# Patient Record
Sex: FEMALE | Race: WHITE | NOT HISPANIC OR LATINO | Employment: OTHER | URBAN - METROPOLITAN AREA
[De-identification: names, ages, dates, MRNs, and addresses within clinical notes are randomized per-mention and may not be internally consistent; named-entity substitution may affect disease eponyms.]

---

## 2017-02-18 ASSESSMENT — ENCOUNTER SYMPTOMS
COUGH: 1
MYALGIAS: 0
HEMATURIA: 0
SMELL DISTURBANCE: 1
SPUTUM PRODUCTION: 1
DYSPNEA ON EXERTION: 0
RESPIRATORY PAIN: 0
MUSCLE CRAMPS: 0
SORE THROAT: 1
FLANK PAIN: 0
BACK PAIN: 1
HEMOPTYSIS: 0
NECK PAIN: 0
WHEEZING: 0
SNORES LOUDLY: 0
TROUBLE SWALLOWING: 0
DYSURIA: 0
STIFFNESS: 1
TASTE DISTURBANCE: 1
NECK MASS: 0
HOARSE VOICE: 1
COUGH DISTURBING SLEEP: 1
DIFFICULTY URINATING: 1
ARTHRALGIAS: 0
JOINT SWELLING: 0
SINUS CONGESTION: 1
SINUS PAIN: 1
HOT FLASHES: 0
DECREASED LIBIDO: 1
MUSCLE WEAKNESS: 0
SHORTNESS OF BREATH: 0
POSTURAL DYSPNEA: 0

## 2017-02-21 ENCOUNTER — ONCOLOGY VISIT (OUTPATIENT)
Dept: ONCOLOGY | Facility: CLINIC | Age: 62
End: 2017-02-21
Attending: NURSE PRACTITIONER
Payer: COMMERCIAL

## 2017-02-21 VITALS
OXYGEN SATURATION: 99 % | HEIGHT: 66 IN | SYSTOLIC BLOOD PRESSURE: 128 MMHG | WEIGHT: 136 LBS | RESPIRATION RATE: 17 BRPM | BODY MASS INDEX: 21.86 KG/M2 | HEART RATE: 56 BPM | TEMPERATURE: 98.3 F | DIASTOLIC BLOOD PRESSURE: 80 MMHG

## 2017-02-21 DIAGNOSIS — C54.1 ENDOMETRIAL CANCER (H): Primary | ICD-10-CM

## 2017-02-21 PROCEDURE — 99213 OFFICE O/P EST LOW 20 MIN: CPT | Mod: ZP | Performed by: NURSE PRACTITIONER

## 2017-02-21 PROCEDURE — 99212 OFFICE O/P EST SF 10 MIN: CPT | Mod: ZF

## 2017-02-21 ASSESSMENT — PAIN SCALES - GENERAL: PAINLEVEL: NO PAIN (0)

## 2017-02-21 NOTE — PROGRESS NOTES
Follow Up Notes on Referred Patient    Date: 2017    RE: Leydi Zaragoza  : 1955  BARB: 2017    Leydi Zaragoza is a 61 year old woman with a diagnosis of stage 1A grade 1 endometrioid adenocarcinoma s/p TLH, BSO on 13. She is here today for a surveillance visit. Denies vulvar or perineal irritation. She has chronic difficulty sleeping, urinary inc, vaginal dryness, and generalized arthralgias and myalgias. Denies abd pain or constipation. She has been feeling great and plays tennis frequently. She is up to date on her colonoscopy. She needs to schedule her mammo.    Cancer History:    The patient presented for evaluation of an episode of postmenopausal bleeding 13. Also c/o RUQ pain. No cramping. Pelvic ultrasound done on 13 normal uterus and ovaries, endometrial cavity containing three masses measuring 2.1 x 2.1 x 1.2 cm, 1.2 x 1.3 x 0.8 cm, 0.8 x 1.0 x 0.7 cm.    13: Dilatation and curettage, operative hysteroscopy with MyoSure morcellation of endometrial polyps with Jaylene Jacobson MD    FINAL DIAGNOSIS:  Uterus, endometrium, polyp:  - Complex atypical hyperplasia.    Uterus, endometrium, polypectomy:  - Adenocarcinoma arising in complex atypical hyperplasia. Fragmented, but markedly atypical endometrium. There is marked cytologic atypia. In some areas, branching glandular forms are closely opposed. There is mild squamoid morular metaplasia. Some features are worrisome for vicente carcinoma; however, the biopsy is quite fragmented. Separately submitted fragments labeled morcellated polyp shows similar features with more confluent areas, thus meeting criteria for adenocarcinoma .   13: Exam under anesthesia, total laparoscopic hysterectomy, bilateral salpingo-oophorectomy, pelvic washings, cystoscopy    Pathologic Staging: pT1a pNX.  Primary Tumor (pT): pT1a [1A]: Tumor invades less than one-half of the myometrium.  Regional lymph nodes (pN): pNX: Cannot be  assessed  No nodes submitted or found  Distant Metastasis (pM): Not applicable.  7/17/13: Post op visit. Pathology reviewed in detail with the patient and copy provided. In light of the grade 1 and limited depth of invasion < 30%, no addition adjuvant therapy is recommended at this time. Potential risk of recurrence reviewed and sign for possible recurrence reviewed. Plan for scheduled tumor surveillance every 3 months for the first 2 years and them every 6 months for an addition 3 years. Patient is encouraged to continue follow up for routine cancer surveillance including breast and colon cancer.  10/24/13: Pap NIL.     10/30/14: Surveillance visit. Feeling well. She denies any vaginal bleeding, no new changes in her bowel (has seen her PCP for diarrhea and is being worked up) or bladder habits (has scant amount of urine loss in the morning), no nausea/emesis, no lower extremity edema, and no difficulties eating or sleeping. She denies any abdominal discomfort/bloating, no fevers or chills, and no chest pain or shortness of breath. She states her health screenings are current, she is sexually active and using a lubricant, and reports occassional dryness for which she uses coconut oil with good results.      02/11/15: Survelliance visit. She reports feeling well. Denies any vaginal bleeding, abd pain/discomfort, or abd bloating/cramping. No new changes in her bowel (has seen her PCP for diarrhea and is being worked up), she takes benafiber and has had regular bm. No change in bladder habits, no nausea/emesis, no lower extremity edema, and no difficulties eating or sleeping. No fevers or chills, and no chest pain or shortness of breath. She states her health screenings are current except she is due for mammogram, she is sexually active and using a lubricant, and reports occassional dryness for which she uses coconut oil with good results.      4/21/15: Patient called the clinic and spoke with clinic staff:  "Assessment: Pt of Dr. Khan with HX of grade 1 stage IA endometrioid adenocarcinoma s/p TLH, BSO on 7/1/13 . Is scheduled to have her 3 month check on 5/12. However would like a sooner appointment. Pt has discovered a lump between her vagina and rectum which is irritated and when touched has a pin prick sensation. Difficult for pt to assess. Two friends who were diagnosed with cancer at the same time she was have within the last month been diagnosed with metastatic cancer, \"they are goners\" Pt is very concerned. Plan: Suggested pt see her PCP now but does not want to do that. Sooner appointment given on 4/28 at 10:20 AM     4/27/15: Follow up of lump between her vagina and rectum which is irritated when touched that has a pin prick sensation. She notes RLQ discomfort stabbing sensation off and on. 3 month follow up.  7/28/15: 3 month follow up  02/9/16: 3 month follow up   8/9/16: 3 month follow up   2/21/17: 3 month follow up      Review of Systems:     Systemic no weight changes; no fever; no chills; occasional night sweats; no appetite changes no difficulty sleeping  Skin no rashes, or lesions. Right vulvar lesion followed by derm and benign    Eye no irritation; no changes in vision  Magali-Laryngeal no dysphagia; no hoarseness    Pulmonary no cough; no shortness of breath  Cardiovascular no chest pain; no palpitations  Gastrointestinal no diarrhea; no constipation; no abdominal pain; no changes in bowel habits; no blood in stool.  Genitourinary no urinary frequency; no urinary urgency; no dysuria; no pain; no abnormal vaginal discharge; no abnormal vaginal bleeding; + vaginal dryness + urinary inc wears pad + chronic inc that remains the same    Musculoskeletal + myalgias; + arthralgias; + right low back pain  Psychiatric no depressed mood; no anxiety    Hematologic no tender lymph nodes; no noticeable swellings or lumps    Endocrine + hot flashes controlled; no heat/cold intolerance    Neurological no tremor; " occasional numbness and tingling to ue and le; no headaches; no difficulty sleeping    Past Medical History:    Past Medical History   Diagnosis Date     Acute bronchospasm      one year but resolved - exercise induced.     Uterine polyp          Past Surgical History:    Past Surgical History   Procedure Laterality Date     Hc tooth extraction w/forcep       Colonoscopy  10/11/11     Dilation and curettage, operative hysteroscopy with morcellator, combined  6/20/2013     Procedure: COMBINED DILATION AND CURETTAGE, OPERATIVE HYSTEROSCOPY WITH MORCELLATOR;  Dilation And Curettage, Operative Hysteroscopy With Myosure, Morcellation of endometrial polyps.;  Surgeon: Jaylene Jacobson MD;  Location: [facility]     Laparoscopic hysterectomy total, bilateral salpingo-oophorectomy, node dissection, combined  7/1/2013     Procedure: COMBINED LAPAROSCOPIC HYSTERECTOMY TOTAL, SALPINGO-OOPHORECTOMY, NODE DISSECTION;  Total Laparoscopic Hysterectomy, Bilateral Salpingo Oophorectomy, Cystoscopy;  Surgeon: Jaelyn Khan MD;  Location: [facility] OR         Health Maintenance Due   Topic Date Due     HEPATITIS C SCREENING  07/01/1971     INFLUENZA VACCINE (SYSTEM ASSIGNED)  09/01/2016       Current Medications:     Current Outpatient Prescriptions   Medication Sig Dispense Refill     MAGNESIUM CARBONATE PO Take 125 mg by mouth daily        B Complex Vitamins (VITAMIN-B COMPLEX PO) Take by mouth daily        glutamine 500 MG TABS Take by mouth daily        Calcium Carbonate-Vitamin D (CALCIUM 500 + D PO) Take by mouth 2 times daily       Loperamide HCl (IMODIUM A-D PO) Take by mouth as needed Reported on 2/21/2017           Allergies:        Allergies   Allergen Reactions     Amoxicillin      rash        Social History:     Social History   Substance Use Topics     Smoking status: Never Smoker     Smokeless tobacco: Never Used     Alcohol use 0.0 oz/week     0 Standard drinks or equivalent per week      Comment: Rare, 1 per week  "        Family History:     The patient's family history is notable for     Family History   Problem Relation Age of Onset     C.A.D. Mother      stents at age 78, passed away 1/30/10     DIABETES Mother      Hypertension Mother      HEART DISEASE Mother      Uses heart medication for Tachycardia, has had stent placement 2 at once     Arthritis Mother      Hypertension Father      Neurologic Disorder Daughter      astrocytoma     Connective Tissue Disorder Daughter      jrsatish, in remission, melonoma     GASTROINTESTINAL DISEASE Mother      bowel obstruction - passed away age 86     CANCER Daughter      melanoma         Physical Exam:     /80 (BP Location: Right arm, Patient Position: Chair, Cuff Size: Adult Regular)  Pulse 56  Temp 98.3  F (36.8  C) (Oral)  Resp 17  Ht 1.676 m (5' 6\")  Wt 61.7 kg (136 lb)  LMP 09/15/2007  SpO2 99%  BMI 21.95 kg/m2  Body mass index is 21.95 kg/(m^2).    General Appearance: healthy and alert, no distress     HEENT:  no thyromegaly, no palpable nodules or masses        Cardiovascular: regular rate and rhythm, no gallops, rubs or murmurs     Respiratory: lungs clear, no rales, rhonchi or wheezes    Musculoskeletal: extremities non tender and without edema    Skin: no lesions or rashes     Neurological: normal gait, no gross defects     Psychiatric: appropriate mood and affect                               Hematological: normal cervical, supraclavicular and inguinal lymph nodes     Gastrointestinal:       abdomen soft, non-tender, non-distended, no organomegaly or masses    Genitourinary: External genitalia and urethral meatus appears normal. Patient has Right vulvar nevi which remains the same and she follows with dermatology. No abnormal lesions noted. Vagina is smooth without nodularity or masses. Cervix surgically absent. Bimanual exam reveal no masses, nodularity or fullness. Recto-vaginal exam confirms these findings.     Assessment:     Leydi Zaragoza is a 61 year old woman " with a diagnosis of stage IA grade 1 endometrioid adenocarcinoma s/p TLH, BSO on 7/1/13. She is here today for a surveillance visit.     Plan:      1.) Stage 1A Grade 1 Endometrioid Adenocarcinoma. Patient to RTC in 6 months for her next surveillance visit. Reviewed her remaining surveillance every 6 months for an additional 3 years. Reviewed signs and symptoms for when she should contact the clinic or seek additional care. Patient to contact the clinic with any questions or concerns in the interim.  2.) Vaginal Dryness. Reviewed use of lubricant for daily use as needed; adequate amount of lubricant for intercourse, as well as changing positions for comfort. Try OTC replense.  3.) Labs and/or tests ordered include: None today.  4.) Health maintenance issues addressed today include annual health maintenance and non-gyn issues with PCP.  Sera Tavera CNP  2/21/2017 10:35 AM    CC  Patient Care Team:  Mary Leonardo DO as PCP - General  Mary Rousseau PsyD as Psychologist (Psychology)  SELF, REFERRED

## 2017-02-21 NOTE — LETTER
2017       RE: Leydi Zaragoza  [address]  [place] [id number]     Dear Colleague,    Thank you for referring your patient, Leydi Zaragoza, to the [facility]. Please see a copy of my visit note below.                Follow Up Notes on Referred Patient    Date: 2017    RE: Leydi Zaragoza  : 1955  BARB: 2017    Leydi Zaragoza is a 61 year old woman with a diagnosis of stage 1A grade 1 endometrioid adenocarcinoma s/p TLH, BSO on 13. She is here today for a surveillance visit. Denies vulvar or perineal irritation. She has chronic difficulty sleeping, urinary inc, vaginal dryness, and generalized arthralgias and myalgias. Denies abd pain or constipation. She has been feeling great and plays tennis frequently. She is up to date on her colonoscopy. She needs to schedule her mammo.    Cancer History:    The patient presented for evaluation of an episode of postmenopausal bleeding 13. Also c/o RUQ pain. No cramping. Pelvic ultrasound done on 13 normal uterus and ovaries, endometrial cavity containing three masses measuring 2.1 x 2.1 x 1.2 cm, 1.2 x 1.3 x 0.8 cm, 0.8 x 1.0 x 0.7 cm.    13: Dilatation and curettage, operative hysteroscopy with MyoSure morcellation of endometrial polyps with Jaylene Jacobson MD    FINAL DIAGNOSIS:  Uterus, endometrium, polyp:  - Complex atypical hyperplasia.    Uterus, endometrium, polypectomy:  - Adenocarcinoma arising in complex atypical hyperplasia. Fragmented, but markedly atypical endometrium. There is marked cytologic atypia. In some areas, branching glandular forms are closely opposed. There is mild squamoid morular metaplasia. Some features are worrisome for vicente carcinoma; however, the biopsy is quite fragmented. Separately submitted fragments labeled morcellated polyp shows similar features with more confluent areas, thus meeting criteria for adenocarcinoma .   13: Exam under anesthesia, total  laparoscopic hysterectomy, bilateral salpingo-oophorectomy, pelvic washings, cystoscopy    Pathologic Staging: pT1a pNX.  Primary Tumor (pT): pT1a [1A]: Tumor invades less than one-half of the myometrium.  Regional lymph nodes (pN): pNX: Cannot be assessed  No nodes submitted or found  Distant Metastasis (pM): Not applicable.  7/17/13: Post op visit. Pathology reviewed in detail with the patient and copy provided. In light of the grade 1 and limited depth of invasion < 30%, no addition adjuvant therapy is recommended at this time. Potential risk of recurrence reviewed and sign for possible recurrence reviewed. Plan for scheduled tumor surveillance every 3 months for the first 2 years and them every 6 months for an addition 3 years. Patient is encouraged to continue follow up for routine cancer surveillance including breast and colon cancer.  10/24/13: Pap NIL.     10/30/14: Surveillance visit. Feeling well. She denies any vaginal bleeding, no new changes in her bowel (has seen her PCP for diarrhea and is being worked up) or bladder habits (has scant amount of urine loss in the morning), no nausea/emesis, no lower extremity edema, and no difficulties eating or sleeping. She denies any abdominal discomfort/bloating, no fevers or chills, and no chest pain or shortness of breath. She states her health screenings are current, she is sexually active and using a lubricant, and reports occassional dryness for which she uses coconut oil with good results.      02/11/15: Survelliance visit. She reports feeling well. Denies any vaginal bleeding, abd pain/discomfort, or abd bloating/cramping. No new changes in her bowel (has seen her PCP for diarrhea and is being worked up), she takes benafiber and has had regular bm. No change in bladder habits, no nausea/emesis, no lower extremity edema, and no difficulties eating or sleeping. No fevers or chills, and no chest pain or shortness of breath. She states her health screenings are  "current except she is due for mammogram, she is sexually active and using a lubricant, and reports occassional dryness for which she uses coconut oil with good results.      4/21/15: Patient called the clinic and spoke with clinic staff: Assessment: Pt of Dr. Khan with HX of grade 1 stage IA endometrioid adenocarcinoma s/p TLH, BSO on 7/1/13 . Is scheduled to have her 3 month check on 5/12. However would like a sooner appointment. Pt has discovered a lump between her vagina and rectum which is irritated and when touched has a pin prick sensation. Difficult for pt to assess. Two friends who were diagnosed with cancer at the same time she was have within the last month been diagnosed with metastatic cancer, \"they are goners\" Pt is very concerned. Plan: Suggested pt see her PCP now but does not want to do that. Sooner appointment given on 4/28 at 10:20 AM     4/27/15: Follow up of lump between her vagina and rectum which is irritated when touched that has a pin prick sensation. She notes RLQ discomfort stabbing sensation off and on. 3 month follow up.  7/28/15: 3 month follow up  02/9/16: 3 month follow up   8/9/16: 3 month follow up   2/21/17: 3 month follow up      Review of Systems:     Systemic no weight changes; no fever; no chills; occasional night sweats; no appetite changes no difficulty sleeping  Skin no rashes, or lesions. Right vulvar lesion followed by derm and benign    Eye no irritation; no changes in vision  Magali-Laryngeal no dysphagia; no hoarseness    Pulmonary no cough; no shortness of breath  Cardiovascular no chest pain; no palpitations  Gastrointestinal no diarrhea; no constipation; no abdominal pain; no changes in bowel habits; no blood in stool.  Genitourinary no urinary frequency; no urinary urgency; no dysuria; no pain; no abnormal vaginal discharge; no abnormal vaginal bleeding; + vaginal dryness + urinary inc wears pad + chronic inc that remains the same    Musculoskeletal + myalgias; + " arthralgias; + right low back pain  Psychiatric no depressed mood; no anxiety    Hematologic no tender lymph nodes; no noticeable swellings or lumps    Endocrine + hot flashes controlled; no heat/cold intolerance    Neurological no tremor; occasional numbness and tingling to ue and le; no headaches; no difficulty sleeping    Past Medical History:    Past Medical History   Diagnosis Date     Acute bronchospasm      one year but resolved - exercise induced.     Uterine polyp          Past Surgical History:    Past Surgical History   Procedure Laterality Date     Hc tooth extraction w/forcep       Colonoscopy  10/11/11     Dilation and curettage, operative hysteroscopy with morcellator, combined  6/20/2013     Procedure: COMBINED DILATION AND CURETTAGE, OPERATIVE HYSTEROSCOPY WITH MORCELLATOR;  Dilation And Curettage, Operative Hysteroscopy With Myosure, Morcellation of endometrial polyps.;  Surgeon: Jaylene Jacobson MD;  Location: [facility]     Laparoscopic hysterectomy total, bilateral salpingo-oophorectomy, node dissection, combined  7/1/2013     Procedure: COMBINED LAPAROSCOPIC HYSTERECTOMY TOTAL, SALPINGO-OOPHORECTOMY, NODE DISSECTION;  Total Laparoscopic Hysterectomy, Bilateral Salpingo Oophorectomy, Cystoscopy;  Surgeon: Jaelyn Khan MD;  Location: [facility] OR         Health Maintenance Due   Topic Date Due     HEPATITIS C SCREENING  07/01/1971     INFLUENZA VACCINE (SYSTEM ASSIGNED)  09/01/2016       Current Medications:     Current Outpatient Prescriptions   Medication Sig Dispense Refill     MAGNESIUM CARBONATE PO Take 125 mg by mouth daily        B Complex Vitamins (VITAMIN-B COMPLEX PO) Take by mouth daily        glutamine 500 MG TABS Take by mouth daily        Calcium Carbonate-Vitamin D (CALCIUM 500 + D PO) Take by mouth 2 times daily       Loperamide HCl (IMODIUM A-D PO) Take by mouth as needed Reported on 2/21/2017           Allergies:        Allergies   Allergen Reactions     Amoxicillin      rash     "    Social History:     Social History   Substance Use Topics     Smoking status: Never Smoker     Smokeless tobacco: Never Used     Alcohol use 0.0 oz/week     0 Standard drinks or equivalent per week      Comment: Rare, 1 per week         Family History:     The patient's family history is notable for     Family History   Problem Relation Age of Onset     C.A.D. Mother      stents at age 78, passed away 1/30/10     DIABETES Mother      Hypertension Mother      HEART DISEASE Mother      Uses heart medication for Tachycardia, has had stent placement 2 at once     Arthritis Mother      Hypertension Father      Neurologic Disorder Daughter      astrocytoma     Connective Tissue Disorder Daughter      jra, in remission, melonoma     GASTROINTESTINAL DISEASE Mother      bowel obstruction - passed away age 86     CANCER Daughter      melanoma         Physical Exam:     /80 (BP Location: Right arm, Patient Position: Chair, Cuff Size: Adult Regular)  Pulse 56  Temp 98.3  F (36.8  C) (Oral)  Resp 17  Ht 1.676 m (5' 6\")  Wt 61.7 kg (136 lb)  LMP 09/15/2007  SpO2 99%  BMI 21.95 kg/m2  Body mass index is 21.95 kg/(m^2).    General Appearance: healthy and alert, no distress     HEENT:  no thyromegaly, no palpable nodules or masses        Cardiovascular: regular rate and rhythm, no gallops, rubs or murmurs     Respiratory: lungs clear, no rales, rhonchi or wheezes    Musculoskeletal: extremities non tender and without edema    Skin: no lesions or rashes     Neurological: normal gait, no gross defects     Psychiatric: appropriate mood and affect                               Hematological: normal cervical, supraclavicular and inguinal lymph nodes     Gastrointestinal:       abdomen soft, non-tender, non-distended, no organomegaly or masses    Genitourinary: External genitalia and urethral meatus appears normal. Patient has Right vulvar nevi which remains the same and she follows with dermatology. No abnormal lesions noted. " Vagina is smooth without nodularity or masses. Cervix surgically absent. Bimanual exam reveal no masses, nodularity or fullness. Recto-vaginal exam confirms these findings.     Assessment:     Leydi Zaragoza is a 61 year old woman with a diagnosis of stage IA grade 1 endometrioid adenocarcinoma s/p TLH, BSO on 7/1/13. She is here today for a surveillance visit.     Plan:      1.) Stage 1A Grade 1 Endometrioid Adenocarcinoma. Patient to RTC in 6 months for her next surveillance visit. Reviewed her remaining surveillance every 6 months for an additional 3 years. Reviewed signs and symptoms for when she should contact the clinic or seek additional care. Patient to contact the clinic with any questions or concerns in the interim.  2.) Vaginal Dryness. Reviewed use of lubricant for daily use as needed; adequate amount of lubricant for intercourse, as well as changing positions for comfort. Try OTC replense.  3.) Labs and/or tests ordered include: None today.  4.) Health maintenance issues addressed today include annual health maintenance and non-gyn issues with PCP.  Sera Tavera CNP  2/21/2017 10:35 AM    CC  Patient Care Team:  Mary Leonardo DO as PCP - General  Mary Rousseau PsyD as Psychologist (Psychology)

## 2017-02-21 NOTE — NURSING NOTE
"Leydi Zaragoza is a 61 year old female who presents for:  Chief Complaint   Patient presents with     Oncology Clinic Visit     return patient for 6 months visit related to Endometrial/uterine adenocarcinoma (H)        Initial Vitals:  /80 (BP Location: Right arm, Patient Position: Chair, Cuff Size: Adult Regular)  Pulse 56  Temp 98.3  F (36.8  C) (Oral)  Resp 17  Ht 1.676 m (5' 6\")  Wt 61.7 kg (136 lb)  LMP 09/15/2007  SpO2 99%  BMI 21.95 kg/m2 Estimated body mass index is 21.95 kg/(m^2) as calculated from the following:    Height as of this encounter: 1.676 m (5' 6\").    Weight as of this encounter: 61.7 kg (136 lb).. Body surface area is 1.7 meters squared. BP completed using cuff size: regular  No Pain (0) Patient's last menstrual period was 09/15/2007. Allergies and medications reviewed.     Medications: Medication refills not needed today.  Pharmacy name entered into [facility]:    [id number] [place] [facility] - [place] - [address]  [id number] - [place] - [address]    Comments: patient denied pelvic/vaginal pain/discomfort.     5 minutes for nursing intake (face to face time)   Herson Pope CMA        "

## 2017-02-21 NOTE — MR AVS SNAPSHOT
"              After Visit Summary   2/21/2017    Leydi Zaragoza    MRN: [id number]           Patient Information     Date Of Birth          1955        Visit Information        Provider Department      2/21/2017 10:00 AM Sera Tavera APRN CNP [facility]        Today's Diagnoses     Endometrial cancer (H)    -  1       Follow-ups after your visit        Follow-up notes from your care team     Return in about 6 months (around 8/21/2017).      Who to contact     If you have questions or need follow up information about today's clinic visit or your schedule please contact [facility] directly at [phone].  Normal or non-critical lab and imaging results will be communicated to you by [email or web address], letter or phone within 4 business days after the clinic has received the results. If you do not hear from us within 7 days, please contact the clinic through [email or web address] or phone. If you have a critical or abnormal lab result, we will notify you by phone as soon as possible.  Submit refill requests through MIKESTAR or call your pharmacy and they will forward the refill request to us. Please allow 3 business days for your refill to be completed.          Additional Information About Your Visit        MyChart Information     MIKESTAR gives you secure access to your electronic health record. If you see a primary care provider, you can also send messages to your care team and make appointments. If you have questions, please call your primary care clinic.  If you do not have a primary care provider, please call [phone] and they will assist you.        Care EveryWhere ID     This is your Care EveryWhere ID. This could be used by other organizations to access your [place] medical records  [id number]        Your Vitals Were     Pulse Temperature Respirations Height Last Period Pulse Oximetry    56 98.3  F (36.8  C) (Oral) 17 1.676 m (5' 6\") 09/15/2007 99%    BMI (Body Mass Index)    "                21.95 kg/m2            Blood Pressure from Last 3 Encounters:   02/21/17 128/80   12/16/16 128/70   09/22/16 125/73    Weight from Last 3 Encounters:   02/21/17 61.7 kg (136 lb)   12/16/16 59.9 kg (132 lb 1.6 oz)   09/22/16 60.4 kg (133 lb 1.6 oz)              Today, you had the following     No orders found for display       Primary Care Provider Office Phone # Fax #    Mary MARC MalissaDO [phone]       [facility] [address] [facility]  [address]  [facility] [address]        Thank you!     Thank you for choosing [facility]  for your care. Our goal is always to provide you with excellent care. Hearing back from our patients is one way we can continue to improve our services. Please take a few minutes to complete the written survey that you may receive in the mail after your visit with us. Thank you!             Your Updated Medication List - Protect others around you: Learn how to safely use, store and throw away your medicines at www.disposemymeds.org.          This list is accurate as of: 2/21/17 10:36 AM.  Always use your most recent med list.                   Brand Name Dispense Instructions for use    CALCIUM 500 + D PO      Take by mouth 2 times daily       glutamine 500 MG Tabs      Take by mouth daily       IMODIUM A-D PO      Take by mouth as needed Reported on 2/21/2017       MAGNESIUM CARBONATE PO      Take 125 mg by mouth daily       VITAMIN-B COMPLEX PO      Take by mouth daily

## 2017-04-12 ENCOUNTER — OFFICE VISIT (OUTPATIENT)
Dept: FAMILY MEDICINE | Facility: CLINIC | Age: 62
End: 2017-04-12
Payer: COMMERCIAL

## 2017-04-12 VITALS
HEART RATE: 88 BPM | BODY MASS INDEX: 21.89 KG/M2 | TEMPERATURE: 98.5 F | SYSTOLIC BLOOD PRESSURE: 122 MMHG | WEIGHT: 136.2 LBS | HEIGHT: 66 IN | OXYGEN SATURATION: 96 % | DIASTOLIC BLOOD PRESSURE: 74 MMHG

## 2017-04-12 DIAGNOSIS — Z00.00 ENCOUNTER FOR ROUTINE ADULT HEALTH EXAMINATION WITHOUT ABNORMAL FINDINGS: Primary | ICD-10-CM

## 2017-04-12 DIAGNOSIS — C54.1 ENDOMETRIAL/UTERINE ADENOCARCINOMA (H): ICD-10-CM

## 2017-04-12 DIAGNOSIS — D23.9 DYSPLASTIC NEVUS: ICD-10-CM

## 2017-04-12 PROCEDURE — 80061 LIPID PANEL: CPT | Performed by: FAMILY MEDICINE

## 2017-04-12 PROCEDURE — 86803 HEPATITIS C AB TEST: CPT | Performed by: FAMILY MEDICINE

## 2017-04-12 PROCEDURE — 99396 PREV VISIT EST AGE 40-64: CPT | Performed by: FAMILY MEDICINE

## 2017-04-12 PROCEDURE — 82607 VITAMIN B-12: CPT | Performed by: FAMILY MEDICINE

## 2017-04-12 PROCEDURE — 36415 COLL VENOUS BLD VENIPUNCTURE: CPT | Performed by: FAMILY MEDICINE

## 2017-04-12 NOTE — PROGRESS NOTES
"   SUBJECTIVE:     CC: Leydi Zaragoza is an 61 year old woman who presents for preventive health visit.     Healthy Habits:    Do you get at least three servings of calcium containing foods daily (dairy, green leafy vegetables, etc.)? {YES/NO, DAIRY INTAKE:790922::\"yes\"}    Amount of exercise or daily activities, outside of work: {AMOUNT EXERCISE:049675}    Problems taking medications regularly {Yes /No default:678117::\"No\"}    Medication side effects: {Yes /No default.:232516::\"No\"}    Have you had an eye exam in the past two years? {YESNOBLANK:084631}    Do you see a dentist twice per year? {YESNOBLANK:692303}    Do you have sleep apnea, excessive snoring or daytime drowsiness?{YESNOBLANK:327109}    {Outside tests to abstract? :785232}    {additional problems to add:829134}    Today's PHQ-2 Score:   PHQ-2 ( 1999 Pfizer) 4/12/2017 8/9/2016   Q1: Little interest or pleasure in doing things - 0   Q2: Feeling down, depressed or hopeless - 0   PHQ-2 Score - 0   Little interest or pleasure in doing things Not at all -   Feeling down, depressed or hopeless Not at all -   PHQ-2 Score 0 -     {PHQ-2 LOOK IN ASSESSMENTS :299327}  Abuse: Current or Past(Physical, Sexual or Emotional)- {YES/NO/NA:124811}  Do you feel safe in your environment - {YES/NO/NA:852965}    Social History   Substance Use Topics     Smoking status: Never Smoker     Smokeless tobacco: Never Used     Alcohol use 0.0 oz/week     0 Standard drinks or equivalent per week      Comment: Rare, 1 per week     {ETOH AUDIT:756081}    Recent Labs   Lab Test  10/29/14   1108  09/12/12   0824   CHOL  191  180   HDL  90  66   LDL  92  100   TRIG  43  71   CHOLHDLRATIO  2.1  2.8       Reviewed orders with patient.  Reviewed health maintenance and updated orders accordingly - {Yes/No:540300::\"Yes\"}    Mammo Decision Support:  {Mammo:832058}    Pertinent mammograms are reviewed under the imaging tab.  History of abnormal Pap smear: {PAP HX:126071}    Reviewed and " "updated as needed this visit by clinical staff  Tobacco  Allergies  Meds  Med Hx  Surg Hx  Fam Hx  Soc Hx        Reviewed and updated as needed this visit by Provider        {HISTORY OPTIONS:994468}    ROS:  {FEMALE PREVENTATIVE ROS:904101}    {CHRONICPROBDATA:305260}  OBJECTIVE:     LMP 09/15/2007  EXAM:  {Exam Choices:117585}    ASSESSMENT/PLAN:     {Diag Picklist:567608}    COUNSELING:   {FEMALE COUNSELING MESSAGES:189858::\"Reviewed preventive health counseling, as reflected in patient instructions\"}    {Blood Pressure Screenin}     reports that she has never smoked. She has never used smokeless tobacco.  {Tobacco Cessation needed for ACO -- Delete if patient is a non-smoker:861776}  Estimated body mass index is 21.95 kg/(m^2) as calculated from the following:    Height as of 17: 5' 6\" (1.676 m).    Weight as of 17: 136 lb (61.7 kg).   {Weight Management Plan needed for ACO:512840}    Counseling Resources:  ATP IV Guidelines  Pooled Cohorts Equation Calculator  Breast Cancer Risk Calculator  FRAX Risk Assessment  ICSI Preventive Guidelines  Dietary Guidelines for Americans, 2010  USDA's MyPlate  ASA Prophylaxis  Lung CA Screening    Mary Leonardo, DO  [facility]  "

## 2017-04-12 NOTE — PROGRESS NOTES
SUBJECTIVE:     CC: Leydi Zaragoza is an 61 year old woman who presents for preventive health visit.     Physical   Annual:     Getting at least 3 servings of Calcium per day::  Yes    Bi-annual eye exam::  Yes    Dental care twice a year::  NO    Sleep apnea or symptoms of sleep apnea::  None    Frequency of exercise::  2-3 days/week    Duration of exercise::  Greater than 60 minutes    Taking medications regularly::  Not Applicable    Additional concerns today::  No        -------------------------------------    Today's PHQ-2 Score:   PHQ-2 ( 1999 Pfizer) 4/12/2017   Little interest or pleasure in doing things Not at all   Feeling down, depressed or hopeless Not at all   PHQ-2 Score 0       Abuse: Current or Past(Physical, Sexual or Emotional)- NO  Do you feel safe in your environment - Yes    Social History   Substance Use Topics     Smoking status: Never Smoker     Smokeless tobacco: Never Used     Alcohol use 0.0 oz/week     0 Standard drinks or equivalent per week      Comment: Rare, 1 per week     The patient does not drink >3 drinks per day nor >7 drinks per week.    Recent Labs   Lab Test  10/29/14   1108  09/12/12   0824   CHOL  191  180   HDL  90  66   LDL  92  100   TRIG  43  71   CHOLHDLRATIO  2.1  2.8       Reviewed orders with patient.  Reviewed health maintenance and updated orders accordingly - Yes    Mammo Decision Support:  Patient over age 50, mutual decision to screen reflected in health maintenance.    Pertinent mammograms are reviewed under the imaging tab.  History of abnormal Pap smear: Status post benign hysterectomy. Health Maintenance and Surgical History updated.    Reviewed and updated as needed this visit by clinical staff  Tobacco  Allergies  Meds  Med Hx  Surg Hx  Fam Hx  Soc Hx        Reviewed and updated as needed this visit by Provider            ROS:  C: NEGATIVE for fever, chills, change in weight  I: NEGATIVE for worrisome rashes, moles or lesions  E: NEGATIVE for  vision changes or irritation  ENT: NEGATIVE for ear, mouth and throat problems  R: NEGATIVE for significant cough or SOB  B: NEGATIVE for masses, tenderness or discharge  CV: NEGATIVE for chest pain, palpitations or peripheral edema  GI: NEGATIVE for nausea, abdominal pain, heartburn, or change in bowel habits  : NEGATIVE for unusual urinary or vaginal symptoms. No vaginal bleeding.  M: NEGATIVE for significant arthralgias or myalgia  N: NEGATIVE for weakness, dizziness or paresthesias  P: NEGATIVE for changes in mood or affect     Problem list, Medication list, Allergies, and Medical/Social/Surgical histories reviewed in [facility] and updated as appropriate.  OBJECTIVE:     [facility] 09/15/2007  EXAM:  GENERAL APPEARANCE: healthy, alert and no distress  EYES: Eyes grossly normal to inspection, PERRL and conjunctivae and sclerae normal  HENT: ear canals and TM's normal, nose and mouth without ulcers or lesions, oropharynx clear and oral mucous membranes moist  NECK: no adenopathy, no asymmetry, masses, or scars and thyroid normal to palpation  RESP: lungs clear to auscultation - no rales, rhonchi or wheezes  BREAST: normal without masses, tenderness or nipple discharge and no palpable axillary masses or adenopathy  CV: regular rate and rhythm, normal S1 S2, no S3 or S4, no murmur, click or rub, no peripheral edema and peripheral pulses strong  ABDOMEN: soft, nontender, no hepatosplenomegaly, no masses and bowel sounds normal  MS: no musculoskeletal defects are noted and gait is age appropriate without ataxia  SKIN: no suspicious lesions or rashes  NEURO: Normal strength and tone, sensory exam grossly normal, mentation intact and speech normal  PSYCH: mentation appears normal and affect normal/bright    ASSESSMENT/PLAN:     1. Encounter for routine adult health examination without abnormal findings     - Lipid Profile with reflex to direct LDL  - Hepatitis C Screen Reflex to HCV RNA Quant and Genotype    2.  "Endometrial/uterine adenocarcinoma (H)     - Vitamin B12    3. Dysplastic nevus         COUNSELING:  Reviewed preventive health counseling, as reflected in patient instructions         reports that she has never smoked. She has never used smokeless tobacco.    Estimated body mass index is 21.95 kg/(m^2) as calculated from the following:    Height as of 2/21/17: 5' 6\" (1.676 m).    Weight as of 2/21/17: 136 lb (61.7 kg).       Counseling Resources:  ATP IV Guidelines  Pooled Cohorts Equation Calculator  Breast Cancer Risk Calculator  FRAX Risk Assessment  ICSI Preventive Guidelines  Dietary Guidelines for Americans, 2010  [employer]'s MyPlate  ASA Prophylaxis  Lung CA Screening    Mary Leonardo,   [facility]  Answers for HPI/ROS submitted by the patient on 4/12/2017   Q1: Little interest or pleasure in doing things: 0=Not at all  Q2: Feeling down, depressed or hopeless: 0=Not at all  PHQ-2 Score: 0    "

## 2017-04-12 NOTE — MR AVS SNAPSHOT
After Visit Summary   4/12/2017    Leydi Zaragoza    MRN: [id number]           Patient Information     Date Of Birth          1955        Visit Information        Provider Department      4/12/2017 2:30 PM Mary Leonardo DO [facility]        Today's Diagnoses     Encounter for routine adult health examination without abnormal findings    -  1    Endometrial/uterine adenocarcinoma (H)        Dysplastic nevus          Care Instructions      Preventive Health Recommendations  Female Ages 50 - 64    Yearly exam: See your health care provider every year in order to  o Review health changes.   o Discuss preventive care.    o Review your medicines if your doctor has prescribed any.      Get a Pap test every three years (unless you have an abnormal result and your provider advises testing more often).    If you get Pap tests with HPV test, you only need to test every 5 years, unless you have an abnormal result.     You do not need a Pap test if your uterus was removed (hysterectomy) and you have not had cancer.    You should be tested each year for STDs (sexually transmitted diseases) if you're at risk.     Have a mammogram every 1 to 2 years.    Have a colonoscopy at age 50, or have a yearly FIT test (stool test). These exams screen for colon cancer.      Have a cholesterol test every 5 years, or more often if advised.    Have a diabetes test (fasting glucose) every three years. If you are at risk for diabetes, you should have this test more often.     If you are at risk for osteoporosis (brittle bone disease), think about having a bone density scan (DEXA).    Shots: Get a flu shot each year. Get a tetanus shot every 10 years.    Nutrition:     Eat at least 5 servings of fruits and vegetables each day.    Eat whole-grain bread, whole-wheat pasta and brown rice instead of white grains and rice.    Talk to your provider about Calcium and Vitamin D.     Lifestyle    Exercise at least 150  minutes a week (30 minutes a day, 5 days a week). This will help you control your weight and prevent disease.    Limit alcohol to one drink per day.    No smoking.     Wear sunscreen to prevent skin cancer.     See your dentist every six months for an exam and cleaning.    See your eye doctor every 1 to 2 years.          Follow-ups after your visit        Your next 10 appointments already scheduled     Apr 17, 2017  8:00 AM CDT   MA SCREENING DIGITAL BILATERAL with URBCMA1   [facility] ([facility])    [address] [place] [address], [address]  [facility] [address] [phone]           Do not use any powder, lotion or deodorant under your arms or on your breast. If you do, we will ask you to remove it before your exam.  Wear comfortable, two-piece clothing.  If you have any allergies, tell your care team.  Bring any previous mammograms from other facilities or have them mailed to the breast center.            Aug 22, 2017  9:40 AM CDT   (Arrive by 9:25 AM)   Return Visit with CLOTILDE Ko [facility] ([facility])    [address]  [address]  [facility] [address] [phone]              Who to contact     If you have questions or need follow up information about today's clinic visit or your schedule please contact [facility] directly at [phone].  Normal or non-critical lab and imaging results will be communicated to you by MyChart, letter or phone within 4 business days after the clinic has received the results. If you do not hear from us within 7 days, please contact the clinic through MyChart or phone. If you have a critical or abnormal lab result, we will notify you by phone as soon as possible.  Submit refill requests through InsideView or call your pharmacy and they will forward the refill request to us. Please allow 3 business days for your refill to be completed.          Additional Information About  "Your Visit        MyChart Information     Katango gives you secure access to your electronic health record. If you see a primary care provider, you can also send messages to your care team and make appointments. If you have questions, please call your primary care clinic.  If you do not have a primary care provider, please call [phone] and they will assist you.        Care EveryWhere ID     This is your Care EveryWhere ID. This could be used by other organizations to access your [place] medical records  [id number]        Your Vitals Were     Pulse Temperature Height Last Period Pulse Oximetry BMI (Body Mass Index)    88 98.5  F (36.9  C) (Oral) 5' 6\" (1.676 m) 09/15/2007 96% 21.98 kg/m2       Blood Pressure from Last 3 Encounters:   04/12/17 122/74   02/21/17 128/80   12/16/16 128/70    Weight from Last 3 Encounters:   04/12/17 136 lb 3.2 oz (61.8 kg)   02/21/17 136 lb (61.7 kg)   12/16/16 132 lb 1.6 oz (59.9 kg)              We Performed the Following     Hepatitis C Screen Reflex to HCV RNA Quant and Genotype     Lipid Profile with reflex to direct LDL     Vitamin B12        Primary Care Provider Office Phone # Fax #    Mary TARI Leonardo [phone]       [facility] [address] [place], [place] [id number]        Thank you!     Thank you for choosing [facility]  for your care. Our goal is always to provide you with excellent care. Hearing back from our patients is one way we can continue to improve our services. Please take a few minutes to complete the written survey that you may receive in the mail after your visit with us. Thank you!             Your Updated Medication List - Protect others around you: Learn how to safely use, store and throw away your medicines at www.disposemymeds.org.          This list is accurate as of: 4/12/17  4:06 PM.  Always use your most recent med list.                   Brand Name Dispense Instructions for use    CALCIUM 500 + D PO "      Take by mouth 2 times daily       glutamine 500 MG Tabs      Take by mouth daily       IMODIUM A-D PO      Take by mouth as needed Reported on 2/21/2017       MAGNESIUM CARBONATE PO      Take 125 mg by mouth daily       VITAMIN-B COMPLEX PO      Take by mouth daily

## 2017-04-12 NOTE — NURSING NOTE
"Chief Complaint   Patient presents with     Physical     /74  Pulse 88  Temp 98.5  F (36.9  C) (Oral)  Ht 5' 6\" (1.676 m)  Wt 136 lb 3.2 oz (61.8 kg)  LMP 09/15/2007  SpO2 96%  BMI 21.98 kg/m2 Estimated body mass index is 21.98 kg/(m^2) as calculated from the following:    Height as of this encounter: 5' 6\" (1.676 m).    Weight as of this encounter: 136 lb 3.2 oz (61.8 kg).  BP completed using cuff size: regular       Health Maintenance due pending provider review:  NONE    n/a    Munira Dean CMA  "

## 2017-04-13 LAB
CHOLEST SERPL-MCNC: 179 MG/DL
HCV AB SERPL QL IA: NORMAL
HDLC SERPL-MCNC: 81 MG/DL
LDLC SERPL CALC-MCNC: 85 MG/DL
NONHDLC SERPL-MCNC: 98 MG/DL
TRIGL SERPL-MCNC: 64 MG/DL
VIT B12 SERPL-MCNC: 568 PG/ML (ref 193–986)

## 2017-04-14 NOTE — PROGRESS NOTES
Dear Leydi,   Your test results are all back -   -All of your labs are normal.  Let us know if you have any questions.  -Mary Leonardo, DO

## 2017-04-17 ENCOUNTER — RADIANT APPOINTMENT (OUTPATIENT)
Dept: MAMMOGRAPHY | Facility: CLINIC | Age: 62
End: 2017-04-17
Attending: FAMILY MEDICINE
Payer: COMMERCIAL

## 2017-04-17 DIAGNOSIS — Z12.31 VISIT FOR SCREENING MAMMOGRAM: ICD-10-CM

## 2017-04-17 PROCEDURE — G0202 SCR MAMMO BI INCL CAD: HCPCS

## 2017-09-12 ENCOUNTER — ONCOLOGY VISIT (OUTPATIENT)
Dept: ONCOLOGY | Facility: CLINIC | Age: 62
End: 2017-09-12
Attending: NURSE PRACTITIONER
Payer: COMMERCIAL

## 2017-09-12 VITALS
HEART RATE: 61 BPM | SYSTOLIC BLOOD PRESSURE: 130 MMHG | RESPIRATION RATE: 14 BRPM | DIASTOLIC BLOOD PRESSURE: 77 MMHG | TEMPERATURE: 97.9 F | WEIGHT: 134 LBS | OXYGEN SATURATION: 99 % | BODY MASS INDEX: 21.63 KG/M2

## 2017-09-12 DIAGNOSIS — C54.1 ENDOMETRIAL/UTERINE ADENOCARCINOMA (H): Primary | ICD-10-CM

## 2017-09-12 PROCEDURE — 99213 OFFICE O/P EST LOW 20 MIN: CPT | Mod: ZP | Performed by: NURSE PRACTITIONER

## 2017-09-12 PROCEDURE — 99212 OFFICE O/P EST SF 10 MIN: CPT | Mod: ZF

## 2017-09-12 ASSESSMENT — PAIN SCALES - GENERAL: PAINLEVEL: NO PAIN (0)

## 2017-09-12 NOTE — NURSING NOTE
"Oncology Rooming Note    September 12, 2017 9:44 AM   Leydi Zaragoza is a 62 year old female who presents for:    No chief complaint on file.    Initial Vitals: /77  Pulse 61  Temp 97.9  F (36.6  C) (Oral)  Resp 14  Wt 60.8 kg (134 lb)  LMP 09/15/2007  SpO2 99%  BMI 21.63 kg/m2 Estimated body mass index is 21.63 kg/(m^2) as calculated from the following:    Height as of 4/12/17: 1.676 m (5' 6\").    Weight as of this encounter: 60.8 kg (134 lb). Body surface area is 1.68 meters squared.  No Pain (0) Comment: Data Unavailable   Patient's last menstrual period was 09/15/2007.  Allergies reviewed: Yes  Medications reviewed: Yes    Medications: Medication refills not needed today.  Pharmacy name entered into [employer]:    [id number] - [place] - [address]  [id number] - [place] - [address]    Clinical concerns:      6 minutes for nursing intake (face to face time)     Nadeen Frias CMA                "

## 2017-09-12 NOTE — LETTER
2017       RE: Leydi Zaragoza  [address]  [facility] [id number]     Dear Colleague,    Thank you for referring your patient, Leydi Zaragoza, to the [facility]. Please see a copy of my visit note below.                Follow Up Notes on Referred Patient    Date: 2017     RE: Leydi Zaragoza  : 1955  BARB: 2017    Leydi Zaragoza is a 62 year old woman with a diagnosis of stage 1A grade 1 endometrioid adenocarcinoma s/p TLH, BSO on 13. She is here today for a surveillance visit. Denies vulvar or perineal irritation. She has chronic difficulty sleeping, urinary inc, vaginal dryness, and generalized arthralgias and myalgias. Denies abd pain or constipation. Has occasional diarrhea from microscopic colitis. She is up to date on her colonoscopy and mammo.    Cancer History:    The patient presented for evaluation of an episode of postmenopausal bleeding 13. Also c/o RUQ pain. No cramping. Pelvic ultrasound done on 13 normal uterus and ovaries, endometrial cavity containing three masses measuring 2.1 x 2.1 x 1.2 cm, 1.2 x 1.3 x 0.8 cm, 0.8 x 1.0 x 0.7 cm.    13: Dilatation and curettage, operative hysteroscopy with MyoSure morcellation of endometrial polyps with Jaylene Jacobson MD    FINAL DIAGNOSIS:  Uterus, endometrium, polyp:  - Complex atypical hyperplasia.    Uterus, endometrium, polypectomy:  - Adenocarcinoma arising in complex atypical hyperplasia. Fragmented, but markedly atypical endometrium. There is marked cytologic atypia. In some areas, branching glandular forms are closely opposed. There is mild squamoid morular metaplasia. Some features are worrisome for vicente carcinoma; however, the biopsy is quite fragmented. Separately submitted fragments labeled morcellated polyp shows similar features with more confluent areas, thus meeting criteria for adenocarcinoma .   13: Exam under anesthesia, total laparoscopic hysterectomy,  bilateral salpingo-oophorectomy, pelvic washings, cystoscopy    Pathologic Staging: pT1a pNX.  Primary Tumor (pT): pT1a [1A]: Tumor invades less than one-half of the myometrium.  Regional lymph nodes (pN): pNX: Cannot be assessed  No nodes submitted or found  Distant Metastasis (pM): Not applicable.  7/17/13: Post op visit. Pathology reviewed in detail with the patient and copy provided. In light of the grade 1 and limited depth of invasion < 30%, no addition adjuvant therapy is recommended at this time. Potential risk of recurrence reviewed and sign for possible recurrence reviewed. Plan for scheduled tumor surveillance every 3 months for the first 2 years and them every 6 months for an addition 3 years. Patient is encouraged to continue follow up for routine cancer surveillance including breast and colon cancer.  10/24/13: Pap NIL.      10/30/14: Surveillance visit. Feeling well. She denies any vaginal bleeding, no new changes in her bowel (has seen her PCP for diarrhea and is being worked up) or bladder habits (has scant amount of urine loss in the morning), no nausea/emesis, no lower extremity edema, and no difficulties eating or sleeping. She denies any abdominal discomfort/bloating, no fevers or chills, and no chest pain or shortness of breath. She states her health screenings are current, she is sexually active and using a lubricant, and reports occassional dryness for which she uses coconut oil with good results.       02/11/15: Survelliance visit. She reports feeling well. Denies any vaginal bleeding, abd pain/discomfort, or abd bloating/cramping. No new changes in her bowel (has seen her PCP for diarrhea and is being worked up), she takes benafiber and has had regular bm. No change in bladder habits, no nausea/emesis, no lower extremity edema, and no difficulties eating or sleeping. No fevers or chills, and no chest pain or shortness of breath. She states her health screenings are current except she is due  "for mammogram, she is sexually active and using a lubricant, and reports occassional dryness for which she uses coconut oil with good results.       4/21/15: Patient called the clinic and spoke with clinic staff: Assessment: Pt of Dr. Khan with HX of grade 1 stage IA endometrioid adenocarcinoma s/p TLH, BSO on 7/1/13 . Is scheduled to have her 3 month check on 5/12. However would like a sooner appointment. Pt has discovered a lump between her vagina and rectum which is irritated and when touched has a pin prick sensation. Difficult for pt to assess. Two friends who were diagnosed with cancer at the same time she was have within the last month been diagnosed with metastatic cancer, \"they are goners\" Pt is very concerned. Plan: Suggested pt see her PCP now but does not want to do that. Sooner appointment given on 4/28 at 10:20 AM      4/27/15: Follow up of lump between her vagina and rectum which is irritated when touched that has a pin prick sensation. She notes RLQ discomfort stabbing sensation off and on. 3 month follow up.  7/28/15: 3 month follow up  02/9/16: 6 month follow up   8/9/16: 6 month follow up   2/21/17: 6 month follow up   9/12/17: 6 month follow up     Review of Systems:      Systemic no weight changes; no fever; no chills; occasional night sweats; no appetite changes + difficulty sleeping  Skin no rashes, or lesions. Right vulvar lesion followed by derm and benign    Eye no irritation; no changes in vision  Magali-Laryngeal no dysphagia; no hoarseness    Pulmonary no cough; no shortness of breath  Cardiovascular no chest pain; no palpitations  Gastrointestinal no diarrhea; no constipation; no abdominal pain; no changes in bowel habits; no blood in stool.  Genitourinary no urinary frequency; no urinary urgency; no dysuria; no pain; no abnormal vaginal discharge; no abnormal vaginal bleeding; + vaginal dryness + decrease libido + chronic inc that remains the same    Musculoskeletal + myalgias; + " arthralgias; + tingling remains the same  Psychiatric no depressed mood; no anxiety    Hematologic no tender lymph nodes; no noticeable swellings or lumps    Endocrine + hot flashes controlled; no heat/cold intolerance    Neurological no tremor; occasional numbness and tingling to ue and le; no headaches; + difficulty sleeping    Past Medical History:    Past Medical History:   Diagnosis Date     Acute bronchospasm     one year but resolved - exercise induced.     Uterine polyp          Past Surgical History:    Past Surgical History:   Procedure Laterality Date     COLONOSCOPY  10/11/11     DILATION AND CURETTAGE, OPERATIVE HYSTEROSCOPY WITH MORCELLATOR, COMBINED  6/20/2013    Procedure: COMBINED DILATION AND CURETTAGE, OPERATIVE HYSTEROSCOPY WITH MORCELLATOR;  Dilation And Curettage, Operative Hysteroscopy With Myosure, Morcellation of endometrial polyps.;  Surgeon: Jaylene Jacobson MD;  Location: [facility] OR      TOOTH EXTRACTION W/FORCEP       LAPAROSCOPIC HYSTERECTOMY TOTAL, BILATERAL SALPINGO-OOPHORECTOMY, NODE DISSECTION, COMBINED  7/1/2013    Procedure: COMBINED LAPAROSCOPIC HYSTERECTOMY TOTAL, SALPINGO-OOPHORECTOMY, NODE DISSECTION;  Total Laparoscopic Hysterectomy, Bilateral Salpingo Oophorectomy, Cystoscopy;  Surgeon: Jaelyn Khan MD;  Location: [facility] OR         Health Maintenance Due   Topic Date Due     INFLUENZA VACCINE (SYSTEM ASSIGNED)  09/01/2017     ADVANCE DIRECTIVE PLANNING Q5 YRS  09/12/2017       Current Medications:     Current Outpatient Prescriptions   Medication Sig Dispense Refill     MAGNESIUM CARBONATE PO Take 125 mg by mouth daily        B Complex Vitamins (VITAMIN-B COMPLEX PO) Take by mouth daily        glutamine 500 MG TABS Take by mouth daily        Loperamide HCl (IMODIUM A-D PO) Take by mouth as needed Reported on 2/21/2017       Calcium Carbonate-Vitamin D (CALCIUM 500 + D PO) Take by mouth 2 times daily           Allergies:        Allergies   Allergen Reactions      Amoxicillin      rash        Social History:     Social History   Substance Use Topics     Smoking status: Never Smoker     Smokeless tobacco: Never Used     Alcohol use 0.0 oz/week     0 Standard drinks or equivalent per week      Comment: Rare, 1 per week       History   Drug Use No         Family History:     The patient's family history is notable for     Family History   Problem Relation Age of Onset     C.A.D. Mother      stents at age 78, passed away 1/30/10     DIABETES Mother      Hypertension Mother      HEART DISEASE Mother      Uses heart medication for Tachycardia, has had stent placement 2 at once     Arthritis Mother      GASTROINTESTINAL DISEASE Mother      bowel obstruction - passed away age 86     Hypertension Father      Neurologic Disorder Daughter      astrocytoma     Connective Tissue Disorder Daughter      jra, in remission, melonoma     CANCER Daughter      melanoma     Other - See Comments Daughter      atypical ductal hyperplasia         Physical Exam:     /77  Pulse 61  Temp 97.9  F (36.6  C) (Oral)  Resp 14  Wt 60.8 kg (134 lb)  LMP 09/15/2007  SpO2 99%  BMI 21.63 kg/m2  Body mass index is 21.63 kg/(m^2).    General Appearance: healthy and alert, no distress     HEENT:  no palpable nodules or masses        Cardiovascular: regular rate and rhythm, no gallops, rubs or murmurs     Respiratory: lungs clear, no rales, rhonchi or wheezes    Musculoskeletal: extremities non tender and without edema    Skin: no lesions or rashes     Neurological: normal gait, no gross defects     Psychiatric: appropriate mood and affect                               Hematological: normal cervical, supraclavicular and inguinal lymph nodes     Gastrointestinal:       abdomen soft, non-tender, non-distended, no organomegaly or masses    Genitourinary: External genitalia and urethral meatus appears normal. Patient has Right vulvar nevi which remains the same and she follows with dermatology. No abnormal  lesions noted. Vagina is smooth without nodularity or masses. Cervix surgically absent. Bimanual exam reveal no masses, nodularity or fullness. Recto-vaginal exam confirms these findings.      Assessment:      Leydi Zaragoza is a 61 year old woman with a diagnosis of stage IA grade 1 endometrioid adenocarcinoma s/p TLH, BSO on 7/1/13. She is here today for a surveillance visit.      Plan:       1.) Stage 1A Grade 1 Endometrioid Adenocarcinoma. Patient to RTC in 6 months for her next surveillance visit. Reviewed her remaining surveillance every 6 months for an additional 3 years until 7/2018. Reviewed signs and symptoms for when she should contact the clinic or seek additional care. Patient to contact the clinic with any questions or concerns in the interim.  2.) Vaginal Dryness. Reviewed use of lubricant for daily use as needed; adequate amount of lubricant for intercourse, as well as changing positions for comfort. Try OTC replense.  3.) Labs and/or tests ordered include: None today.  4.) Health maintenance issues addressed today include annual health maintenance and non-gyn issues with PCP.  Sera Tavera CNP  9/12/2017 10:20 AM      CC  Patient Care Team:  Mary Leonardo DO as PCP - General  Mary Rousseau PsyD as Psychologist (Psychology)

## 2017-09-12 NOTE — PROGRESS NOTES
Follow Up Notes on Referred Patient    Date: 2017     RE: Leydi Zaragoza  : 1955  BARB: 2017    Leydi Zaragoza is a 62 year old woman with a diagnosis of stage 1A grade 1 endometrioid adenocarcinoma s/p TLH, BSO on 13. She is here today for a surveillance visit. Denies vulvar or perineal irritation. She has chronic difficulty sleeping, urinary inc, vaginal dryness, and generalized arthralgias and myalgias. Denies abd pain or constipation. Has occasional diarrhea from microscopic colitis. She is up to date on her colonoscopy and mammo.    Cancer History:    The patient presented for evaluation of an episode of postmenopausal bleeding 13. Also c/o RUQ pain. No cramping. Pelvic ultrasound done on 13 normal uterus and ovaries, endometrial cavity containing three masses measuring 2.1 x 2.1 x 1.2 cm, 1.2 x 1.3 x 0.8 cm, 0.8 x 1.0 x 0.7 cm.    13: Dilatation and curettage, operative hysteroscopy with MyoSure morcellation of endometrial polyps with Jaylene Jacobson MD    FINAL DIAGNOSIS:  Uterus, endometrium, polyp:  - Complex atypical hyperplasia.    Uterus, endometrium, polypectomy:  - Adenocarcinoma arising in complex atypical hyperplasia. Fragmented, but markedly atypical endometrium. There is marked cytologic atypia. In some areas, branching glandular forms are closely opposed. There is mild squamoid morular metaplasia. Some features are worrisome for vicente carcinoma; however, the biopsy is quite fragmented. Separately submitted fragments labeled morcellated polyp shows similar features with more confluent areas, thus meeting criteria for adenocarcinoma .   13: Exam under anesthesia, total laparoscopic hysterectomy, bilateral salpingo-oophorectomy, pelvic washings, cystoscopy    Pathologic Staging: pT1a pNX.  Primary Tumor (pT): pT1a [1A]: Tumor invades less than one-half of the myometrium.  Regional lymph nodes (pN): pNX: Cannot be assessed  No nodes submitted  or found  Distant Metastasis (pM): Not applicable.  7/17/13: Post op visit. Pathology reviewed in detail with the patient and copy provided. In light of the grade 1 and limited depth of invasion < 30%, no addition adjuvant therapy is recommended at this time. Potential risk of recurrence reviewed and sign for possible recurrence reviewed. Plan for scheduled tumor surveillance every 3 months for the first 2 years and them every 6 months for an addition 3 years. Patient is encouraged to continue follow up for routine cancer surveillance including breast and colon cancer.  10/24/13: Pap NIL.      10/30/14: Surveillance visit. Feeling well. She denies any vaginal bleeding, no new changes in her bowel (has seen her PCP for diarrhea and is being worked up) or bladder habits (has scant amount of urine loss in the morning), no nausea/emesis, no lower extremity edema, and no difficulties eating or sleeping. She denies any abdominal discomfort/bloating, no fevers or chills, and no chest pain or shortness of breath. She states her health screenings are current, she is sexually active and using a lubricant, and reports occassional dryness for which she uses coconut oil with good results.       02/11/15: Survelliance visit. She reports feeling well. Denies any vaginal bleeding, abd pain/discomfort, or abd bloating/cramping. No new changes in her bowel (has seen her PCP for diarrhea and is being worked up), she takes benafiber and has had regular bm. No change in bladder habits, no nausea/emesis, no lower extremity edema, and no difficulties eating or sleeping. No fevers or chills, and no chest pain or shortness of breath. She states her health screenings are current except she is due for mammogram, she is sexually active and using a lubricant, and reports occassional dryness for which she uses coconut oil with good results.       4/21/15: Patient called the clinic and spoke with clinic staff: Assessment: Pt of Dr. Khan with HX  "of grade 1 stage IA endometrioid adenocarcinoma s/p TLH, BSO on 7/1/13 . Is scheduled to have her 3 month check on 5/12. However would like a sooner appointment. Pt has discovered a lump between her vagina and rectum which is irritated and when touched has a pin prick sensation. Difficult for pt to assess. Two friends who were diagnosed with cancer at the same time she was have within the last month been diagnosed with metastatic cancer, \"they are goners\" Pt is very concerned. Plan: Suggested pt see her PCP now but does not want to do that. Sooner appointment given on 4/28 at 10:20 AM      4/27/15: Follow up of lump between her vagina and rectum which is irritated when touched that has a pin prick sensation. She notes RLQ discomfort stabbing sensation off and on. 3 month follow up.  7/28/15: 3 month follow up  02/9/16: 6 month follow up   8/9/16: 6 month follow up   2/21/17: 6 month follow up   9/12/17: 6 month follow up     Review of Systems:      Systemic no weight changes; no fever; no chills; occasional night sweats; no appetite changes + difficulty sleeping  Skin no rashes, or lesions. Right vulvar lesion followed by derm and benign    Eye no irritation; no changes in vision  Magali-Laryngeal no dysphagia; no hoarseness    Pulmonary no cough; no shortness of breath  Cardiovascular no chest pain; no palpitations  Gastrointestinal no diarrhea; no constipation; no abdominal pain; no changes in bowel habits; no blood in stool.  Genitourinary no urinary frequency; no urinary urgency; no dysuria; no pain; no abnormal vaginal discharge; no abnormal vaginal bleeding; + vaginal dryness + decrease libido + chronic inc that remains the same    Musculoskeletal + myalgias; + arthralgias; + tingling remains the same  Psychiatric no depressed mood; no anxiety    Hematologic no tender lymph nodes; no noticeable swellings or lumps    Endocrine + hot flashes controlled; no heat/cold intolerance    Neurological no tremor; occasional " numbness and tingling to ue and le; no headaches; + difficulty sleeping    Past Medical History:    Past Medical History:   Diagnosis Date     Acute bronchospasm     one year but resolved - exercise induced.     Uterine polyp          Past Surgical History:    Past Surgical History:   Procedure Laterality Date     COLONOSCOPY  10/11/11     DILATION AND CURETTAGE, OPERATIVE HYSTEROSCOPY WITH MORCELLATOR, COMBINED  6/20/2013    Procedure: COMBINED DILATION AND CURETTAGE, OPERATIVE HYSTEROSCOPY WITH MORCELLATOR;  Dilation And Curettage, Operative Hysteroscopy With Myosure, Morcellation of endometrial polyps.;  Surgeon: Jaylene Jacobson MD;  Location: [facility] OR     HC TOOTH EXTRACTION W/FORCEP       LAPAROSCOPIC HYSTERECTOMY TOTAL, BILATERAL SALPINGO-OOPHORECTOMY, NODE DISSECTION, COMBINED  7/1/2013    Procedure: COMBINED LAPAROSCOPIC HYSTERECTOMY TOTAL, SALPINGO-OOPHORECTOMY, NODE DISSECTION;  Total Laparoscopic Hysterectomy, Bilateral Salpingo Oophorectomy, Cystoscopy;  Surgeon: Jaelyn Khan MD;  Location: [facility] OR         Health Maintenance Due   Topic Date Due     INFLUENZA VACCINE (SYSTEM ASSIGNED)  09/01/2017     ADVANCE DIRECTIVE PLANNING Q5 YRS  09/12/2017       Current Medications:     Current Outpatient Prescriptions   Medication Sig Dispense Refill     MAGNESIUM CARBONATE PO Take 125 mg by mouth daily        B Complex Vitamins (VITAMIN-B COMPLEX PO) Take by mouth daily        glutamine 500 MG TABS Take by mouth daily        Loperamide HCl (IMODIUM A-D PO) Take by mouth as needed Reported on 2/21/2017       Calcium Carbonate-Vitamin D (CALCIUM 500 + D PO) Take by mouth 2 times daily           Allergies:        Allergies   Allergen Reactions     Amoxicillin      rash        Social History:     Social History   Substance Use Topics     Smoking status: Never Smoker     Smokeless tobacco: Never Used     Alcohol use 0.0 oz/week     0 Standard drinks or equivalent per week      Comment: Rare, 1 per week        History   Drug Use No         Family History:     The patient's family history is notable for     Family History   Problem Relation Age of Onset     C.A.D. Mother      stents at age 78, passed away 1/30/10     DIABETES Mother      Hypertension Mother      HEART DISEASE Mother      Uses heart medication for Tachycardia, has had stent placement 2 at once     Arthritis Mother      GASTROINTESTINAL DISEASE Mother      bowel obstruction - passed away age 86     Hypertension Father      Neurologic Disorder Daughter      astrocytoma     Connective Tissue Disorder Daughter      jra, in remission, melonoma     CANCER Daughter      melanoma     Other - See Comments Daughter      atypical ductal hyperplasia         Physical Exam:     /77  Pulse 61  Temp 97.9  F (36.6  C) (Oral)  Resp 14  Wt 60.8 kg (134 lb)  LMP 09/15/2007  SpO2 99%  BMI 21.63 kg/m2  Body mass index is 21.63 kg/(m^2).    General Appearance: healthy and alert, no distress     HEENT:  no palpable nodules or masses        Cardiovascular: regular rate and rhythm, no gallops, rubs or murmurs     Respiratory: lungs clear, no rales, rhonchi or wheezes    Musculoskeletal: extremities non tender and without edema    Skin: no lesions or rashes     Neurological: normal gait, no gross defects     Psychiatric: appropriate mood and affect                               Hematological: normal cervical, supraclavicular and inguinal lymph nodes     Gastrointestinal:       abdomen soft, non-tender, non-distended, no organomegaly or masses    Genitourinary: External genitalia and urethral meatus appears normal. Patient has Right vulvar nevi which remains the same and she follows with dermatology. No abnormal lesions noted. Vagina is smooth without nodularity or masses. Cervix surgically absent. Bimanual exam reveal no masses, nodularity or fullness. Recto-vaginal exam confirms these findings.      Assessment:      Leydi Zaragoza is a 61 year old woman with a  diagnosis of stage IA grade 1 endometrioid adenocarcinoma s/p TLH, BSO on 7/1/13. She is here today for a surveillance visit.      Plan:       1.) Stage 1A Grade 1 Endometrioid Adenocarcinoma. Patient to RTC in 6 months for her next surveillance visit. Reviewed her remaining surveillance every 6 months for an additional 3 years until 7/2018. Reviewed signs and symptoms for when she should contact the clinic or seek additional care. Patient to contact the clinic with any questions or concerns in the interim.  2.) Vaginal Dryness. Reviewed use of lubricant for daily use as needed; adequate amount of lubricant for intercourse, as well as changing positions for comfort. Try OTC replense.  3.) Labs and/or tests ordered include: None today.  4.) Health maintenance issues addressed today include annual health maintenance and non-gyn issues with PCP.  Sera Tavera CNP  9/12/2017 10:20 AM      CC  Patient Care Team:  Mary Leonardo DO as PCP - General  Mary Rousseau PsyD as Psychologist (Psychology)  SELF, REFERRED

## 2017-09-12 NOTE — MR AVS SNAPSHOT
After Visit Summary   9/12/2017    Leydi Zaragoza    MRN: [id number]           Patient Information     Date Of Birth          1955        Visit Information        Provider Department      9/12/2017 9:40 AM Sera Tavera APRN CNP [facility]        Today's Diagnoses     Endometrial/uterine adenocarcinoma (H)    -  1       Follow-ups after your visit        Follow-up notes from your care team     Return in about 6 months (around 3/12/2018).      Who to contact     If you have questions or need follow up information about today's clinic visit or your schedule please contact [facility] directly at [phone].  Normal or non-critical lab and imaging results will be communicated to you by [employer], letter or phone within 4 business days after the clinic has received the results. If you do not hear from us within 7 days, please contact the clinic through [employer] or phone. If you have a critical or abnormal lab result, we will notify you by phone as soon as possible.  Submit refill requests through [employer] or call your pharmacy and they will forward the refill request to us. Please allow 3 business days for your refill to be completed.          Additional Information About Your Visit        MyChart Information     [employer] gives you secure access to your electronic health record. If you see a primary care provider, you can also send messages to your care team and make appointments. If you have questions, please call your primary care clinic.  If you do not have a primary care provider, please call [phone] and they will assist you.        Care EveryWhere ID     This is your Care EveryWhere ID. This could be used by other organizations to access your [facility] medical records  [id number]        Your Vitals Were     Pulse Temperature Respirations Last Period Pulse Oximetry BMI (Body Mass Index)    61 97.9  F (36.6  C) (Oral) 14 09/15/2007 99% 21.63 kg/m2        Blood Pressure from Last 3 Encounters:   09/12/17 130/77   04/12/17 122/74   02/21/17 128/80    Weight from Last 3 Encounters:   09/12/17 60.8 kg (134 lb)   04/12/17 61.8 kg (136 lb 3.2 oz)   02/21/17 61.7 kg (136 lb)              Today, you had the following     No orders found for display       Primary Care Provider Office Phone # Fax #    Mary Leonardo,  [phone] [address] [address]        Equal Access to Services     [facility] : Hadii jamaal antonio hadmarcelina Sovilla, wamoncho dias, sindy kaalmajose quintanilla, raiza sands . [facility] [phone].    ATENCIÓN: Si habla español, tiene a youssef disposición servicios gratuitos de asistencia lingüística. [facility] [phone].    We comply with applicable federal civil rights laws and [place] laws. We do not discriminate on the basis of race, color, national origin, age, disability sex, sexual orientation or gender identity.            Thank you!     Thank you for choosing [facility]  for your care. Our goal is always to provide you with excellent care. Hearing back from our patients is one way we can continue to improve our services. Please take a few minutes to complete the written survey that you may receive in the mail after your visit with us. Thank you!             Your Updated Medication List - Protect others around you: Learn how to safely use, store and throw away your medicines at www.disposemymeds.org.          This list is accurate as of: 9/12/17 10:20 AM.  Always use your most recent med list.                   Brand Name Dispense Instructions for use Diagnosis    CALCIUM 500 + D PO      Take by mouth 2 times daily        glutamine 500 MG Tabs      Take by mouth daily        IMODIUM A-D PO      Take by mouth as needed Reported on 2/21/2017        MAGNESIUM CARBONATE PO      Take 125 mg by mouth daily        VITAMIN-B COMPLEX PO      Take by mouth daily

## 2018-03-26 ASSESSMENT — ENCOUNTER SYMPTOMS
BLOATING: 1
JAUNDICE: 0
SINUS PAIN: 0
MYALGIAS: 0
DECREASED LIBIDO: 1
JOINT SWELLING: 0
POOR WOUND HEALING: 0
NECK MASS: 0
TROUBLE SWALLOWING: 0
MUSCLE WEAKNESS: 0
DYSURIA: 0
FLANK PAIN: 0
ARTHRALGIAS: 1
HEARTBURN: 0
SMELL DISTURBANCE: 0
NAUSEA: 0
HOARSE VOICE: 0
DIFFICULTY URINATING: 1
TASTE DISTURBANCE: 0
STIFFNESS: 0
SORE THROAT: 1
CONSTIPATION: 1
NAIL CHANGES: 0
NECK PAIN: 1
HOT FLASHES: 0
MUSCLE CRAMPS: 0
BACK PAIN: 0
SKIN CHANGES: 0
HEMATURIA: 0
DIARRHEA: 1
BOWEL INCONTINENCE: 0
RECTAL PAIN: 0
SINUS CONGESTION: 0
BLOOD IN STOOL: 0
ABDOMINAL PAIN: 1
VOMITING: 0

## 2018-03-26 NOTE — PROGRESS NOTES
Follow Up Notes on Referred Patient    Date: 3/27/2018      RE: Leydi Zaragoza  : 1955  BARB: 3/27/2018    eLydi Zaragoza is a 62 year old woman with a diagnosis of stage 1A grade 1 endometrioid adenocarcinoma s/p TLH, BSO on 13. She is here today for a surveillance visit. Patient notes that she has been doing fair. Has had a sore throat related to PND that improves with use of nasocort. She has had issues with constipation/diarrhea secondary to her microscopic colitis. Over the last 2-3 weeks she has had a increasing of episodes of significant abd pain that is relieved with laying on her left side. She has experienced these episodes a little more frequently then she has in the past. + abd bloating. No abd pain otherwise. Notes improved urinary inc secondary to kegal exercises. Continues to experience vaginal dryness and low libido that she is not concerned about.  Denies vulvar or perineal irritation. She has chronic difficulty sleeping, urinary inc, vaginal dryness, and generalized arthralgias and myalgias. She has a couple of skin changes and she is seeing dermatology in 2 weeks. Notes some increase forgetfulness with word finding. She has also noted some mild enlargement acromial end of her right clavical and is following up with PCP. She is up to date on her colonoscopy and mammo.    Cancer History:    The patient presented for evaluation of an episode of postmenopausal bleeding 13. Also c/o RUQ pain. No cramping. Pelvic ultrasound done on 13 normal uterus and ovaries, endometrial cavity containing three masses measuring 2.1 x 2.1 x 1.2 cm, 1.2 x 1.3 x 0.8 cm, 0.8 x 1.0 x 0.7 cm.    13: Dilatation and curettage, operative hysteroscopy with MyoSure morcellation of endometrial polyps with Jaylene Jacobson MD    FINAL DIAGNOSIS:  Uterus, endometrium, polyp:  - Complex atypical hyperplasia.    Uterus, endometrium, polypectomy:  - Adenocarcinoma arising in complex atypical  hyperplasia. Fragmented, but markedly atypical endometrium. There is marked cytologic atypia. In some areas, branching glandular forms are closely opposed. There is mild squamoid morular metaplasia. Some features are worrisome for vicente carcinoma; however, the biopsy is quite fragmented. Separately submitted fragments labeled morcellated polyp shows similar features with more confluent areas, thus meeting criteria for adenocarcinoma .   7/1/13: Exam under anesthesia, total laparoscopic hysterectomy, bilateral salpingo-oophorectomy, pelvic washings, cystoscopy  Pathologic Staging: pT1a pNX.  Primary Tumor (pT): pT1a [1A]: Tumor invades less than one-half of the myometrium.  Regional lymph nodes (pN): pNX: Cannot be assessed  No nodes submitted or found  Distant Metastasis (pM): Not applicable.  7/17/13: Post op visit. Pathology reviewed in detail with the patient and copy provided. In light of the grade 1 and limited depth of invasion < 30%, no addition adjuvant therapy is recommended at this time. Potential risk of recurrence reviewed and sign for possible recurrence reviewed. Plan for scheduled tumor surveillance every 3 months for the first 2 years and them every 6 months for an addition 3 years. Patient is encouraged to continue follow up for routine cancer surveillance including breast and colon cancer.  10/24/13: Pap NIL.      10/30/14: Surveillance visit. Feeling well. She denies any vaginal bleeding, no new changes in her bowel (has seen her PCP for diarrhea and is being worked up) or bladder habits (has scant amount of urine loss in the morning), no nausea/emesis, no lower extremity edema, and no difficulties eating or sleeping. She denies any abdominal discomfort/bloating, no fevers or chills, and no chest pain or shortness of breath. She states her health screenings are current, she is sexually active and using a lubricant, and reports occassional dryness for which she uses coconut oil with good results.  "      02/11/15: Survelliance visit. She reports feeling well. Denies any vaginal bleeding, abd pain/discomfort, or abd bloating/cramping. No new changes in her bowel (has seen her PCP for diarrhea and is being worked up), she takes benafiber and has had regular bm. No change in bladder habits, no nausea/emesis, no lower extremity edema, and no difficulties eating or sleeping. No fevers or chills, and no chest pain or shortness of breath. She states her health screenings are current except she is due for mammogram, she is sexually active and using a lubricant, and reports occassional dryness for which she uses coconut oil with good results.       4/21/15: Patient called the clinic and spoke with clinic staff: Assessment: Pt of Dr. Khan with HX of grade 1 stage IA endometrioid adenocarcinoma s/p TLH, BSO on 7/1/13 . Is scheduled to have her 3 month check on 5/12. However would like a sooner appointment. Pt has discovered a lump between her vagina and rectum which is irritated and when touched has a pin prick sensation. Difficult for pt to assess. Two friends who were diagnosed with cancer at the same time she was have within the last month been diagnosed with metastatic cancer, \"they are goners\" Pt is very concerned. Plan: Suggested pt see her PCP now but does not want to do that. Sooner appointment given on 4/28 at 10:20 AM      4/27/15: Follow up of lump between her vagina and rectum which is irritated when touched that has a pin prick sensation. She notes RLQ discomfort stabbing sensation off and on. 3 month follow up.  7/28/15: 3 month follow up  02/9/16: 6 month follow up   8/9/16: 6 month follow up   2/21/17: 6 month follow up   9/12/17: 6 month follow up   3/26/18: 6 month follow up     Review of Systems:      Systemic no weight changes; no fever; no chills; occasional night sweats; no appetite changes + difficulty sleeping  Skin no rashes, or lesions. Right vulvar lesion followed by derm and benign    Eye no " irritation; no changes in vision  Magali-Laryngeal no dysphagia; no hoarseness + sore throat secondary to PND   Pulmonary no cough; no shortness of breath  Cardiovascular no chest pain; no palpitations  Gastrointestinal + intermittent diarrhea; + intermittent constipation; + intermittent abdominal pain; no changes in bowel habits; no blood in stool.  Genitourinary no urinary frequency; no urinary urgency; no dysuria; no pain; no abnormal vaginal discharge; no abnormal vaginal bleeding; + vaginal dryness + decrease libido + chronic inc that remains the same    Musculoskeletal + myalgias; + arthralgias; + tingling remains the same  Psychiatric no depressed mood; no anxiety    Hematologic no tender lymph nodes; no noticeable swellings or lumps    Endocrine + hot flashes controlled; no heat/cold intolerance    Neurological no tremor; occasional numbness and tingling to ue and le; no headaches; + difficulty sleeping + change in memory    Answers for HPI/ROS submitted by the patient on 3/26/2018   General Symptoms: No  Skin Symptoms: Yes  HENT Symptoms: Yes  EYE SYMPTOMS: No  HEART SYMPTOMS: No  LUNG SYMPTOMS: No  INTESTINAL SYMPTOMS: Yes  URINARY SYMPTOMS: Yes  GYNECOLOGIC SYMPTOMS: Yes  BREAST SYMPTOMS: No  SKELETAL SYMPTOMS: Yes  BLOOD SYMPTOMS: No  NERVOUS SYSTEM SYMPTOMS: No  MENTAL HEALTH SYMPTOMS: No  Changes in hair: No  Changes in moles/birth marks: No  Itching: Yes  Rashes: No  Changes in nails: No  Acne: No  Hair in places you don't want it: No  Change in facial hair: No  Warts: No  Non-healing sores: No  Scarring: No  Flaking of skin: No  Color changes of hands/feet in cold : No  Sun sensitivity: No  Skin thickening: Yes  Ear pain: No  Ear discharge: No  Hearing loss: No  Tinnitus: No  Nosebleeds: No  Congestion: No  Sinus pain: No  Trouble swallowing: No   Voice hoarseness: No  Mouth sores: No  Sore throat: Yes  Tooth pain: No  Gum tenderness: No  Bleeding gums: No  Change in taste: No  Change in sense of smell:  No  Dry mouth: No  Hearing aid used: No  Neck lump: No  Heart burn or indigestion: No  Nausea: No  Vomiting: No  Abdominal pain: Yes  Bloating: Yes  Constipation: Yes  Diarrhea: Yes  Blood in stool: No  Black stools: No  Rectal or Anal pain: No  Fecal incontinence: No  Yellowing of skin or eyes: No  Vomit with blood: No  Change in stools: Yes  Trouble holding urine or incontinence: Yes  Pain or burning: No  Trouble starting or stopping: Yes  Increased frequency of urination: No  Blood in urine: No  Decreased frequency of urination: No  Frequent nighttime urination: Yes  Flank pain: No  Difficulty emptying bladder: Yes  Back pain: No  Muscle aches: No  Neck pain: Yes  Swollen joints: No  Joint pain: Yes  Bone pain: No  Muscle cramps: No  Muscle weakness: No  Joint stiffness: No  Bone fracture: No  Bleeding or spotting between periods: No  Heavy or painful periods: No  Irregular periods: No  Vaginal discharge: No  Hot flashes: No  Vaginal dryness: Yes  Genital ulcers: No  Reduced libido: Yes  Painful intercourse: No  Difficulty with sexual arousal: Yes  Post-menopausal bleeding: No    Past Medical History:    Past Medical History:   Diagnosis Date     Acute bronchospasm     one year but resolved - exercise induced.     Uterine polyp          Past Surgical History:    Past Surgical History:   Procedure Laterality Date     COLONOSCOPY  10/11/11     DILATION AND CURETTAGE, OPERATIVE HYSTEROSCOPY WITH MORCELLATOR, COMBINED  6/20/2013    Procedure: COMBINED DILATION AND CURETTAGE, OPERATIVE HYSTEROSCOPY WITH MORCELLATOR;  Dilation And Curettage, Operative Hysteroscopy With Myosure, Morcellation of endometrial polyps.;  Surgeon: Jaylene Jacobson MD;  Location: [facility] OR     HC TOOTH EXTRACTION W/FORCEP       LAPAROSCOPIC HYSTERECTOMY TOTAL, BILATERAL SALPINGO-OOPHORECTOMY, NODE DISSECTION, COMBINED  7/1/2013    Procedure: COMBINED LAPAROSCOPIC HYSTERECTOMY TOTAL, SALPINGO-OOPHORECTOMY, NODE DISSECTION;  Total  "Laparoscopic Hysterectomy, Bilateral Salpingo Oophorectomy, Cystoscopy;  Surgeon: Jaelyn Khan MD;  Location:  OR         Health Maintenance Due   Topic Date Due     ADVANCE DIRECTIVE PLANNING Q5 YRS  09/12/2017     MAMMO Q1 YR  04/17/2018       Current Medications:     Current Outpatient Prescriptions   Medication Sig Dispense Refill     Calcium Carbonate-Vitamin D (CALCIUM 500 + D PO) Take by mouth 2 times daily       Loperamide HCl (IMODIUM A-D PO) Take by mouth as needed Reported on 2/21/2017           Allergies:        Allergies   Allergen Reactions     Amoxicillin      rash        Social History:     Social History   Substance Use Topics     Smoking status: Never Smoker     Smokeless tobacco: Never Used     Alcohol use 0.0 oz/week     0 Standard drinks or equivalent per week      Comment: Rare, 1 per week       History   Drug Use No         Family History:     The patient's family history is notable     Family History   Problem Relation Age of Onset     C.A.D. Mother      stents at age 78, passed away 1/30/10     DIABETES Mother      Hypertension Mother      HEART DISEASE Mother      Uses heart medication for Tachycardia, has had stent placement 2 at once     Arthritis Mother      GASTROINTESTINAL DISEASE Mother      bowel obstruction - passed away age 86     Hypertension Father      Neurologic Disorder Daughter      astrocytoma     Connective Tissue Disorder Daughter      jra, in remission, melonoma     CANCER Daughter      melanoma     Other - See Comments Daughter      atypical ductal hyperplasia         Physical Exam:     /68  Pulse 67  Temp 98.1  F (36.7  C) (Oral)  Resp 16  Ht 1.676 m (5' 5.98\")  Wt 62.5 kg (137 lb 11.2 oz)  LMP 09/15/2007  SpO2 99%  BMI 22.24 kg/m2  Body mass index is 22.24 kg/(m^2).    General Appearance: healthy and alert, no distress     HEENT:  no palpable nodules or masses        Cardiovascular: regular rate and rhythm, no gallops, rubs or " murmurs     Respiratory: lungs clear, no rales, rhonchi or wheezes    Musculoskeletal: extremities non tender and without edema    Skin: no lesions or rashes     Neurological: normal gait, no gross defects     Psychiatric: appropriate mood and affect                               Hematological: normal cervical, supraclavicular and inguinal lymph nodes     Gastrointestinal:       abdomen soft, non-tender, non-distended    Genitourinary: External genitalia and urethral meatus appears normal. Patient has Right vulvar nevi which remains the same and she follows with dermatology. No other abnormal lesions noted. Vagina is smooth without nodularity or masses. Cervix surgically absent. Bimanual exam reveal no masses, nodularity or fullness. Recto-vaginal exam confirms these findings.      Assessment:      Leydi Zaragoza is a 62 year old woman with a diagnosis of stage IA grade 1 endometrioid adenocarcinoma s/p TLH, BSO on 7/1/13. She is here today for a surveillance visit.      Plan:       1.) Stage 1A Grade 1 Endometrioid Adenocarcinoma. Discussed patient increasing episodes of intermittent abd pain, constipation and diarrhea, recommend imaging for further evaluation. Patient prefers to keep a journal about her sx over the next 2-3 weeks and will call if sx persist and do not improve and will then proceed with imaging. Patient to [facility] in 6 months for her next surveillance visit. Reviewed her remaining surveillance every 6 months for an additional 3 years until 7/2018. Reviewed signs and symptoms for when she should contact the clinic or seek additional care. Patient to contact the clinic with any questions or concerns in the interim.  2.) Vaginal Dryness. Reviewed use of lubricant for daily use as needed; adequate amount of lubricant for intercourse, as well as changing positions for comfort.  3.) Labs and/or tests ordered include: None today.  4.) Health maintenance issues addressed today include follow up for annual  health maintenance and non-gyn issues with PCP.      Patient reports understanding and will contact the clinic in 2-3 weeks if her sx do not improve and will complete CT.   Sera Tavera CNP  3/28/2018 2:16 PM    CC  Patient Care Team:  Mary Leonardo DO as PCP - General  Mary Rousseau PsyD as Psychologist (Psychology)  SELF, REFERRED

## 2018-03-27 ENCOUNTER — ONCOLOGY VISIT (OUTPATIENT)
Dept: ONCOLOGY | Facility: CLINIC | Age: 63
End: 2018-03-27
Attending: NURSE PRACTITIONER
Payer: COMMERCIAL

## 2018-03-27 VITALS
WEIGHT: 137.7 LBS | BODY MASS INDEX: 22.13 KG/M2 | HEIGHT: 66 IN | TEMPERATURE: 98.1 F | DIASTOLIC BLOOD PRESSURE: 68 MMHG | SYSTOLIC BLOOD PRESSURE: 143 MMHG | RESPIRATION RATE: 16 BRPM | HEART RATE: 67 BPM | OXYGEN SATURATION: 99 %

## 2018-03-27 DIAGNOSIS — C54.1 ENDOMETRIAL/UTERINE ADENOCARCINOMA (H): Primary | ICD-10-CM

## 2018-03-27 PROCEDURE — G0463 HOSPITAL OUTPT CLINIC VISIT: HCPCS | Mod: ZF

## 2018-03-27 PROCEDURE — 99213 OFFICE O/P EST LOW 20 MIN: CPT | Mod: ZP | Performed by: NURSE PRACTITIONER

## 2018-03-27 ASSESSMENT — PAIN SCALES - GENERAL: PAINLEVEL: NO PAIN (0)

## 2018-03-27 NOTE — LETTER
3/27/2018       RE: Leydi Zaragoza  [address]  [facility] [address]     Dear Colleague,    Thank you for referring your patient, Leydi Zaragoza, to the [facility]. Please see a copy of my visit note below.                Follow Up Notes on Referred Patient    Date: 3/27/2018      RE: Leydi Zaragoza  : 1955  BARB: 3/27/2018    Leydi Zaragoza is a 62 year old woman with a diagnosis of stage 1A grade 1 endometrioid adenocarcinoma s/p TLH, BSO on 13. She is here today for a surveillance visit. Patient notes that she has been doing fair. Has had a sore throat related to PND that improves with use of nasocort. She has had issues with constipation/diarrhea secondary to her microscopic colitis. Over the last 2-3 weeks she has had a increasing of episodes of significant abd pain that is relieved with laying on her left side. She has experienced these episodes a little more frequently then she has in the past. + abd bloating. No abd pain otherwise. Notes improved urinary inc secondary to kegal exercises. Continues to experience vaginal dryness and low libido that she is not concerned about.  Denies vulvar or perineal irritation. She has chronic difficulty sleeping, urinary inc, vaginal dryness, and generalized arthralgias and myalgias. She has a couple of skin changes and she is seeing dermatology in 2 weeks. Notes some increase forgetfulness with word finding. She has also noted some mild enlargement acromial end of her right clavical and is following up with PCP. She is up to date on her colonoscopy and mammo.    Cancer History:    The patient presented for evaluation of an episode of postmenopausal bleeding 13. Also c/o RUQ pain. No cramping. Pelvic ultrasound done on 13 normal uterus and ovaries, endometrial cavity containing three masses measuring 2.1 x 2.1 x 1.2 cm, 1.2 x 1.3 x 0.8 cm, 0.8 x 1.0 x 0.7 cm.    13: Dilatation and curettage, operative  hysteroscopy with MyoSure morcellation of endometrial polyps with Jaylene Jacobson MD    FINAL DIAGNOSIS:  Uterus, endometrium, polyp:  - Complex atypical hyperplasia.    Uterus, endometrium, polypectomy:  - Adenocarcinoma arising in complex atypical hyperplasia. Fragmented, but markedly atypical endometrium. There is marked cytologic atypia. In some areas, branching glandular forms are closely opposed. There is mild squamoid morular metaplasia. Some features are worrisome for vicente carcinoma; however, the biopsy is quite fragmented. Separately submitted fragments labeled morcellated polyp shows similar features with more confluent areas, thus meeting criteria for adenocarcinoma .   7/1/13: Exam under anesthesia, total laparoscopic hysterectomy, bilateral salpingo-oophorectomy, pelvic washings, cystoscopy  Pathologic Staging: pT1a pNX.  Primary Tumor (pT): pT1a [1A]: Tumor invades less than one-half of the myometrium.  Regional lymph nodes (pN): pNX: Cannot be assessed  No nodes submitted or found  Distant Metastasis (pM): Not applicable.  7/17/13: Post op visit. Pathology reviewed in detail with the patient and copy provided. In light of the grade 1 and limited depth of invasion < 30%, no addition adjuvant therapy is recommended at this time. Potential risk of recurrence reviewed and sign for possible recurrence reviewed. Plan for scheduled tumor surveillance every 3 months for the first 2 years and them every 6 months for an addition 3 years. Patient is encouraged to continue follow up for routine cancer surveillance including breast and colon cancer.  10/24/13: Pap NIL.      10/30/14: Surveillance visit. Feeling well. She denies any vaginal bleeding, no new changes in her bowel (has seen her PCP for diarrhea and is being worked up) or bladder habits (has scant amount of urine loss in the morning), no nausea/emesis, no lower extremity edema, and no difficulties eating or sleeping. She denies any abdominal  "discomfort/bloating, no fevers or chills, and no chest pain or shortness of breath. She states her health screenings are current, she is sexually active and using a lubricant, and reports occassional dryness for which she uses coconut oil with good results.       02/11/15: Survelliance visit. She reports feeling well. Denies any vaginal bleeding, abd pain/discomfort, or abd bloating/cramping. No new changes in her bowel (has seen her PCP for diarrhea and is being worked up), she takes benafiber and has had regular bm. No change in bladder habits, no nausea/emesis, no lower extremity edema, and no difficulties eating or sleeping. No fevers or chills, and no chest pain or shortness of breath. She states her health screenings are current except she is due for mammogram, she is sexually active and using a lubricant, and reports occassional dryness for which she uses coconut oil with good results.       4/21/15: Patient called the clinic and spoke with clinic staff: Assessment: Pt of Dr. Khan with HX of grade 1 stage IA endometrioid adenocarcinoma s/p TLH, BSO on 7/1/13 . Is scheduled to have her 3 month check on 5/12. However would like a sooner appointment. Pt has discovered a lump between her vagina and rectum which is irritated and when touched has a pin prick sensation. Difficult for pt to assess. Two friends who were diagnosed with cancer at the same time she was have within the last month been diagnosed with metastatic cancer, \"they are goners\" Pt is very concerned. Plan: Suggested pt see her PCP now but does not want to do that. Sooner appointment given on 4/28 at 10:20 AM      4/27/15: Follow up of lump between her vagina and rectum which is irritated when touched that has a pin prick sensation. She notes RLQ discomfort stabbing sensation off and on. 3 month follow up.  7/28/15: 3 month follow up  02/9/16: 6 month follow up   8/9/16: 6 month follow up   2/21/17: 6 month follow up   9/12/17: 6 month follow " up   3/26/18: 6 month follow up     Review of Systems:      Systemic no weight changes; no fever; no chills; occasional night sweats; no appetite changes + difficulty sleeping  Skin no rashes, or lesions. Right vulvar lesion followed by derm and benign    Eye no irritation; no changes in vision  Magali-Laryngeal no dysphagia; no hoarseness + sore throat secondary to PND   Pulmonary no cough; no shortness of breath  Cardiovascular no chest pain; no palpitations  Gastrointestinal + intermittent diarrhea; + intermittent constipation; + intermittent abdominal pain; no changes in bowel habits; no blood in stool.  Genitourinary no urinary frequency; no urinary urgency; no dysuria; no pain; no abnormal vaginal discharge; no abnormal vaginal bleeding; + vaginal dryness + decrease libido + chronic inc that remains the same    Musculoskeletal + myalgias; + arthralgias; + tingling remains the same  Psychiatric no depressed mood; no anxiety    Hematologic no tender lymph nodes; no noticeable swellings or lumps    Endocrine + hot flashes controlled; no heat/cold intolerance    Neurological no tremor; occasional numbness and tingling to ue and le; no headaches; + difficulty sleeping + change in memory    Answers for HPI/ROS submitted by the patient on 3/26/2018   General Symptoms: No  Skin Symptoms: Yes  HENT Symptoms: Yes  EYE SYMPTOMS: No  HEART SYMPTOMS: No  LUNG SYMPTOMS: No  INTESTINAL SYMPTOMS: Yes  URINARY SYMPTOMS: Yes  GYNECOLOGIC SYMPTOMS: Yes  BREAST SYMPTOMS: No  SKELETAL SYMPTOMS: Yes  BLOOD SYMPTOMS: No  NERVOUS SYSTEM SYMPTOMS: No  MENTAL HEALTH SYMPTOMS: No  Changes in hair: No  Changes in moles/birth marks: No  Itching: Yes  Rashes: No  Changes in nails: No  Acne: No  Hair in places you don't want it: No  Change in facial hair: No  Warts: No  Non-healing sores: No  Scarring: No  Flaking of skin: No  Color changes of hands/feet in cold : No  Sun sensitivity: No  Skin thickening: Yes  Ear pain: No  Ear discharge:  No  Hearing loss: No  Tinnitus: No  Nosebleeds: No  Congestion: No  Sinus pain: No  Trouble swallowing: No   Voice hoarseness: No  Mouth sores: No  Sore throat: Yes  Tooth pain: No  Gum tenderness: No  Bleeding gums: No  Change in taste: No  Change in sense of smell: No  Dry mouth: No  Hearing aid used: No  Neck lump: No  Heart burn or indigestion: No  Nausea: No  Vomiting: No  Abdominal pain: Yes  Bloating: Yes  Constipation: Yes  Diarrhea: Yes  Blood in stool: No  Black stools: No  Rectal or Anal pain: No  Fecal incontinence: No  Yellowing of skin or eyes: No  Vomit with blood: No  Change in stools: Yes  Trouble holding urine or incontinence: Yes  Pain or burning: No  Trouble starting or stopping: Yes  Increased frequency of urination: No  Blood in urine: No  Decreased frequency of urination: No  Frequent nighttime urination: Yes  Flank pain: No  Difficulty emptying bladder: Yes  Back pain: No  Muscle aches: No  Neck pain: Yes  Swollen joints: No  Joint pain: Yes  Bone pain: No  Muscle cramps: No  Muscle weakness: No  Joint stiffness: No  Bone fracture: No  Bleeding or spotting between periods: No  Heavy or painful periods: No  Irregular periods: No  Vaginal discharge: No  Hot flashes: No  Vaginal dryness: Yes  Genital ulcers: No  Reduced libido: Yes  Painful intercourse: No  Difficulty with sexual arousal: Yes  Post-menopausal bleeding: No    Past Medical History:    Past Medical History:   Diagnosis Date     Acute bronchospasm     one year but resolved - exercise induced.     Uterine polyp          Past Surgical History:    Past Surgical History:   Procedure Laterality Date     COLONOSCOPY  10/11/11     DILATION AND CURETTAGE, OPERATIVE HYSTEROSCOPY WITH MORCELLATOR, COMBINED  6/20/2013    Procedure: COMBINED DILATION AND CURETTAGE, OPERATIVE HYSTEROSCOPY WITH MORCELLATOR;  Dilation And Curettage, Operative Hysteroscopy With Myosure, Morcellation of endometrial polyps.;  Surgeon: Jaylene Jacobson MD;   "Location: [facility] OR     HC TOOTH EXTRACTION W/FORCEP       LAPAROSCOPIC HYSTERECTOMY TOTAL, BILATERAL SALPINGO-OOPHORECTOMY, NODE DISSECTION, COMBINED  7/1/2013    Procedure: COMBINED LAPAROSCOPIC HYSTERECTOMY TOTAL, SALPINGO-OOPHORECTOMY, NODE DISSECTION;  Total Laparoscopic Hysterectomy, Bilateral Salpingo Oophorectomy, Cystoscopy;  Surgeon: Jaelyn Khan MD;  Location: [facility] OR         Health Maintenance Due   Topic Date Due     ADVANCE DIRECTIVE PLANNING Q5 YRS  09/12/2017     MAMMO Q1 YR  04/17/2018       Current Medications:     Current Outpatient Prescriptions   Medication Sig Dispense Refill     Calcium Carbonate-Vitamin D (CALCIUM 500 + D PO) Take by mouth 2 times daily       Loperamide HCl (IMODIUM A-D PO) Take by mouth as needed Reported on 2/21/2017           Allergies:        Allergies   Allergen Reactions     Amoxicillin      rash        Social History:     Social History   Substance Use Topics     Smoking status: Never Smoker     Smokeless tobacco: Never Used     Alcohol use 0.0 oz/week     0 Standard drinks or equivalent per week      Comment: Rare, 1 per week       History   Drug Use No         Family History:     The patient's family history is notable     Family History   Problem Relation Age of Onset     C.A.D. Mother      stents at age 78, passed away 1/30/10     DIABETES Mother      Hypertension Mother      HEART DISEASE Mother      Uses heart medication for Tachycardia, has had stent placement 2 at once     Arthritis Mother      GASTROINTESTINAL DISEASE Mother      bowel obstruction - passed away age 86     Hypertension Father      Neurologic Disorder Daughter      astrocytoma     Connective Tissue Disorder Daughter      jra, in remission, melonoma     CANCER Daughter      melanoma     Other - See Comments Daughter      atypical ductal hyperplasia         Physical Exam:     /68  Pulse 67  Temp 98.1  F (36.7  C) (Oral)  Resp 16  Ht 1.676 m (5' 5.98\")  Wt 62.5 kg (137 lb 11.2 oz)  LMP " 09/15/2007  SpO2 99%  BMI 22.24 kg/m2  Body mass index is 22.24 kg/(m^2).    General Appearance: healthy and alert, no distress     HEENT:  no palpable nodules or masses        Cardiovascular: regular rate and rhythm, no gallops, rubs or murmurs     Respiratory: lungs clear, no rales, rhonchi or wheezes    Musculoskeletal: extremities non tender and without edema    Skin: no lesions or rashes     Neurological: normal gait, no gross defects     Psychiatric: appropriate mood and affect                               Hematological: normal cervical, supraclavicular and inguinal lymph nodes     Gastrointestinal:       abdomen soft, non-tender, non-distended    Genitourinary: External genitalia and urethral meatus appears normal. Patient has Right vulvar nevi which remains the same and she follows with dermatology. No other abnormal lesions noted. Vagina is smooth without nodularity or masses. Cervix surgically absent. Bimanual exam reveal no masses, nodularity or fullness. Recto-vaginal exam confirms these findings.      Assessment:      Leydi Zaragoza is a 62 year old woman with a diagnosis of stage IA grade 1 endometrioid adenocarcinoma s/p TLH, BSO on 7/1/13. She is here today for a surveillance visit.      Plan:       1.) Stage 1A Grade 1 Endometrioid Adenocarcinoma. Discussed patient increasing episodes of intermittent abd pain, constipation and diarrhea, recommend imaging for further evaluation. Patient prefers to keep a journal about her sx over the next 2-3 weeks and will call if sx persist and do not improve and will then proceed with imaging. Patient to RTC in 6 months for her next surveillance visit. Reviewed her remaining surveillance every 6 months for an additional 3 years until 7/2018. Reviewed signs and symptoms for when she should contact the clinic or seek additional care. Patient to contact the clinic with any questions or concerns in the interim.  2.) Vaginal Dryness. Reviewed use of lubricant for  daily use as needed; adequate amount of lubricant for intercourse, as well as changing positions for comfort.  3.) Labs and/or tests ordered include: None today.  4.) Health maintenance issues addressed today include follow up for annual health maintenance and non-gyn issues with PCP.      Patient reports understanding and will contact the clinic in 2-3 weeks if her sx do not improve and will complete CT.   Sera Tavera CNP  3/28/2018 2:16 PM    CC  Patient Care Team:  Mary Leonardo DO as PCP - General  Mary Rousseau PsyD as Psychologist (Psychology)

## 2018-03-27 NOTE — NURSING NOTE
"Oncology Rooming Note    March 27, 2018 9:40 AM   Leydi Zaragoza is a 62 year old female who presents for:    Chief Complaint   Patient presents with     Oncology Clinic Visit     6 Month F./U Endometrial Uterine ca     Initial Vitals: /68  Pulse 67  Temp 98.1  F (36.7  C) (Oral)  Resp 16  Ht 1.676 m (5' 5.98\")  Wt 62.5 kg (137 lb 11.2 oz)  LMP 09/15/2007  SpO2 99%  BMI 22.24 kg/m2 Estimated body mass index is 22.24 kg/(m^2) as calculated from the following:    Height as of this encounter: 1.676 m (5' 5.98\").    Weight as of this encounter: 62.5 kg (137 lb 11.2 oz). Body surface area is 1.71 meters squared.  No Pain (0) Comment: Data Unavailable   Patient's last menstrual period was 09/15/2007.  Allergies reviewed: Yes  Medications reviewed: Yes    Medications: Medication refills not needed today.  Pharmacy name entered into [facility] [employer]:    [id number] - [place], [place] - [address] [id number] - [place] - [address] [id number] - [place] - [address] AT  [address] [place] & [facility]    Clinical concerns: none Sera was NOT notified.    10 minutes for nursing intake (face to face time)     ROGER GUERRA LPN            "

## 2018-03-27 NOTE — MR AVS SNAPSHOT
After Visit Summary   3/27/2018    Leydi Zaragoza    MRN: [id number]           Patient Information     Date Of Birth          1955        Visit Information        Provider Department      3/27/2018 9:40 AM Sera Tavera APRN CNP [facility]        Today's Diagnoses     Endometrial/uterine adenocarcinoma (H)    -  1       Follow-ups after your visit        Follow-up notes from your care team     Return in about 6 months (around 9/27/2018).      Your next 10 appointments already scheduled     Sep 25, 2018  9:40 AM CDT   (Arrive by 9:25 AM)   Return Visit with CLOTILDE Ko CNP   [facility]    [address]  [address]  [facility] [address] [phone]              Who to contact     If you have questions or need follow up information about today's clinic visit or your schedule please contact [facility] directly at [phone].  Normal or non-critical lab and imaging results will be communicated to you by [employer], letter or phone within 4 business days after the clinic has received the results. If you do not hear from us within 7 days, please contact the clinic through [employer] or phone. If you have a critical or abnormal lab result, we will notify you by phone as soon as possible.  Submit refill requests through [employer] or call your pharmacy and they will forward the refill request to us. Please allow 3 business days for your refill to be completed.          Additional Information About Your Visit        [employer] Information     [employer] gives you secure access to your electronic health record. If you see a primary care provider, you can also send messages to your care team and make appointments. If you have questions, please call your primary care clinic.  If you do not have a primary care provider, please call [phone] and they will assist you.        Care EveryWhere ID   "   This is your Care EveryWhere ID. This could be used by other organizations to access your [facility] medical records  [id number]        Your Vitals Were     Pulse Temperature Respirations Height Last Period Pulse Oximetry    67 98.1  F (36.7  C) (Oral) 16 1.676 m (5' 5.98\") 09/15/2007 99%    BMI (Body Mass Index)                   22.24 kg/m2            Blood Pressure from Last 3 Encounters:   03/27/18 143/68   09/12/17 130/77   04/12/17 122/74    Weight from Last 3 Encounters:   03/27/18 62.5 kg (137 lb 11.2 oz)   09/12/17 60.8 kg (134 lb)   04/12/17 61.8 kg (136 lb 3.2 oz)              Today, you had the following     No orders found for display         Today's Medication Changes          These changes are accurate as of 3/27/18 11:59 PM.  If you have any questions, ask your nurse or doctor.               Stop taking these medicines if you haven't already. Please contact your care team if you have questions.     glutamine 500 MG Tabs   Stopped by:  Sera Taevra APRN CNP           MAGNESIUM CARBONATE PO   Stopped by:  Sera Tavera APRN CNP           VITAMIN-B COMPLEX PO   Stopped by:  Sera Tavera APRN CNP                    Primary Care Provider Office Phone # Fax #    Mary MARC DO Mal[place] [phone] [address] [address]        Equal Access to Services     QUINCY HAMLIN : Hadii jamaal cabao Sovilla, waaxda luqadaha, qaybta kaalmada socorro, raiza cerna. So [facility] [phone].    ATENCIÓN: Si habla español, tiene a youssef disposición servicios gratuitos de asistencia lingüística. Llame al [phone].    We comply with applicable federal civil rights laws and [place] laws. We do not discriminate on the basis of race, color, national origin, age, disability, sex, sexual orientation, or gender identity.            Thank you!     Thank you for choosing [facility]  for your care. Our goal is always to " provide you with excellent care. Hearing back from our patients is one way we can continue to improve our services. Please take a few minutes to complete the written survey that you may receive in the mail after your visit with us. Thank you!             Your Updated Medication List - Protect others around you: Learn how to safely use, store and throw away your medicines at www.disposemymeds.org.          This list is accurate as of 3/27/18 11:59 PM.  Always use your most recent med list.                   Brand Name Dispense Instructions for use Diagnosis    CALCIUM 500 + D PO      Take by mouth 2 times daily        IMODIUM A-D PO      Take by mouth as needed Reported on 2/21/2017

## 2018-04-18 ENCOUNTER — RADIANT APPOINTMENT (OUTPATIENT)
Dept: CT IMAGING | Facility: CLINIC | Age: 63
End: 2018-04-18
Attending: NURSE PRACTITIONER
Payer: COMMERCIAL

## 2018-04-18 DIAGNOSIS — C54.1 ENDOMETRIAL/UTERINE ADENOCARCINOMA (H): Primary | ICD-10-CM

## 2018-04-18 DIAGNOSIS — C54.1 ENDOMETRIAL/UTERINE ADENOCARCINOMA (H): ICD-10-CM

## 2018-04-18 LAB
RADIOLOGIST FLAGS: NORMAL
RADIOLOGIST FLAGS: NORMAL

## 2018-04-18 RX ORDER — IOPAMIDOL 755 MG/ML
84 INJECTION, SOLUTION INTRAVASCULAR ONCE
Status: COMPLETED | OUTPATIENT
Start: 2018-04-18 | End: 2018-04-18

## 2018-04-18 RX ADMIN — IOPAMIDOL 84 ML: 755 INJECTION, SOLUTION INTRAVASCULAR at 15:01

## 2018-04-18 NOTE — DISCHARGE INSTRUCTIONS

## 2018-04-18 NOTE — PROGRESS NOTES
Patient called to report she has been keeping track of abd pain and has continued to have intermittent abd pain since her last appt. Does not seem consistent. Recommended further evaluation with CT scan. Patient agrees. Discussed clinic will call to schedule with patient and I will call her with the results.   Sera Tavera CNP  4/18/2018 9:50 AM

## 2018-04-19 DIAGNOSIS — E04.1 THYROID NODULE: Primary | ICD-10-CM

## 2018-04-19 DIAGNOSIS — R91.8 PULMONARY NODULES: ICD-10-CM

## 2018-04-19 NOTE — PROGRESS NOTES
CT scan called and discussed with patient. Recommend US with possible biopsy of thyroid within a few weeks. Repeat CT chest to follow up with pulmonary nodules. Follow up with GI.  Sera Tavera CNP  4/19/2018 10:39 AM

## 2018-04-24 ENCOUNTER — RADIANT APPOINTMENT (OUTPATIENT)
Dept: ULTRASOUND IMAGING | Facility: CLINIC | Age: 63
End: 2018-04-24
Attending: NURSE PRACTITIONER
Payer: COMMERCIAL

## 2018-04-24 DIAGNOSIS — E04.1 THYROID NODULE: ICD-10-CM

## 2018-04-24 RX ORDER — LIDOCAINE HYDROCHLORIDE 10 MG/ML
5 INJECTION, SOLUTION INFILTRATION; PERINEURAL ONCE
Status: COMPLETED | OUTPATIENT
Start: 2018-04-24 | End: 2018-04-24

## 2018-04-24 RX ADMIN — LIDOCAINE HYDROCHLORIDE 3 ML: 10 INJECTION, SOLUTION INFILTRATION; PERINEURAL at 11:44

## 2018-04-24 NOTE — MR AVS SNAPSHOT
MRN:[id number]                      After Visit Summary   4/24/2018    Leydi Zaragoza    MRN: [id number]           Visit Information        Provider Department      4/24/2018 11:00 AM UC PROCEDURAL RAD;  IMAGING NURSE; UCUS3 [facility] US           Review of your medicines          These changes are accurate as of 4/24/18 10:25 AM.  If you have any questions, ask your nurse or doctor.               CONTINUE these medicines which have NOT CHANGED        Dose / Directions    CALCIUM 500 + D PO        Take by mouth 2 times daily   Refills:  0       IMODIUM A-D PO        Take by mouth as needed Reported on 2/21/2017   Refills:  0                Protect others around you: Learn how to safely use, store and throw away your medicines at www.disposemymeds.org.         Follow-ups after your visit        Your next 10 appointments already scheduled     Apr 24, 2018 11:00 AM CDT   (Arrive by 10:45 AM)   US BIOPSY THYROID FINE NEEDLE ASPIRATION with UCUS3,  IMAGING NURSE,  PROCEDURAL RAD   [facility] US ([facility] [place])    [address] [place] [address] [phone]           Bring a list of your medicines to the exam. Include vitamins, minerals and over-the-counter drugs.  Tell your doctor in advance:   If you are or may be pregnant.   If you are taking Coumadin (or any other blood thinners) 5 days prior to the exam for any special instructions.  IF YOUR DOCTOR HAS TOLD YOU THAT YOU WILL BE RECEIVING SEDATION (medicine to help you relax): (Typically sedation is only for liver exams at [facility] and Renal Biopsy exams in Pediatrics)   See your family doctor for an exam within 30 days of treatment.   Plan for an adult to drive you home and stay with you for at least 24 hours.   No eating or drinking for 4 hours before your test. You may take medicine with small sips of water.   If you have diabetes:If you take  insulin, call your diabetes care team for any special instructions for this exam.  Please call the Imaging Department at your exam site with any questions.             Aug 17, 2018  8:40 AM CDT   (Arrive by 8:25 AM)   New Patient Visit with Bj Parish   [facility] ([facility] [place])    [address]  4th Floor  [facility] [address] [phone]            Sep 13, 2018 10:00 AM CDT   (Arrive by 9:45 AM)   CT CHEST W CONTRAST with UCCT1   [facility] ([facility] [place])    [address]  1st Floor  [facility] [id number] [phone]           Please bring any scans or X-rays taken at other hospitals, if similar tests were done. Also bring a list of your medicines, including vitamins, minerals and over-the-counter drugs. It is safest to leave personal items at home.  Be sure to tell your doctor:   If you have any allergies.   If there s any chance you are pregnant.   If you are breastfeeding.    If you have diabetes as your medication may need to be adjusted for this exam.  You will have contrast for this exam. To prepare:   Do not eat or drink for 2 hours before your exam. If you need to take medicine, you may take it with small sips of water. (We may ask you to take liquid medicine as well.)   The day before your exam, drink extra fluids at least six 8-ounce glasses (unless your doctor tells you to restrict your fluids).  Patients over 70 or patients with diabetes or kidney problems:   If you haven t had a blood test (creatinine test) within the last 30 days, the Cardiologist/Radiologist may require you to get this test prior to your exam.  Please wear loose clothing, such as a sweat suit or jogging clothes. Avoid snaps, zippers and other metal. We may ask you to undress and put on a hospital gown.  If you have any questions, please call the Imaging Department where you will have your exam.            Sep  25, 2018  9:40 AM CDT   (Arrive by 9:25 AM)   Return Visit with CLOTILDE Ko [facility] ([facility] [place])    [address]  [address]  [facility] [address] [phone]               Care Instructions        Further instructions from your care team       Directions for after your Thyroid Fine Needle Aspiration:    You can remove your bandage within a few hours.  The site of the biopsy may be sore for a day or two after the procedure. You can take over-the-counter pain medicine if needed.  Notify your doctor if you have any of the following:    Fever above 101 degrees    Swelling in the area of the biopsy    Redness or leaking from the biopsy site  Your doctor will notify you of the results in 2-3 days.     Additional Information About Your Visit        [employer] Information     [employer] gives you secure access to your electronic health record. If you see a primary care provider, you can also send messages to your care team and make appointments. If you have questions, please call your primary care clinic.  If you do not have a primary care provider, please call [phone] and they will assist you.      Mobile[employer] is an electronic gateway that provides easy, online access to your medical records. With [employer], you can request a clinic appointment, read your test results, renew a prescription or communicate with your care team.     To access your existing account, please contact your [facility] or call [phone] for assistance.        Care EveryWhere ID     This is your Care EveryWhere ID. This could be used by other organizations to access your [facility] medical records  [id number]        Your Vitals Were     Last Period                   09/15/2007            Primary Care Provider Office Phone # Fax #    Mary TARI Leonardo [phone]      Equal Access to Services     GONZÁLEZ HAMLIN AH: Alex ward  Vesna, manav tatebrieha, sindy kamigue quintanilla, raiza anniein hayaaene kumariemir maria isabelaby laAndreinamikaela eryn. So [facility] [phone].    ATENCIÓN: Si habla henryañol, tiene a youssef disposición servicios gratuitos de asistencia lingüística. Nell al [phone].    We comply with applicable federal civil rights laws and [place] laws. We do not discriminate on the basis of race, color, national origin, age, disability, sex, sexual orientation, or gender identity.            Thank you!     Thank you for choosing [facility] for your care. Our goal is always to provide you with excellent care. Hearing back from our patients is one way we can continue to improve our services. Please take a few minutes to complete the written survey that you may receive in the mail after you visit with us. Thank you!             Medication List: This is a list of all your medications and when to take them. Check marks below indicate your daily home schedule. Keep this list as a reference.          This list is accurate as of 4/24/18 10:25 AM.  Always use your most recent med list.               Medications           Morning Afternoon Evening Bedtime As Needed    CALCIUM 500 + D PO   Take by mouth 2 times daily                                IMODIUM A-D PO   Take by mouth as needed Reported on 2/21/2017

## 2018-04-26 LAB — COPATH REPORT: NORMAL

## 2018-05-16 ENCOUNTER — OFFICE VISIT (OUTPATIENT)
Dept: FAMILY MEDICINE | Facility: CLINIC | Age: 63
End: 2018-05-16
Payer: COMMERCIAL

## 2018-05-16 VITALS
SYSTOLIC BLOOD PRESSURE: 127 MMHG | OXYGEN SATURATION: 96 % | DIASTOLIC BLOOD PRESSURE: 78 MMHG | WEIGHT: 133.9 LBS | BODY MASS INDEX: 21.52 KG/M2 | HEART RATE: 65 BPM | HEIGHT: 66 IN | TEMPERATURE: 98.5 F

## 2018-05-16 DIAGNOSIS — M89.319 CLAVICLE ENLARGEMENT: Primary | ICD-10-CM

## 2018-05-16 DIAGNOSIS — R91.1 PULMONARY NODULE: ICD-10-CM

## 2018-05-16 DIAGNOSIS — E04.1 THYROID NODULE: ICD-10-CM

## 2018-05-16 LAB — T3FREE SERPL-MCNC: 2.8 PG/ML (ref 2.3–4.2)

## 2018-05-16 PROCEDURE — 99000 SPECIMEN HANDLING OFFICE-LAB: CPT | Performed by: FAMILY MEDICINE

## 2018-05-16 PROCEDURE — 83018 HEAVY METAL QUAN EACH NES: CPT | Mod: 90 | Performed by: FAMILY MEDICINE

## 2018-05-16 PROCEDURE — 36415 COLL VENOUS BLD VENIPUNCTURE: CPT | Performed by: FAMILY MEDICINE

## 2018-05-16 PROCEDURE — 84443 ASSAY THYROID STIM HORMONE: CPT | Performed by: FAMILY MEDICINE

## 2018-05-16 PROCEDURE — 99214 OFFICE O/P EST MOD 30 MIN: CPT | Performed by: FAMILY MEDICINE

## 2018-05-16 PROCEDURE — 86376 MICROSOMAL ANTIBODY EACH: CPT | Performed by: FAMILY MEDICINE

## 2018-05-16 PROCEDURE — 84481 FREE ASSAY (FT-3): CPT | Performed by: FAMILY MEDICINE

## 2018-05-16 PROCEDURE — 84439 ASSAY OF FREE THYROXINE: CPT | Performed by: FAMILY MEDICINE

## 2018-05-16 NOTE — PROGRESS NOTES
SUBJECTIVE:   Leydi Zaragoza is a 62 year old female who presents to clinic today for the following health issues:      Pt here c/o issue with collar bone, some pain on R frontal side of collar bone, unsure if any specific injury.  Noticed in February.  Patient states she developed right pain at the clavicle sternal junction in February approximately 4 months ago.  The pain comes and goes.  It seems to be aggravated by certain movements bending and lifting and using her arm.  She does note some aggravation shoveling as well as chopping ice.  She denies any specific injury or fall.  She does not recall any previous symptoms.  She does play tennis  And is right-handed.    Patient is here today to follow-up on her recent CT scan patient recently sent her  Oncologist and had a CT of the chest abdomen and pelvis to evaluate for some abdominal symptoms.  Incidentally noted she does have a millimeter left upper lobe pulmonary nodule.  It was too small to identify but recommend a six-month follow-up which she has plans to do.  Patient wonders if she needs a mammogram because she had a CT and I said yes encouraged her to schedule that.  Also noted during that test was a thyroid nodule.  Patient did have a thyroid ultrasound and a fine-needle aspiration that showed benign tissue.  She is scheduled to see GI.  -------------------------------------    Problem list and histories reviewed & adjusted, as indicated.  Additional history: as documented    Patient Active Problem List   Diagnosis     Mucous polyp of cervix     CARDIOVASCULAR SCREENING; LDL GOAL LESS THAN 160     Advanced directives, counseling/discussion     Adenomatous colon polyp     Dysplastic nevus     Endometrial/uterine adenocarcinoma (H)     Thyroid nodule     Pulmonary nodule     Past Surgical History:   Procedure Laterality Date     COLONOSCOPY  10/11/11     DILATION AND CURETTAGE, OPERATIVE HYSTEROSCOPY WITH MORCELLATOR, COMBINED  6/20/2013    Procedure:  "COMBINED DILATION AND CURETTAGE, OPERATIVE HYSTEROSCOPY WITH MORCELLATOR;  Dilation And Curettage, Operative Hysteroscopy With Myosure, Morcellation of endometrial polyps.;  Surgeon: Jaylene Jacobson MD;  Location: [facility] OR     HC TOOTH EXTRACTION W/FORCEP       LAPAROSCOPIC HYSTERECTOMY TOTAL, BILATERAL SALPINGO-OOPHORECTOMY, NODE DISSECTION, COMBINED  7/1/2013    Procedure: COMBINED LAPAROSCOPIC HYSTERECTOMY TOTAL, SALPINGO-OOPHORECTOMY, NODE DISSECTION;  Total Laparoscopic Hysterectomy, Bilateral Salpingo Oophorectomy, Cystoscopy;  Surgeon: aJelyn Khan MD;  Location: [facility] OR       Social History   Substance Use Topics     Smoking status: Never Smoker     Smokeless tobacco: Never Used     Alcohol use 0.0 oz/week     0 Standard drinks or equivalent per week      Comment: Rare, 1 per week     Family History   Problem Relation Age of Onset     C.A.D. Mother      stents at age 78, passed away 1/30/10     DIABETES Mother      Hypertension Mother      HEART DISEASE Mother      Uses heart medication for Tachycardia, has had stent placement 2 at once     Arthritis Mother      GASTROINTESTINAL DISEASE Mother      bowel obstruction - passed away age 86     Hypertension Father      Neurologic Disorder Daughter      astrocytoma     Connective Tissue Disorder Daughter      jra, in remission, melonoma     CANCER Daughter      melanoma     Other - See Comments Daughter      atypical ductal hyperplasia           Reviewed and updated as needed this visit by clinical staff  Tobacco  Allergies  Meds  Problems       Reviewed and updated as needed this visit by Provider  Allergies  Meds  Problems         ROS:  Constitutional, HEENT, cardiovascular, pulmonary, GI, , musculoskeletal, neuro, skin, endocrine and psych systems are negative, except as otherwise noted.    OBJECTIVE:     /78  Pulse 65  Temp 98.5  F (36.9  C) (Oral)  Ht 5' 5.5\" (1.664 m)  Wt 133 lb 14.4 oz (60.7 kg)  LMP 09/15/2007  SpO2 96%  BMI " 21.94 kg/m2  Body mass index is 21.94 kg/(m^2).  GENERAL: healthy, alert and no distress  NECK: thyroid nodule right side  On the right clavicle at the sternum she has some swelling and mild tenderness    Diagnostic Test Results:    Reviewed CT in EPIC    ASSESSMENT/PLAN:     1. Clavicle enlargement  Patient's right side of the clavicle at the sternum is visibly swollen compared to the left side.  Initially I recommended a chest x-ray however she recently did have a CT scan of the chest and I was able to review it.  I also called the radiologist at the [facility] to discuss and it looks like there was some fluid in that joint space but no bone abnormalities noted.  Will hold off on any x-ray at this time but consider if this gets larger or any new symptoms develop.    2. Thyroid nodule  Patient recently had a thyroid nodule biopsied that was benign.  Plan to check some thyroid labs today.  - TSH  - T4, free  - T3, Free  - Thyroid peroxidase antibody  - Iodine Serum    3. Pulmonary nodule  Patient has a left upper lobe pulmonary nodule proximally 7-8 mm that requires six-month follow-up.      Pt will call or RTC if symptoms worsen or do not improve.      Mary Leonardo, DO  [facility]

## 2018-05-16 NOTE — NURSING NOTE
"Chief Complaint   Patient presents with     collar bone     one side more prominent than the other     /78  Pulse 65  Temp 98.5  F (36.9  C) (Oral)  Ht 5' 5.5\" (1.664 m)  Wt 133 lb 14.4 oz (60.7 kg)  LMP 09/15/2007  SpO2 96%  BMI 21.94 kg/m2 Estimated body mass index is 21.94 kg/(m^2) as calculated from the following:    Height as of this encounter: 5' 5.5\" (1.664 m).    Weight as of this encounter: 133 lb 14.4 oz (60.7 kg).        Health Maintenance due pending provider review:  Mammogram    Will discuss with PN, did have CT done    Munira Dean CMA  "

## 2018-05-16 NOTE — MR AVS SNAPSHOT
After Visit Summary   5/16/2018    Leydi Zaragoza    MRN: [id number]           Patient Information     Date Of Birth          1955        Visit Information        Provider Department      5/16/2018 8:00 AM Mary Leonardo DO [facility]        Today's Diagnoses     Clavicle enlargement    -  1    Thyroid nodule        Pulmonary nodule           Follow-ups after your visit        Your next 10 appointments already scheduled     Aug 17, 2018  8:40 AM CDT   (Arrive by 8:25 AM)   New Patient Visit with Bj Parish   [facility] ([facility] [place])    [address]  4th Floor  [facility] [id number] [phone]            Sep 13, 2018 10:00 AM CDT   (Arrive by 9:45 AM)   CT CHEST W CONTRAST with UCCT1   [facility] [place] [facility] ([facility] [place])    [address]  1st Floor  [facility] [id number] [phone]           Please bring any scans or X-rays taken at other hospitals, if similar tests were done. Also bring a list of your medicines, including vitamins, minerals and over-the-counter drugs. It is safest to leave personal items at home.  Be sure to tell your doctor:   If you have any allergies.   If there s any chance you are pregnant.   If you are breastfeeding.    If you have diabetes as your medication may need to be adjusted for this exam.  You will have contrast for this exam. To prepare:   Do not eat or drink for 2 hours before your exam. If you need to take medicine, you may take it with small sips of water. (We may ask you to take liquid medicine as well.)   The day before your exam, drink extra fluids at least six 8-ounce glasses (unless your doctor tells you to restrict your fluids).  Patients over 70 or patients with diabetes or kidney problems:   If you haven t had a blood test (creatinine test) within the last 30 days, the Cardiologist/Radiologist may require you to get this  test prior to your exam.  Please wear loose clothing, such as a sweat suit or jogging clothes. Avoid snaps, zippers and other metal. We may ask you to undress and put on a hospital gown.  If you have any questions, please call the Imaging Department where you will have your exam.            Sep 25, 2018  9:40 AM CDT   (Arrive by 9:25 AM)   Return Visit with CLOTILDE Ko [facility] ([facility] [place])    [address]  [address]  [facility] [address] [phone]              Who to contact     If you have questions or need follow up information about today's clinic visit or your schedule please contact [facility] directly at [phone].  Normal or non-critical lab and imaging results will be communicated to you by [employer], letter or phone within 4 business days after the clinic has received the results. If you do not hear from us within 7 days, please contact the clinic through [employer] or phone. If you have a critical or abnormal lab result, we will notify you by phone as soon as possible.  Submit refill requests through [employer] or call your pharmacy and they will forward the refill request to us. Please allow 3 business days for your refill to be completed.          Additional Information About Your Visit        [employer] Information     [employer] gives you secure access to your electronic health record. If you see a primary care provider, you can also send messages to your care team and make appointments. If you have questions, please call your primary care clinic.  If you do not have a primary care provider, please call [phone] and they will assist you.        Care EveryWhere ID     This is your Care EveryWhere ID. This could be used by other organizations to access your [place] medical records  [id number]        Your Vitals Were     Pulse Temperature Height Last Period Pulse Oximetry BMI (Body Mass Index)    65 98.5  F (36.9  " C) (Oral) 5' 5.5\" (1.664 m) 09/15/2007 96% 21.94 kg/m2       Blood Pressure from Last 3 Encounters:   05/16/18 127/78   03/27/18 143/68   09/12/17 130/77    Weight from Last 3 Encounters:   05/16/18 133 lb 14.4 oz (60.7 kg)   03/27/18 137 lb 11.2 oz (62.5 kg)   09/12/17 134 lb (60.8 kg)              We Performed the Following     Iodine Serum     T3, Free     T4, free     Thyroid peroxidase antibody     TSH        Primary Care Provider Office Phone # Fax #    Mary MARC Malissa, [phone] [address] [address]        Equal Access to Services     GONZÁLEZ HAMLIN : Hadii jamaal cabao Sovilla, waaxda luqadaha, qaybta kaalmada adeemiryajose, raiza sands . So [facility] [place] [phone].    ATENCIÓN: Si habla español, tiene a youssef disposición servicios gratuitos de asistencia lingüística. Llame al [phone].    We comply with applicable federal civil rights laws and [place] laws. We do not discriminate on the basis of race, color, national origin, age, disability, sex, sexual orientation, or gender identity.            Thank you!     Thank you for choosing [facility]  for your care. Our goal is always to provide you with excellent care. Hearing back from our patients is one way we can continue to improve our services. Please take a few minutes to complete the written survey that you may receive in the mail after your visit with us. Thank you!             Your Updated Medication List - Protect others around you: Learn how to safely use, store and throw away your medicines at www.disposemymeds.org.          This list is accurate as of 5/16/18  8:36 AM.  Always use your most recent med list.                   Brand Name Dispense Instructions for use Diagnosis    CALCIUM 500 + D PO      Take by mouth 2 times daily        IMODIUM A-D PO      Take by mouth as needed Reported on 2/21/2017          "

## 2018-05-17 LAB
IODINE SERPL-MCNC: 64 NG/ML (ref 40–92)
T4 FREE SERPL-MCNC: 0.94 NG/DL (ref 0.76–1.46)
THYROPEROXIDASE AB SERPL-ACNC: <10 IU/ML
TSH SERPL DL<=0.005 MIU/L-ACNC: 1.2 MU/L (ref 0.4–4)

## 2018-05-30 ENCOUNTER — TRANSFERRED RECORDS (OUTPATIENT)
Dept: HEALTH INFORMATION MANAGEMENT | Facility: CLINIC | Age: 63
End: 2018-05-30

## 2018-05-30 ENCOUNTER — MYC MEDICAL ADVICE (OUTPATIENT)
Dept: ONCOLOGY | Facility: CLINIC | Age: 63
End: 2018-05-30

## 2018-06-23 ENCOUNTER — HEALTH MAINTENANCE LETTER (OUTPATIENT)
Age: 63
End: 2018-06-23

## 2018-07-13 ENCOUNTER — TRANSFERRED RECORDS (OUTPATIENT)
Dept: HEALTH INFORMATION MANAGEMENT | Facility: CLINIC | Age: 63
End: 2018-07-13

## 2018-08-22 ENCOUNTER — TRANSFERRED RECORDS (OUTPATIENT)
Dept: HEALTH INFORMATION MANAGEMENT | Facility: CLINIC | Age: 63
End: 2018-08-22

## 2018-10-02 ENCOUNTER — RADIANT APPOINTMENT (OUTPATIENT)
Dept: CT IMAGING | Facility: CLINIC | Age: 63
End: 2018-10-02
Attending: NURSE PRACTITIONER
Payer: COMMERCIAL

## 2018-10-02 DIAGNOSIS — R91.8 PULMONARY NODULES: ICD-10-CM

## 2018-10-02 ASSESSMENT — ENCOUNTER SYMPTOMS
MUSCLE WEAKNESS: 0
BACK PAIN: 0
MYALGIAS: 0
FLANK PAIN: 0
VOMITING: 0
STIFFNESS: 1
DECREASED LIBIDO: 1
HEMATURIA: 0
CONSTIPATION: 1
HEARTBURN: 0
ABDOMINAL PAIN: 1
JOINT SWELLING: 1
ARTHRALGIAS: 1
BLOOD IN STOOL: 0
NAUSEA: 0
DIARRHEA: 1
RECTAL PAIN: 0
JAUNDICE: 0
DYSURIA: 0
NECK PAIN: 0
BOWEL INCONTINENCE: 0
HOT FLASHES: 0
DIFFICULTY URINATING: 0
BLOATING: 0
MUSCLE CRAMPS: 0

## 2018-10-08 NOTE — PROGRESS NOTES
Follow Up Notes on Referred Patient    Date: 10/9/2018     RE: Leydi Zaragoza  : 1955  BARB: 10/9/2018      Leydi Zaragoza is a 62 year old woman with a diagnosis of stage 1A grade 1 endometrioid adenocarcinoma s/p TLH, BSO on 13.     She is here today for a surveillance visit. Patient notes that she has been doing well. Has had a little bit of a congested cough over the past couple of days. No fevers or SOB. She has chronic constipation/diarrhea secondary to her possible microscopic colitis and follows with GI. She is due for colonoscopy. Notes some upper abd pain that feels muscular in nature and has improved. She has approx 2 episodes of severe LLQ abd pain with abd bloating per year that is relieved with lying on her left side. She follows with her GI provider regarding this and is instructed to go to the ED to get an xray. She has not had an episode for some time. Notes chronic urinary inc that improves with kegal exercises. Denies signes of UTI. Continues to experience vaginal dryness and low libido that she is not concerned about.  Denies vulvar or perineal irritation. She has chronic difficulty sleeping, urinary inc, vaginal dryness, and generalized arthralgias and myalgias. She had her mammo today and results are pending. She will follow up with GI for colonoscopy. She follows with her PCP for comorbid conditions and routine health screenings.    Cancer History:    The patient presented for evaluation of an episode of postmenopausal bleeding 13. Also c/o RUQ pain. No cramping. Pelvic ultrasound done on 13 normal uterus and ovaries, endometrial cavity containing three masses measuring 2.1 x 2.1 x 1.2 cm, 1.2 x 1.3 x 0.8 cm, 0.8 x 1.0 x 0.7 cm.    13: Dilatation and curettage, operative hysteroscopy with MyoSure morcellation of endometrial polyps with Jaylene Jacobson MD    FINAL DIAGNOSIS:  Uterus, endometrium, polyp:  - Complex atypical hyperplasia.    Uterus,  endometrium, polypectomy:  - Adenocarcinoma arising in complex atypical hyperplasia. Fragmented, but markedly atypical endometrium. There is marked cytologic atypia. In some areas, branching glandular forms are closely opposed. There is mild squamoid morular metaplasia. Some features are worrisome for vicente carcinoma; however, the biopsy is quite fragmented. Separately submitted fragments labeled morcellated polyp shows similar features with more confluent areas, thus meeting criteria for adenocarcinoma .   7/1/13: Exam under anesthesia, total laparoscopic hysterectomy, bilateral salpingo-oophorectomy, pelvic washings, cystoscopy  Pathologic Staging: pT1a pNX.  Primary Tumor (pT): pT1a [1A]: Tumor invades less than one-half of the myometrium.  Regional lymph nodes (pN): pNX: Cannot be assessed  No nodes submitted or found  Distant Metastasis (pM): Not applicable.  7/17/13: Post op visit. Pathology reviewed in detail with the patient and copy provided. In light of the grade 1 and limited depth of invasion < 30%, no addition adjuvant therapy is recommended at this time. Potential risk of recurrence reviewed and sign for possible recurrence reviewed. Plan for scheduled tumor surveillance every 3 months for the first 2 years and them every 6 months for an addition 3 years. Patient is encouraged to continue follow up for routine cancer surveillance including breast and colon cancer.  10/24/13: Pap NIL.      10/30/14: Surveillance visit. Feeling well. She denies any vaginal bleeding, no new changes in her bowel (has seen her PCP for diarrhea and is being worked up) or bladder habits (has scant amount of urine loss in the morning), no nausea/emesis, no lower extremity edema, and no difficulties eating or sleeping. She denies any abdominal discomfort/bloating, no fevers or chills, and no chest pain or shortness of breath. She states her health screenings are current, she is sexually active and using a lubricant, and reports  "occassional dryness for which she uses coconut oil with good results.       02/11/15: Survelliance visit. She reports feeling well. Denies any vaginal bleeding, abd pain/discomfort, or abd bloating/cramping. No new changes in her bowel (has seen her PCP for diarrhea and is being worked up), she takes benafiber and has had regular bm. No change in bladder habits, no nausea/emesis, no lower extremity edema, and no difficulties eating or sleeping. No fevers or chills, and no chest pain or shortness of breath. She states her health screenings are current except she is due for mammogram, she is sexually active and using a lubricant, and reports occassional dryness for which she uses coconut oil with good results.       4/21/15: Patient called the clinic and spoke with clinic staff: Assessment: Pt of Dr. Khan with HX of grade 1 stage IA endometrioid adenocarcinoma s/p TLH, BSO on 7/1/13 . Is scheduled to have her 3 month check on 5/12. However would like a sooner appointment. Pt has discovered a lump between her vagina and rectum which is irritated and when touched has a pin prick sensation. Difficult for pt to assess. Two friends who were diagnosed with cancer at the same time she was have within the last month been diagnosed with metastatic cancer, \"they are goners\" Pt is very concerned. Plan: Suggested pt see her PCP now but does not want to do that. Sooner appointment given on 4/28 at 10:20 AM      4/27/15: Follow up of lump between her vagina and rectum which is irritated when touched that has a pin prick sensation. She notes RLQ discomfort stabbing sensation off and on. 3 month follow up.  7/28/15: 3 month follow up  02/9/16: 6 month follow up   8/9/16: 6 month follow up   2/21/17: 6 month follow up   9/12/17: 6 month follow up   3/26/18: 6 month follow up  04/18/18: CT CAP IMPRESSION:  1. Indeterminate 7 mm left upper lobe pulmonary nodule. Metastatic disease or primary lung malignancy cannot be excluded. It is " likely too small to be evaluated by PET/CT. Follow-up CT in 6 months to establish stability. 2. Otherwise no evidence of metastatic disease in the chest, abdomen or pelvis. 3. 1.8 cm right lobe thyroid nodule. Recommend evaluation with ultrasound. 4. Small fat density structure within the duodenum, favored to represent ingested material or a lipoma. [Recommend Follow Up: Lung nodule]  4/24/18: US Thyroid IMPRESSION: Uncomplicated fine-needle aspiration of the partially calcific right thyroid nodule. Thyroid, right, inferior lobe, nodule, ultrasound guided fine needle   aspiration:Benign -Consistent with a benign nodule (includes adenomatoid nodule, colloid nodule, etc.) Specimen Adequacy: Satisfactory for evaluation.   10/2/18: CT chest IMPRESSION: 1. Previously seen partially calcified pulmonary nodule in the posterior left upper lobe is grossly unchanged measuring 8mm since 04/18/2018 and favors a benign etiology. Consider optional follow-up CT at 18-24 months. 2. Scattered distal mucoid impaction especially in the right upper lobe.         Review of Systems:      Systemic no weight changes; no fever; no chills; occasional night sweats; no appetite changes + difficulty sleeping  Skin no new rashes, or lesions.  Eye no irritation; no changes in vision  Magali-Laryngeal no dysphagia; no hoarseness + congested cough  Pulmonary no cough; no shortness of breath  Cardiovascular no chest pain; no palpitations  Gastrointestinal + intermittent diarrhea; + intermittent constipation; + intermittent upper abdominal pain; no changes in bowel habits; no blood in stool.  Genitourinary no urinary frequency; no urinary urgency; no dysuria; no pain; no abnormal vaginal discharge; no abnormal vaginal bleeding; + vaginal dryness + decrease libido + chronic inc that remains the same    Musculoskeletal + myalgias; + arthralgias; + tingling remains the same  Psychiatric no depressed mood; no anxiety    Hematologic no tender lymph nodes; no  noticeable swellings or lumps    Endocrine + hot flashes controlled; no heat/cold intolerance    Neurological no tremor; occasional numbness and tingling to ue and le; no headaches; + difficulty sleeping     Answers for HPI/ROS submitted by the patient on 10/2/2018   General Symptoms: No  Skin Symptoms: No  HENT Symptoms: No  EYE SYMPTOMS: No  HEART SYMPTOMS: No  LUNG SYMPTOMS: No  INTESTINAL SYMPTOMS: Yes  URINARY SYMPTOMS: Yes  GYNECOLOGIC SYMPTOMS: Yes  BREAST SYMPTOMS: No  SKELETAL SYMPTOMS: Yes  BLOOD SYMPTOMS: No  NERVOUS SYSTEM SYMPTOMS: No  MENTAL HEALTH SYMPTOMS: No  Heart burn or indigestion: No  Nausea: No  Vomiting: No  Abdominal pain: Yes - pull muscles improved  Bloating: No  Constipation: Yes - normal  Diarrhea: Yes - normal  Blood in stool: No  Black stools: No  Rectal or Anal pain: No  Fecal incontinence: No  Yellowing of skin or eyes: No  Vomit with blood: No  Change in stools: Yes - typical  Trouble holding urine or incontinence: Yes - same  Pain or burning: No  Trouble starting or stopping: Yes - same  Increased frequency of urination: No  Blood in urine: No  Decreased frequency of urination: No  Frequent nighttime urination: Yes - same  Flank pain: No  Difficulty emptying bladder: No  Back pain: No  Muscle aches: No  Neck pain: No  Swollen joints: Yes  Joint pain: Yes  Bone pain: No  Muscle cramps: No  Muscle weakness: No  Joint stiffness: Yes  Bone fracture: No  Bleeding or spotting between periods: No  Heavy or painful periods: No  Irregular periods: No  Vaginal discharge: No  Hot flashes: No  Vaginal dryness: Yes   Genital ulcers: No  Reduced libido: Yes  Painful intercourse: No  Difficulty with sexual arousal: Yes      Past Medical History:    Past Medical History:   Diagnosis Date     Acute bronchospasm     one year but resolved - exercise induced.     Uterine polyp          Past Surgical History:    Past Surgical History:   Procedure Laterality Date     COLONOSCOPY  10/11/11     DILATION  AND CURETTAGE, OPERATIVE HYSTEROSCOPY WITH MORCELLATOR, COMBINED  6/20/2013    Procedure: COMBINED DILATION AND CURETTAGE, OPERATIVE HYSTEROSCOPY WITH MORCELLATOR;  Dilation And Curettage, Operative Hysteroscopy With Myosure, Morcellation of endometrial polyps.;  Surgeon: Jaylene Jacobson MD;  Location: [facility] OR     HC TOOTH EXTRACTION W/FORCEP       LAPAROSCOPIC HYSTERECTOMY TOTAL, BILATERAL SALPINGO-OOPHORECTOMY, NODE DISSECTION, COMBINED  7/1/2013    Procedure: COMBINED LAPAROSCOPIC HYSTERECTOMY TOTAL, SALPINGO-OOPHORECTOMY, NODE DISSECTION;  Total Laparoscopic Hysterectomy, Bilateral Salpingo Oophorectomy, Cystoscopy;  Surgeon: Jaelyn Khan MD;  Location: [facility] OR         Health Maintenance Due   Topic Date Due     HIV SCREEN (SYSTEM ASSIGNED)  07/01/1971     ADVANCE DIRECTIVE PLANNING Q5 YRS  09/12/2017     MAMMO Q1 YR  04/17/2018     FALL RISK ASSESSMENT  07/01/2018     PNEUMOCOCCAL (1 of 2 - PCV13) 07/01/2018     INFLUENZA VACCINE (1) 09/01/2018     PHQ-2 Q1 YR  09/12/2018       Current Medications:     Current Outpatient Prescriptions   Medication Sig Dispense Refill     Calcium Carbonate-Vitamin D (CALCIUM 500 + D PO) Take by mouth 2 times daily       Loperamide HCl (IMODIUM A-D PO) Take by mouth as needed Reported on 2/21/2017           Allergies:        Allergies   Allergen Reactions     Amoxicillin      rash        Social History:     Social History   Substance Use Topics     Smoking status: Never Smoker     Smokeless tobacco: Never Used     Alcohol use 0.0 oz/week     0 Standard drinks or equivalent per week      Comment: Rare, 1 per week       History   Drug Use No         Family History:     The patient's family history is notable for     Family History   Problem Relation Age of Onset     C.A.D. Mother      stents at age 78, passed away 1/30/10     Diabetes Mother      Hypertension Mother      HEART DISEASE Mother      Uses heart medication for Tachycardia, has had stent placement 2 at once      "Arthritis Mother      GASTROINTESTINAL DISEASE Mother      bowel obstruction - passed away age 86     Hypertension Father      Neurologic Disorder Daughter      astrocytoma     Connective Tissue Disorder Daughter      jra, in remission, melonoma     Cancer Daughter      melanoma     Other - See Comments Daughter      atypical ductal hyperplasia         Physical Exam:     /66  Pulse 99  Temp 98.4  F (36.9  C) (Oral)  Ht 1.664 m (5' 5.5\")  Wt 63.1 kg (139 lb 1.6 oz)  LMP 09/15/2007  SpO2 99%  BMI 22.8 kg/m2  Body mass index is 22.8 kg/(m^2).    General Appearance: healthy and alert, no distress     HEENT:  no palpable nodules or masses        Cardiovascular: regular rate and rhythm, no gallops, rubs or murmurs     Respiratory: lungs clear, no rales, rhonchi or wheezes    Musculoskeletal: extremities non tender and without edema    Skin: no lesions or rashes     Neurological: normal gait, no gross defects     Psychiatric: appropriate mood and affect                               Hematological: normal cervical, supraclavicular and inguinal lymph nodes     Gastrointestinal:       abdomen soft, non-tender, non-distended, no organomegaly or masses    Genitourinary: External genitalia and urethral meatus appears normal. Patient has Right vulvar nevi which remains the same and she follows with dermatology. No other abnormal lesions noted. Vagina is smooth without nodularity or masses. Cervix surgically absent. Bimanual exam reveal no masses, nodularity or fullness. Recto-vaginal exam confirms these findings.      Assessment:      Leydi Zaragoza is a 62 year old woman with a diagnosis of stage IA grade 1 endometrioid adenocarcinoma s/p TLH, BSO on 7/1/13. She is here today for a surveillance visit.      Plan:       1.) Stage 1A Grade 1 Endometrioid Adenocarcinoma. ANYA on today's exam. She has completed 5 year surveillance. Will extend her visits to 1 year follow up. She can follow up her or with her PCP for " annual surveillance with H&P and pelvic exam. Reviewed signs and symptoms for when she should contact the clinic or seek additional care. Patient to contact the clinic with any questions or concerns in the interim.  2.) Vaginal Dryness. Reviewed use of lubricant for daily use as needed; adequate amount of lubricant for intercourse, as well as changing positions for comfort.  3.) Labs and/or tests ordered include: None today.  4.) Health maintenance issues addressed today include follow up for annual health maintenance and non-gyn issues with PCP.    5.) Pulmonary nodules - CT scan reviewed with patient. She was given a copy of the results. Recommended repeat scan in 18-24 months. Discussed if congested cough worsens or does not improve recommend follow up with her PCP.   Sera Gresham CNP  10/9/2018 11:23 AM      CC  Patient Care Team:  Mary Leonardo DO as PCP - General  Mary Rousseau PsyD as Psychologist (Psychology)  SELF, REFERRED

## 2018-10-09 ENCOUNTER — ONCOLOGY VISIT (OUTPATIENT)
Dept: ONCOLOGY | Facility: CLINIC | Age: 63
End: 2018-10-09
Attending: NURSE PRACTITIONER
Payer: COMMERCIAL

## 2018-10-09 ENCOUNTER — RADIANT APPOINTMENT (OUTPATIENT)
Dept: MAMMOGRAPHY | Facility: CLINIC | Age: 63
End: 2018-10-09
Payer: COMMERCIAL

## 2018-10-09 VITALS
SYSTOLIC BLOOD PRESSURE: 136 MMHG | HEIGHT: 66 IN | HEART RATE: 99 BPM | WEIGHT: 139.1 LBS | TEMPERATURE: 98.4 F | DIASTOLIC BLOOD PRESSURE: 66 MMHG | BODY MASS INDEX: 22.35 KG/M2 | OXYGEN SATURATION: 99 %

## 2018-10-09 DIAGNOSIS — C54.1 ENDOMETRIAL/UTERINE ADENOCARCINOMA (H): Primary | ICD-10-CM

## 2018-10-09 DIAGNOSIS — Z12.31 VISIT FOR SCREENING MAMMOGRAM: ICD-10-CM

## 2018-10-09 DIAGNOSIS — R91.1 PULMONARY NODULE: ICD-10-CM

## 2018-10-09 PROCEDURE — 99213 OFFICE O/P EST LOW 20 MIN: CPT | Mod: ZP | Performed by: NURSE PRACTITIONER

## 2018-10-09 PROCEDURE — G0463 HOSPITAL OUTPT CLINIC VISIT: HCPCS | Mod: ZF

## 2018-10-09 ASSESSMENT — PAIN SCALES - GENERAL: PAINLEVEL: NO PAIN (0)

## 2018-10-09 NOTE — NURSING NOTE
"Oncology Rooming Note    October 9, 2018 9:59 AM   Leydi Zaragoza is a 63 year old female who presents for:    Chief Complaint   Patient presents with     Oncology Clinic Visit     6 Month F.U     Initial Vitals: /66  Pulse 99  Temp 98.4  F (36.9  C) (Oral)  Ht 1.664 m (5' 5.5\")  Wt 63.1 kg (139 lb 1.6 oz)  LMP 09/15/2007  SpO2 99%  BMI 22.8 kg/m2 Estimated body mass index is 22.8 kg/(m^2) as calculated from the following:    Height as of this encounter: 1.664 m (5' 5.5\").    Weight as of this encounter: 63.1 kg (139 lb 1.6 oz). Body surface area is 1.71 meters squared.  No Pain (0) Comment: Data Unavailable   Patient's last menstrual period was 09/15/2007.  Allergies reviewed: Yes  Medications reviewed: Yes    Medications: Medication refills not needed today.  Pharmacy name entered into [facility]:    [id number] [place] [facility] - [place] - [address]  [id number] - [place] - [address]  [id number] - [place] - [address] AT  [address] [place] & [facility]    Clinical concerns:      8 minutes for nursing intake (face to face time)     Nadeen Frias CMA              "

## 2018-10-09 NOTE — MR AVS SNAPSHOT
"              After Visit Summary   10/9/2018    Leydi Zaragoza    MRN: [id number]           Patient Information     Date Of Birth          1955        Visit Information        Provider Department      10/9/2018 9:40 AM Sera Tavera APRN CNP [facility]        Today's Diagnoses     Endometrial/uterine adenocarcinoma (H)    -  1    Pulmonary nodule           Follow-ups after your visit        Follow-up notes from your care team     Return in about 1 year (around 10/9/2019).      Who to contact     If you have questions or need follow up information about today's clinic visit or your schedule please contact [facility] directly at [phone].  Normal or non-critical lab and imaging results will be communicated to you by [employer] [facility], letter or phone within 4 business days after the clinic has received the results. If you do not hear from us within 7 days, please contact the clinic through [employer] [facility] or phone. If you have a critical or abnormal lab result, we will notify you by phone as soon as possible.  Submit refill requests through [facility] or call your pharmacy and they will forward the refill request to us. Please allow 3 business days for your refill to be completed.          Additional Information About Your Visit        MyChart Information     [facility] gives you secure access to your electronic health record. If you see a primary care provider, you can also send messages to your care team and make appointments. If you have questions, please call your primary care clinic.  If you do not have a primary care provider, please call [phone] and they will assist you.        Care EveryWhere ID     This is your Care EveryWhere ID. This could be used by other organizations to access your [facility] medical records  [id number]        Your Vitals Were     Pulse Temperature Height Last Period Pulse Oximetry BMI (Body Mass Index)    99 98.4  F (36.9  C) (Oral) 1.664 m (5' 5.5\") " 09/15/2007 99% 22.8 kg/m2       Blood Pressure from Last 3 Encounters:   10/09/18 136/66   05/16/18 127/78   03/27/18 143/68    Weight from Last 3 Encounters:   10/09/18 63.1 kg (139 lb 1.6 oz)   05/16/18 60.7 kg (133 lb 14.4 oz)   03/27/18 62.5 kg (137 lb 11.2 oz)              Today, you had the following     No orders found for display       Primary Care Provider Office Phone # Fax #    Mary Leonardo [phone]       [address] [facility]  [address]  [facility] [address]        Equal Access to Services     GONZÁLEZ HAMLIN : Alex Malagon, manav dias, sindy kaalmajose quintanilla, raiza cerna. So [facility] [place] [phone].    ATENCIÓN: Si habla español, tiene a youssef disposición servicios gratuitos de asistencia lingüística. Llame al [phone].    We comply with applicable federal civil rights laws and [place] laws. We do not discriminate on the basis of race, color, national origin, age, disability, sex, sexual orientation, or gender identity.            Thank you!     Thank you for choosing [facility]  for your care. Our goal is always to provide you with excellent care. Hearing back from our patients is one way we can continue to improve our services. Please take a few minutes to complete the written survey that you may receive in the mail after your visit with us. Thank you!             Your Updated Medication List - Protect others around you: Learn how to safely use, store and throw away your medicines at www.disposemymeds.org.          This list is accurate as of 10/9/18 11:25 AM.  Always use your most recent med list.                   Brand Name Dispense Instructions for use Diagnosis    CALCIUM 500 + D PO      Take by mouth 2 times daily        IMODIUM A-D PO      Take by mouth as needed Reported on 2/21/2017

## 2018-10-09 NOTE — LETTER
10/9/2018       RE: Leydi Zaragoza  [address]  [facility] [address]     Dear Colleague,    Thank you for referring your patient, Leydi Zaragoza, to the [facility]. Please see a copy of my visit note below.                Follow Up Notes on Referred Patient    Date: 10/9/2018     RE: Leydi Zaragoza  : 1955  BARB: 10/9/2018      Leydi Zaragoza is a 62 year old woman with a diagnosis of stage 1A grade 1 endometrioid adenocarcinoma s/p TLH, BSO on 13.     She is here today for a surveillance visit. Patient notes that she has been doing well. Has had a little bit of a congested cough over the past couple of days. No fevers or SOB. She has chronic constipation/diarrhea secondary to her possible microscopic colitis and follows with GI. She is due for colonoscopy. Notes some upper abd pain that feels muscular in nature and has improved. She has approx 2 episodes of severe LLQ abd pain with abd bloating per year that is relieved with lying on her left side. She follows with her GI provider regarding this and is instructed to go to the ED to get an xray. She has not had an episode for some time. Notes chronic urinary inc that improves with kegal exercises. Denies signes of UTI. Continues to experience vaginal dryness and low libido that she is not concerned about.  Denies vulvar or perineal irritation. She has chronic difficulty sleeping, urinary inc, vaginal dryness, and generalized arthralgias and myalgias. She had her mammo today and results are pending. She will follow up with GI for colonoscopy. She follows with her PCP for comorbid conditions and routine health screenings.    Cancer History:    The patient presented for evaluation of an episode of postmenopausal bleeding 13. Also c/o RUQ pain. No cramping. Pelvic ultrasound done on 13 normal uterus and ovaries, endometrial cavity containing three masses measuring 2.1 x 2.1 x 1.2 cm, 1.2 x 1.3 x 0.8 cm, 0.8 x  1.0 x 0.7 cm.    6/21/13: Dilatation and curettage, operative hysteroscopy with MyoSure morcellation of endometrial polyps with Jaylene Jacobson MD    FINAL DIAGNOSIS:  Uterus, endometrium, polyp:  - Complex atypical hyperplasia.    Uterus, endometrium, polypectomy:  - Adenocarcinoma arising in complex atypical hyperplasia. Fragmented, but markedly atypical endometrium. There is marked cytologic atypia. In some areas, branching glandular forms are closely opposed. There is mild squamoid morular metaplasia. Some features are worrisome for vicente carcinoma; however, the biopsy is quite fragmented. Separately submitted fragments labeled morcellated polyp shows similar features with more confluent areas, thus meeting criteria for adenocarcinoma .   7/1/13: Exam under anesthesia, total laparoscopic hysterectomy, bilateral salpingo-oophorectomy, pelvic washings, cystoscopy  Pathologic Staging: pT1a pNX.  Primary Tumor (pT): pT1a [1A]: Tumor invades less than one-half of the myometrium.  Regional lymph nodes (pN): pNX: Cannot be assessed  No nodes submitted or found  Distant Metastasis (pM): Not applicable.  7/17/13: Post op visit. Pathology reviewed in detail with the patient and copy provided. In light of the grade 1 and limited depth of invasion < 30%, no addition adjuvant therapy is recommended at this time. Potential risk of recurrence reviewed and sign for possible recurrence reviewed. Plan for scheduled tumor surveillance every 3 months for the first 2 years and them every 6 months for an addition 3 years. Patient is encouraged to continue follow up for routine cancer surveillance including breast and colon cancer.  10/24/13: Pap NIL.      10/30/14: Surveillance visit. Feeling well. She denies any vaginal bleeding, no new changes in her bowel (has seen her PCP for diarrhea and is being worked up) or bladder habits (has scant amount of urine loss in the morning), no nausea/emesis, no lower extremity edema, and no  "difficulties eating or sleeping. She denies any abdominal discomfort/bloating, no fevers or chills, and no chest pain or shortness of breath. She states her health screenings are current, she is sexually active and using a lubricant, and reports occassional dryness for which she uses coconut oil with good results.       02/11/15: Survelliance visit. She reports feeling well. Denies any vaginal bleeding, abd pain/discomfort, or abd bloating/cramping. No new changes in her bowel (has seen her PCP for diarrhea and is being worked up), she takes benafiber and has had regular bm. No change in bladder habits, no nausea/emesis, no lower extremity edema, and no difficulties eating or sleeping. No fevers or chills, and no chest pain or shortness of breath. She states her health screenings are current except she is due for mammogram, she is sexually active and using a lubricant, and reports occassional dryness for which she uses coconut oil with good results.       4/21/15: Patient called the clinic and spoke with clinic staff: Assessment: Pt of Dr. Khan with HX of grade 1 stage IA endometrioid adenocarcinoma s/p TLH, BSO on 7/1/13 . Is scheduled to have her 3 month check on 5/12. However would like a sooner appointment. Pt has discovered a lump between her vagina and rectum which is irritated and when touched has a pin prick sensation. Difficult for pt to assess. Two friends who were diagnosed with cancer at the same time she was have within the last month been diagnosed with metastatic cancer, \"they are goners\" Pt is very concerned. Plan: Suggested pt see her PCP now but does not want to do that. Sooner appointment given on 4/28 at 10:20 AM      4/27/15: Follow up of lump between her vagina and rectum which is irritated when touched that has a pin prick sensation. She notes RLQ discomfort stabbing sensation off and on. 3 month follow up.  7/28/15: 3 month follow up  02/9/16: 6 month follow up   8/9/16: 6 month follow up "   2/21/17: 6 month follow up   9/12/17: 6 month follow up   3/26/18: 6 month follow up  04/18/18: CT CAP IMPRESSION:  1. Indeterminate 7 mm left upper lobe pulmonary nodule. Metastatic disease or primary lung malignancy cannot be excluded. It is likely too small to be evaluated by PET/CT. Follow-up CT in 6 months to establish stability. 2. Otherwise no evidence of metastatic disease in the chest, abdomen or pelvis. 3. 1.8 cm right lobe thyroid nodule. Recommend evaluation with ultrasound. 4. Small fat density structure within the duodenum, favored to represent ingested material or a lipoma. [Recommend Follow Up: Lung nodule]  4/24/18: US Thyroid IMPRESSION: Uncomplicated fine-needle aspiration of the partially calcific right thyroid nodule. Thyroid, right, inferior lobe, nodule, ultrasound guided fine needle   aspiration:Benign -Consistent with a benign nodule (includes adenomatoid nodule, colloid nodule, etc.) Specimen Adequacy: Satisfactory for evaluation.   10/2/18: CT chest IMPRESSION: 1. Previously seen partially calcified pulmonary nodule in the posterior left upper lobe is grossly unchanged measuring 8mm since 04/18/2018 and favors a benign etiology. Consider optional follow-up CT at 18-24 months. 2. Scattered distal mucoid impaction especially in the right upper lobe.         Review of Systems:      Systemic no weight changes; no fever; no chills; occasional night sweats; no appetite changes + difficulty sleeping  Skin no new rashes, or lesions.  Eye no irritation; no changes in vision  Magali-Laryngeal no dysphagia; no hoarseness + congested cough  Pulmonary no cough; no shortness of breath  Cardiovascular no chest pain; no palpitations  Gastrointestinal + intermittent diarrhea; + intermittent constipation; + intermittent  upper abdominal pain; no changes in bowel habits; no blood in stool.  Genitourinary no urinary frequency; no urinary urgency; no dysuria; no pain; no abnormal vaginal discharge; no abnormal  vaginal bleeding; + vaginal dryness + decrease libido + chronic inc that remains the same    Musculoskeletal + myalgias; + arthralgias; + tingling remains the same  Psychiatric no depressed mood; no anxiety    Hematologic no tender lymph nodes; no noticeable swellings or lumps    Endocrine + hot flashes controlled; no heat/cold intolerance    Neurological no tremor; occasional numbness and tingling to ue and le; no headaches; + difficulty sleeping     Answers for HPI/ROS submitted by the patient on 10/2/2018   General Symptoms: No  Skin Symptoms: No  HENT Symptoms: No  EYE SYMPTOMS: No  HEART SYMPTOMS: No  LUNG SYMPTOMS: No  INTESTINAL SYMPTOMS: Yes  URINARY SYMPTOMS: Yes  GYNECOLOGIC SYMPTOMS: Yes  BREAST SYMPTOMS: No  SKELETAL SYMPTOMS: Yes  BLOOD SYMPTOMS: No  NERVOUS SYSTEM SYMPTOMS: No  MENTAL HEALTH SYMPTOMS: No  Heart burn or indigestion: No  Nausea: No  Vomiting: No  Abdominal pain: Yes - pull muscles improved  Bloating: No  Constipation: Yes - normal  Diarrhea: Yes - normal  Blood in stool: No  Black stools: No  Rectal or Anal pain: No  Fecal incontinence: No  Yellowing of skin or eyes: No  Vomit with blood: No  Change in stools: Yes - typical  Trouble holding urine or incontinence: Yes - same  Pain or burning: No  Trouble starting or stopping: Yes - same  Increased frequency of urination: No  Blood in urine: No  Decreased frequency of urination: No  Frequent nighttime urination: Yes - same  Flank pain: No  Difficulty emptying bladder: No  Back pain: No  Muscle aches: No  Neck pain: No  Swollen joints: Yes  Joint pain: Yes  Bone pain: No  Muscle cramps: No  Muscle weakness: No  Joint stiffness: Yes  Bone fracture: No  Bleeding or spotting between periods: No  Heavy or painful periods: No  Irregular periods: No  Vaginal discharge: No  Hot flashes: No  Vaginal dryness: Yes   Genital ulcers: No  Reduced libido: Yes  Painful intercourse: No  Difficulty with sexual arousal: Yes      Past Medical History:    Past  Medical History:   Diagnosis Date     Acute bronchospasm     one year but resolved - exercise induced.     Uterine polyp          Past Surgical History:    Past Surgical History:   Procedure Laterality Date     COLONOSCOPY  10/11/11     DILATION AND CURETTAGE, OPERATIVE HYSTEROSCOPY WITH MORCELLATOR, COMBINED  6/20/2013    Procedure: COMBINED DILATION AND CURETTAGE, OPERATIVE HYSTEROSCOPY WITH MORCELLATOR;  Dilation And Curettage, Operative Hysteroscopy With Myosure, Morcellation of endometrial polyps.;  Surgeon: Jaylene Jacobson MD;  Location: [facility] OR     HC TOOTH EXTRACTION W/FORCEP       LAPAROSCOPIC HYSTERECTOMY TOTAL, BILATERAL SALPINGO-OOPHORECTOMY, NODE DISSECTION, COMBINED  7/1/2013    Procedure: COMBINED LAPAROSCOPIC HYSTERECTOMY TOTAL, SALPINGO-OOPHORECTOMY, NODE DISSECTION;  Total Laparoscopic Hysterectomy, Bilateral Salpingo Oophorectomy, Cystoscopy;  Surgeon: Jaelyn Khan MD;  Location: [facility] OR         Health Maintenance Due   Topic Date Due     HIV SCREEN (SYSTEM ASSIGNED)  07/01/1971     ADVANCE DIRECTIVE PLANNING Q5 YRS  09/12/2017     MAMMO Q1 YR  04/17/2018     FALL RISK ASSESSMENT  07/01/2018     PNEUMOCOCCAL (1 of 2 - PCV13) 07/01/2018     INFLUENZA VACCINE (1) 09/01/2018     PHQ-2 Q1 YR  09/12/2018       Current Medications:     Current Outpatient Prescriptions   Medication Sig Dispense Refill     Calcium Carbonate-Vitamin D (CALCIUM 500 + D PO) Take by mouth 2 times daily       Loperamide HCl (IMODIUM A-D PO) Take by mouth as needed Reported on 2/21/2017           Allergies:        Allergies   Allergen Reactions     Amoxicillin      rash        Social History:     Social History   Substance Use Topics     Smoking status: Never Smoker     Smokeless tobacco: Never Used     Alcohol use 0.0 oz/week     0 Standard drinks or equivalent per week      Comment: Rare, 1 per week       History   Drug Use No         Family History:     The patient's family history is notable for     Family History  "  Problem Relation Age of Onset     C.A.D. Mother      stents at age 78, passed away 1/30/10     Diabetes Mother      Hypertension Mother      HEART DISEASE Mother      Uses heart medication for Tachycardia, has had stent placement 2 at once     Arthritis Mother      GASTROINTESTINAL DISEASE Mother      bowel obstruction - passed away age 86     Hypertension Father      Neurologic Disorder Daughter      astrocytoma     Connective Tissue Disorder Daughter      jrsatish, in remission, melonoma     Cancer Daughter      melanoma     Other - See Comments Daughter      atypical ductal hyperplasia         Physical Exam:     /66  Pulse 99  Temp 98.4  F (36.9  C) (Oral)  Ht 1.664 m (5' 5.5\")  Wt 63.1 kg (139 lb 1.6 oz)  LMP 09/15/2007  SpO2 99%  BMI 22.8 kg/m2  Body mass index is 22.8 kg/(m^2).    General Appearance: healthy and alert, no distress     HEENT:  no palpable nodules or masses        Cardiovascular: regular rate and rhythm, no gallops, rubs or murmurs     Respiratory: lungs clear, no rales, rhonchi or wheezes    Musculoskeletal: extremities non tender and without edema    Skin: no lesions or rashes     Neurological: normal gait, no gross defects     Psychiatric: appropriate mood and affect                               Hematological: normal cervical, supraclavicular and inguinal lymph nodes     Gastrointestinal:       abdomen soft, non-tender, non-distended, no organomegaly or masses    Genitourinary: External genitalia and urethral meatus appears normal. Patient has Right vulvar nevi which remains the same and she follows with dermatology. No other abnormal lesions noted. Vagina is smooth without nodularity or masses. Cervix surgically absent. Bimanual exam reveal no masses, nodularity or fullness. Recto-vaginal exam confirms these findings.      Assessment:      Leydi Zaragoza is a 62 year old woman with a diagnosis of stage IA grade 1 endometrioid adenocarcinoma s/p TLH, BSO on 7/1/13. She is here " today for a surveillance visit.      Plan:       1.) Stage 1A Grade 1 Endometrioid Adenocarcinoma. ANYA on today's exam. She has completed 5 year surveillance. Will extend her visits to 1 year follow up. She can follow up her or with her PCP for annual surveillance with H&P and pelvic exam. Reviewed signs and symptoms for when she should contact the clinic or seek additional care. Patient to contact the clinic with any questions or concerns in the interim.  2.) Vaginal Dryness. Reviewed use of lubricant for daily use as needed; adequate amount of lubricant for intercourse, as well as changing positions for comfort.  3.) Labs and/or tests ordered include: None today.  4.) Health maintenance issues addressed today include follow up for annual health maintenance and non-gyn issues with PCP.    5.) Pulmonary nodules - CT scan reviewed with patient. She was given a copy of the results. Recommended repeat scan in 18-24 months. Discussed if congested cough worsens or does not improve recommend follow up with her PCP.   Sera Gresham CNP  10/9/2018 11:23 AM      CC  Patient Care Team:  Mary Leonardo DO as PCP - General  Mary Rousseau PsyD as Psychologist (Psychology)

## 2019-01-21 ENCOUNTER — OFFICE VISIT (OUTPATIENT)
Dept: URGENT CARE | Facility: URGENT CARE | Age: 64
End: 2019-01-21
Payer: COMMERCIAL

## 2019-01-21 ENCOUNTER — MYC MEDICAL ADVICE (OUTPATIENT)
Dept: FAMILY MEDICINE | Facility: CLINIC | Age: 64
End: 2019-01-21

## 2019-01-21 VITALS
TEMPERATURE: 98.1 F | RESPIRATION RATE: 16 BRPM | SYSTOLIC BLOOD PRESSURE: 138 MMHG | HEART RATE: 69 BPM | DIASTOLIC BLOOD PRESSURE: 82 MMHG | OXYGEN SATURATION: 98 %

## 2019-01-21 DIAGNOSIS — D49.2 ABNORMAL SKIN GROWTH: ICD-10-CM

## 2019-01-21 DIAGNOSIS — K13.79 MOUTH SORE: Primary | ICD-10-CM

## 2019-01-21 PROCEDURE — 99213 OFFICE O/P EST LOW 20 MIN: CPT | Performed by: PHYSICIAN ASSISTANT

## 2019-01-21 NOTE — PROGRESS NOTES
SUBJECTIVE:  Leydi Zaragoza is a 63 year old female with a chief complaint of having a spot on roof of mouth.   Onset of symptoms was 2 week(s) ago.    Course of illness: lesion comes and goes.  Severity mild  Current and Associated symptoms: left soft palate small growth  Treatment measures tried include none.  Predisposing factors include viral infection.    Past Medical History:   Diagnosis Date     Acute bronchospasm     one year but resolved - exercise induced.     Uterine polyp      Allergies   Allergen Reactions     Amoxicillin      rash     Social History     Tobacco Use     Smoking status: Never Smoker     Smokeless tobacco: Never Used   Substance Use Topics     Alcohol use: Yes     Alcohol/week: 0.0 oz     Comment: Rare, 1 per week     Family History   Problem Relation Age of Onset     C.A.D. Mother         stents at age 78, passed away 1/30/10     Diabetes Mother      Hypertension Mother      Heart Disease Mother         Uses heart medication for Tachycardia, has had stent placement 2 at once     Arthritis Mother      Gastrointestinal Disease Mother         bowel obstruction - passed away age 86     Hypertension Father      Neurologic Disorder Daughter         astrocytoma     Connective Tissue Disorder Daughter         jra, in remission, melonoma     Cancer Daughter         melanoma     Other - See Comments Daughter         atypical ductal hyperplasia         ROS:  CONSTITUTIONAL:NEGATIVE for fever, chills, change in weight  INTEGUMENTARY/SKIN: NEGATIVE for worrisome rashes, moles or lesions  EYES: NEGATIVE for vision changes or irritation  ENT/MOUTH: POSITIVE for fleshy grown left side mouth  RESP:NEGATIVE for significant cough or SOB  CV: NEGATIVE for chest pain, palpitations or peripheral edema  GI: NEGATIVE for nausea, abdominal pain, heartburn, or change in bowel habits  MUSCULOSKELETAL: NEGATIVE for significant arthralgias or myalgia  NEURO: NEGATIVE for weakness, dizziness or  paresthesias    OBJECTIVE:   /82   Pulse 69   Temp 98.1  F (36.7  C) (Oral)   Resp 16   LMP 09/15/2007   SpO2 98%   GENERAL APPEARANCE: healthy, alert and no distress  EYES: EOMI,  PERRL, conjunctiva clear  HENT: TM's normal bilaterally and left soft palate inflammation, localized area  NECK: supple, non-tender to palpation, no adenopathy noted  RESP: lungs clear to auscultation - no rales, rhonchi or wheezes  CV: regular rates and rhythm, normal S1 S2, no murmur noted  SKIN: Positive for soft fleshy colored inflammatory area      ASSESSMENT/PLAN      ICD-10-CM    1. Mouth sore K13.79    2. Abnormal skin growth D49.2      Watch symptoms closely  Advised to have follow up in the next couple wks with ENT to have this lesion evaluated

## 2019-02-20 NOTE — TELEPHONE ENCOUNTER
FUTURE VISIT INFORMATION      FUTURE VISIT INFORMATION:    Date: 2/21/19    Time: 8AM    Location: [facility]  REFERRAL INFORMATION:    Referring provider:  Mary Leonardo DO    Referring providers clinic:  [facility]     Reason for visit/diagnosis  Oral lesion     RECORDS REQUESTED FROM:       Clinic name Comments Records Status Imaging Status    [facility] 1/21/19 notes with Paolo MARES

## 2019-02-21 ENCOUNTER — PRE VISIT (OUTPATIENT)
Dept: OTOLARYNGOLOGY | Facility: CLINIC | Age: 64
End: 2019-02-21

## 2019-02-21 ENCOUNTER — OFFICE VISIT (OUTPATIENT)
Dept: OTOLARYNGOLOGY | Facility: CLINIC | Age: 64
End: 2019-02-21
Payer: COMMERCIAL

## 2019-02-21 VITALS
SYSTOLIC BLOOD PRESSURE: 144 MMHG | WEIGHT: 138 LBS | DIASTOLIC BLOOD PRESSURE: 90 MMHG | BODY MASS INDEX: 22.18 KG/M2 | HEIGHT: 66 IN

## 2019-02-21 DIAGNOSIS — K13.79 MUCOCELE OF MOUTH: Primary | ICD-10-CM

## 2019-02-21 ASSESSMENT — PAIN SCALES - GENERAL: PAINLEVEL: NO PAIN (0)

## 2019-02-21 ASSESSMENT — MIFFLIN-ST. JEOR: SCORE: 1189.96

## 2019-02-21 NOTE — NURSING NOTE
"Chief Complaint   Patient presents with     Consult     oral lesion      Blood pressure 144/90, height 1.68 m (5' 6.14\"), weight 62.6 kg (138 lb), last menstrual period 09/15/2007, not currently breastfeeding.    Parmjit Howe LPN    "

## 2019-02-21 NOTE — PROGRESS NOTES
Otolaryngology Adult Consultation    Patient: Leydi Zaragoza  : 1955      HPI:  Leydi Zaragoza is a 63 year old female seen today in the Otolaryngology Clinic for oral lesion.  Patient reports that she has had a small lesion in the back of her throat just above her tonsils that comes and goes and has been present for several years.  Today it is not present.  When it is present however even though it small she does feel a little bit more pressure in the back of her throat and a little bit more discomfort and irritation.  She does think that eating seems to exacerbate it more.  Other associated symptoms that she has noticed includes a little bit of tingling at the tip of her tongue.  Several years ago she had a lot of gum irritation as well.  A few weeks ago she in addition to the main bump in the back of her throat she also saw few smaller lesions in the surrounding it.  She also does have some issues with chronic throat irritation.  It is not 10 out of 10 pain but a little bit of discomfort in the back of her throat.  She does not smoke or chew tobacco.  Patient does have a picture of this lesion.    It looks to be approximately 3-4mm smooth mucosalized lesion that appears just above the left superior pole of the tonsil that is in the peritonsillar area.    Medications:  Current Outpatient Rx   Medication Sig Dispense Refill     Calcium Carbonate-Vitamin D (CALCIUM 500 + D PO) Take by mouth 2 times daily       Loperamide HCl (IMODIUM A-D PO) Take by mouth as needed Reported on 2017         Allergies: Amoxicillin     PMH:  Past Medical History:   Diagnosis Date     Acute bronchospasm     one year but resolved - exercise induced.     Cancer (H)     endometrioid andenocarcinoma     Uterine polyp        PSH:  Past Surgical History:   Procedure Laterality Date     COLONOSCOPY  10/11/11     DILATION AND CURETTAGE, OPERATIVE HYSTEROSCOPY WITH MORCELLATOR, COMBINED  2013    Procedure: COMBINED  DILATION AND CURETTAGE, OPERATIVE HYSTEROSCOPY WITH MORCELLATOR;  Dilation And Curettage, Operative Hysteroscopy With Myosure, Morcellation of endometrial polyps.;  Surgeon: Jaylene Jacobson MD;  Location: [facility] OR     HC TOOTH EXTRACTION W/FORCEP       LAPAROSCOPIC HYSTERECTOMY TOTAL, BILATERAL SALPINGO-OOPHORECTOMY, NODE DISSECTION, COMBINED  7/1/2013    Procedure: COMBINED LAPAROSCOPIC HYSTERECTOMY TOTAL, SALPINGO-OOPHORECTOMY, NODE DISSECTION;  Total Laparoscopic Hysterectomy, Bilateral Salpingo Oophorectomy, Cystoscopy;  Surgeon: Jaelyn Khan MD;  Location: [facility] OR       FH:  Family History   Problem Relation Age of Onset     C.A.D. Mother         stents at age 78, passed away 1/30/10     Diabetes Mother      Hypertension Mother      Heart Disease Mother         Uses heart medication for Tachycardia, has had stent placement 2 at once     Arthritis Mother      Gastrointestinal Disease Mother         bowel obstruction - passed away age 86     Hypertension Father      Asthma Father      Heart Disease Father      Neurologic Disorder Daughter         astrocytoma     Connective Tissue Disorder Daughter         jra, in remission, melonoma     Cancer Daughter         melanoma     Other - See Comments Daughter         atypical ductal hyperplasia     Cancer Daughter      Cancer Daughter      Diabetes Brother      Hypertension Brother         SH:  Social History     Tobacco Use     Smoking status: Never Smoker     Smokeless tobacco: Never Used   Substance Use Topics     Alcohol use: Yes     Alcohol/week: 0.0 oz     Comment: Rare, 1 per week     Drug use: No       Review of Systems   ENT ROS 2/21/2019   Ears, Nose, Throat Nasal congestion or drainage, Sore throat   Cardiopulmonary Cough   Skin Skin lesions       Physical Exam:    GEN:  The patient is alert, oriented and in no acute distress.  HEAD:  Head, face scalp is grossly normal.  ORAL:  Oral cavity shows healthy mucosa with out ulceration, masses or other  lesions                involving the tongue, palate, buccal mucosa, floor of mouth or gingiva.   Tonsils are 1+ and symmetric.  She does have tonsil stones present on the left side.  Examination of the soft palate on the left side shows no physical mass but I do feel an area of a little bit more induration in the peritonsillar area versus the right side.  NECK:   Neck is without adenopathy, thyroid or salivary gland masses.  PUL: normal work of breathing; no stridor  CV: RRR; no peripheral edema  M/S: normal muscle tone     Assessment/Plan: Patient presents with left sided soft palatal oral lesion that I think is consistent with a mucocele.  I discussed with the patient what a mucocele is.  Typically to treat a mucocele and make it go away we would have to excise it.  Little bit more difficult when we do not actually have a corresponding lesion to remove since we would not know if we are actually removing the offending minor salivary gland.  The location also is a little bit more difficult just given that it is more in the back of her throat.  I discussed with her that if it did come back and it was bothering her we could attempt to excise it in clinic I think it would be possible.  I do not think that her tongue tingling however is related to this.  I think this is a separate issue and may actually be a sign of burning mouth syndrome.  We also discussed that she did have tonsil stones.  I would recommend self-cleaning particularly if she finds them that she is having a little more throat irritation.    I spent a total of 35 minutes face-to-face with Leydi Zaragoza during today's office visit.  Over 50% of this time was spent counseling the patient on and/or coordinating care as documented in my assessment and plan.

## 2019-02-21 NOTE — PATIENT INSTRUCTIONS
You will need to follow up in clinic as needed if the oral lesion does come back and it is bothersome.        Please call our clinic for any questions,concerns,or worsening symptoms.      Clinic #[phone]       Option 1 for scheduling.    Stephanie FUCHS RNCC

## 2019-02-21 NOTE — LETTER
2019       RE: Leydi Zaragoza  [address]  [facility] [address]     Dear Colleague,    Thank you for referring your patient, Leydi Zaragoza, to the [facility] EAR NOSE AND THROAT at [facility]. Please see a copy of my visit note below.    Otolaryngology Adult Consultation    Patient: Leydi Zaragoza  : 1955      HPI:  Leydi Zaragoza is a 63 year old female seen today in the Otolaryngology Clinic for oral lesion.  Patient reports that she has had a small lesion in the back of her throat just above her tonsils that comes and goes and has been present for several years.  Today it is not present.  When it is present however even though it small she does feel a little bit more pressure in the back of her throat and a little bit more discomfort and irritation.  She does think that eating seems to exacerbate it more.  Other associated symptoms that she has noticed includes a little bit of tingling at the tip of her tongue.  Several years ago she had a lot of gum irritation as well.  A few weeks ago she in addition to the main bump in the back of her throat she also saw few smaller lesions in the surrounding it.  She also does have some issues with chronic throat irritation.  It is not 10 out of 10 pain but a little bit of discomfort in the back of her throat.  She does not smoke or chew tobacco.  Patient does have a picture of this lesion.    It looks to be approximately 3-4mm smooth mucosalized lesion that appears just above the left superior pole of the tonsil that is in the peritonsillar area.    Medications:  Current Outpatient Rx   Medication Sig Dispense Refill     Calcium Carbonate-Vitamin D (CALCIUM 500 + D PO) Take by mouth 2 times daily       Loperamide HCl (IMODIUM A-D PO) Take by mouth as needed Reported on 2017         Allergies: Amoxicillin     PMH:  Past Medical History:   Diagnosis Date     Acute bronchospasm     one year but resolved -  exercise induced.     Cancer (H) 2013    endometrioid andenocarcinoma     Uterine polyp        PSH:  Past Surgical History:   Procedure Laterality Date     COLONOSCOPY  10/11/11     DILATION AND CURETTAGE, OPERATIVE HYSTEROSCOPY WITH MORCELLATOR, COMBINED  6/20/2013    Procedure: COMBINED DILATION AND CURETTAGE, OPERATIVE HYSTEROSCOPY WITH MORCELLATOR;  Dilation And Curettage, Operative Hysteroscopy With Myosure, Morcellation of endometrial polyps.;  Surgeon: Jaylene Jacobson MD;  Location: [facility]     HC TOOTH EXTRACTION W/FORCEP       LAPAROSCOPIC HYSTERECTOMY TOTAL, BILATERAL SALPINGO-OOPHORECTOMY, NODE DISSECTION, COMBINED  7/1/2013    Procedure: COMBINED LAPAROSCOPIC HYSTERECTOMY TOTAL, SALPINGO-OOPHORECTOMY, NODE DISSECTION;  Total Laparoscopic Hysterectomy, Bilateral Salpingo Oophorectomy, Cystoscopy;  Surgeon: Jaelyn Khan MD;  Location: [facility] OR       FH:  Family History   Problem Relation Age of Onset     C.A.D. Mother         stents at age 78, passed away 1/30/10     Diabetes Mother      Hypertension Mother      Heart Disease Mother         Uses heart medication for Tachycardia, has had stent placement 2 at once     Arthritis Mother      Gastrointestinal Disease Mother         bowel obstruction - passed away age 86     Hypertension Father      Asthma Father      Heart Disease Father      Neurologic Disorder Daughter         astrocytoma     Connective Tissue Disorder Daughter         jra, in remission, melonoma     Cancer Daughter         melanoma     Other - See Comments Daughter         atypical ductal hyperplasia     Cancer Daughter      Cancer Daughter      Diabetes Brother      Hypertension Brother         SH:  Social History     Tobacco Use     Smoking status: Never Smoker     Smokeless tobacco: Never Used   Substance Use Topics     Alcohol use: Yes     Alcohol/week: 0.0 oz     Comment: Rare, 1 per week     Drug use: No       Review of Systems   ENT ROS 2/21/2019   Ears, Nose, Throat Nasal  congestion or drainage, Sore throat   Cardiopulmonary Cough   Skin Skin lesions       Physical Exam:    GEN:  The patient is alert, oriented and in no acute distress.  HEAD:  Head, face scalp is grossly normal.  ORAL:  Oral cavity shows healthy mucosa with out ulceration, masses or other lesions                involving the tongue, palate, buccal mucosa, floor of mouth or gingiva.   Tonsils are 1+ and symmetric.  She does have tonsil stones present on the left side.  Examination of the soft palate on the left side shows no physical mass but I do feel an area of a little bit more induration in the peritonsillar area versus the right side.  NECK:   Neck is without adenopathy, thyroid or salivary gland masses.  PUL: normal work of breathing; no stridor  CV: RRR; no peripheral edema  M/S: normal muscle tone     Assessment/Plan: Patient presents with left sided soft palatal oral lesion that I think is consistent with a mucocele.  I discussed with the patient what a mucocele is.  Typically to treat a mucocele and make it go away we would have to excise it.  Little bit more difficult when we do not actually have a corresponding lesion to remove since we would not know if we are actually removing the offending minor salivary gland.  The location also is a little bit more difficult just given that it is more in the back of her throat.  I discussed with her that if it did come back and it was bothering her we could attempt to excise it in clinic I think it would be possible.  I do not think that her tongue tingling however is related to this.  I think this is a separate issue and may actually be a sign of burning mouth syndrome.  We also discussed that she did have tonsil stones.  I would recommend self-cleaning particularly if she finds them that she is having a little more throat irritation.    I spent a total of 35 minutes face-to-face with LeydiNicholas Zaragoza during today's office visit.  Over 50% of this time was spent  counseling the patient on and/or coordinating care as documented in my assessment and plan.        Soco Powers MD

## 2019-05-26 ENCOUNTER — TRANSFERRED RECORDS (OUTPATIENT)
Dept: HEALTH INFORMATION MANAGEMENT | Facility: CLINIC | Age: 64
End: 2019-05-26

## 2019-09-30 ENCOUNTER — HEALTH MAINTENANCE LETTER (OUTPATIENT)
Age: 64
End: 2019-09-30

## 2019-10-22 ENCOUNTER — OFFICE VISIT (OUTPATIENT)
Dept: FAMILY MEDICINE | Facility: CLINIC | Age: 64
End: 2019-10-22
Payer: COMMERCIAL

## 2019-10-22 VITALS
HEART RATE: 56 BPM | OXYGEN SATURATION: 97 % | SYSTOLIC BLOOD PRESSURE: 128 MMHG | DIASTOLIC BLOOD PRESSURE: 76 MMHG | TEMPERATURE: 97.7 F | HEIGHT: 66 IN | RESPIRATION RATE: 16 BRPM | WEIGHT: 134 LBS | BODY MASS INDEX: 21.53 KG/M2

## 2019-10-22 DIAGNOSIS — Z78.0 ASYMPTOMATIC POSTMENOPAUSAL STATUS: ICD-10-CM

## 2019-10-22 DIAGNOSIS — Z00.00 ENCOUNTER FOR ROUTINE ADULT HEALTH EXAMINATION WITHOUT ABNORMAL FINDINGS: Primary | ICD-10-CM

## 2019-10-22 DIAGNOSIS — E04.1 THYROID NODULE: ICD-10-CM

## 2019-10-22 DIAGNOSIS — D12.6 ADENOMATOUS POLYP OF COLON, UNSPECIFIED PART OF COLON: ICD-10-CM

## 2019-10-22 DIAGNOSIS — Z12.11 SPECIAL SCREENING FOR MALIGNANT NEOPLASMS, COLON: ICD-10-CM

## 2019-10-22 DIAGNOSIS — Z00.00 ENCOUNTER FOR MEDICARE ANNUAL WELLNESS EXAM: ICD-10-CM

## 2019-10-22 DIAGNOSIS — R91.1 PULMONARY NODULE: ICD-10-CM

## 2019-10-22 LAB
ANION GAP SERPL CALCULATED.3IONS-SCNC: 3 MMOL/L (ref 3–14)
BUN SERPL-MCNC: 19 MG/DL (ref 7–30)
CALCIUM SERPL-MCNC: 9.3 MG/DL (ref 8.5–10.1)
CHLORIDE SERPL-SCNC: 106 MMOL/L (ref 94–109)
CHOLEST SERPL-MCNC: 206 MG/DL
CO2 SERPL-SCNC: 31 MMOL/L (ref 20–32)
CREAT SERPL-MCNC: 0.66 MG/DL (ref 0.52–1.04)
GFR SERPL CREATININE-BSD FRML MDRD: >90 ML/MIN/{1.73_M2}
GLUCOSE SERPL-MCNC: 91 MG/DL (ref 70–99)
HDLC SERPL-MCNC: 76 MG/DL
LDLC SERPL CALC-MCNC: 120 MG/DL
NONHDLC SERPL-MCNC: 130 MG/DL
POTASSIUM SERPL-SCNC: 4.7 MMOL/L (ref 3.4–5.3)
SODIUM SERPL-SCNC: 140 MMOL/L (ref 133–144)
TRIGL SERPL-MCNC: 52 MG/DL
TSH SERPL DL<=0.005 MIU/L-ACNC: 2.48 MU/L (ref 0.4–4)

## 2019-10-22 PROCEDURE — 80061 LIPID PANEL: CPT | Performed by: FAMILY MEDICINE

## 2019-10-22 PROCEDURE — 84443 ASSAY THYROID STIM HORMONE: CPT | Performed by: FAMILY MEDICINE

## 2019-10-22 PROCEDURE — 36415 COLL VENOUS BLD VENIPUNCTURE: CPT | Performed by: FAMILY MEDICINE

## 2019-10-22 PROCEDURE — 99397 PER PM REEVAL EST PAT 65+ YR: CPT | Performed by: FAMILY MEDICINE

## 2019-10-22 PROCEDURE — 99207 C PAF COMPLETED  NO CHARGE: CPT | Mod: 25 | Performed by: FAMILY MEDICINE

## 2019-10-22 PROCEDURE — 80048 BASIC METABOLIC PNL TOTAL CA: CPT | Performed by: FAMILY MEDICINE

## 2019-10-22 ASSESSMENT — MIFFLIN-ST. JEOR: SCORE: 1166.82

## 2019-10-22 ASSESSMENT — ACTIVITIES OF DAILY LIVING (ADL): CURRENT_FUNCTION: NO ASSISTANCE NEEDED

## 2019-10-22 NOTE — PATIENT INSTRUCTIONS
Patient Education   Personalized Prevention Plan  You are due for the preventive services outlined below.  Your care team is available to assist you in scheduling these services.  If you have already completed any of these items, please share that information with your care team to update in your medical record.  Health Maintenance Due   Topic Date Due     Zoster (Shingles) Vaccine (1 of 2) 07/01/2003     Discuss Advance Care Planning  09/12/2017     Annual Wellness Visit  07/01/2018     Pneumococcal Vaccine (1 of 2 - PCV13) 07/01/2018     Colonscopy  04/07/2019     Flu Vaccine (1) 09/01/2019     FALL RISK ASSESSMENT  10/09/2019     Mammogram  10/09/2019       Signs of Hearing Loss     Hearing much better with one ear can be a sign of hearing loss.     Hearing loss is a problem shared by many people. In fact, it is one of the most common health conditions, particularly as people age. Most people over age 65 have some hearing loss, and by age 80, almost everyone does. Because hearing loss usually occurs slowly over the years, you may not realize your hearing ability has gotten worse.  Have your hearing checked  Contact your healthcare provider if you:    Have to strain to hear normal conversation    Have to watch other people s faces very carefully to follow what they re saying    Need to ask people to repeat what they ve said    Often misunderstand what people are saying    Turn the volume of the television or radio up so high that others complain    Feel that people are mumbling when they re talking to you    Find that the effort to hear leaves you feeling tired and irritated    Notice, when using the phone, that you hear better with one ear than the other  Date Last Reviewed: 12/1/2016 2000-2018 [employer]. [address] [place] [address], [place] [address]. All rights reserved. This information is not intended as a substitute for professional medical care. Always follow your healthcare professional's  instructions.          Urinary Incontinence, Female (Adult)  Urinary incontinence means loss of control of the bladder. This problem affects many women, especially as they get older. If you have incontinence, you may be embarrassed to ask for help. But know that this problem can be treated.  Types of Incontinence  There are different types of incontinence. Two of the main types are described here. You can have more than one type.    Stress incontinence. With this type, urine leaks when pressure (stress) is put on the bladder. This may happen when you cough, sneeze, or laugh. Stress incontinence most often occurs because the pelvic floor muscles that support the bladder and urethra are weak. This can happen after pregnancy and vaginal childbirth or a hysterectomy. It can also be due to excess body weight or hormone changes.    Urge incontinence (also called overactive bladder). With this type, a sudden urge to urinate is felt often. This may happen even though there may not be much urine in the bladder. The need to urinate often during the night is common. Urge incontinence most often occurs because of bladder spasms. This may be due to bladder irritation or infection. Damage to bladder nerves or pelvic muscles, constipation, and certain medicines can also lead to urge incontinence.  Treatment of urinary incontinence depends on the cause. Further evaluation is needed to find the type you have. This will likely include an exam and certain tests. Based on the results, you and your healthcare provider can then plan treatment. Until a diagnosis is made, the home care tips below can help relieve symptoms.  Home care    Do pelvic floor muscle exercises, if they are prescribed. The pelvic floor muscles help support the bladder and urethra. Many women find that their symptoms improve when doing special exercises that strengthen these muscles. To do the exercises contract the muscles you would use to stop your stream of  urine, but do this when you re not urinating. Hold for 10 seconds, then relax. Repeat 10 to 20 times in a row, at least 3 times a day. Your provider may give you other instructions for how to do the exercises and how often.    Keep a bladder diary. This helps track how often and how much you urinate over a set period of time. Bring this diary with you to your next visit with the provider. The information can help your provider learn more about your bladder problem.    Lose weight, if advised to by your provider. Excess weight puts pressure on the bladder. Your provider can help you create a weight-loss plan that s right for you. This may include exercising more and making certain diet changes.    Don't consume foods and drinks that may irritate the bladder. These can include alcohol and caffeinated drinks.    Quit smoking. Smoking and other tobacco use can lead to chronic cough that strains the pelvic floor muscles. Smoking may also damage the bladder and urethra. Talk with your provider about treatments or methods you can use to quit smoking.    If drinking large amounts of fluid causes you to have symptoms, you may be advised to limit your fluid intake. You may also be advised to drink most of your fluids during the day and to limit fluids at night.    If you re worried about urine leakage or accidents, you may wear absorbent pads to catch urine. Change the pads often. This helps reduce discomfort. It may also reduce the risk of skin or bladder infections.  Follow-up care  Follow up with your healthcare provider, or as directed. It may take some to find the right treatment for your problem. Your treatment plan may include special therapies or medicines. Certain procedures or surgery may also be options. Be sure to discuss any questions you have with your provider.  When to seek medical advice  Call the healthcare provider right away if any of these occur:    Fever of 100.4 F (38 C) or higher, or as directed by  your provider    Bladder pain or fullness    Abdominal swelling    Nausea or vomiting    Back pain    Weakness, dizziness or fainting  Date Last Reviewed: 10/1/2017    9628-9805 [employer]. [address] [place] [address], [place] [address]. All rights reserved. This information is not intended as a substitute for professional medical care. Always follow your healthcare professional's instructions.

## 2019-10-22 NOTE — NURSING NOTE
"Chief Complaint   Patient presents with     Physical     /76   Pulse 56   Temp 97.7  F (36.5  C) (Oral)   Resp 16   Ht 1.68 m (5' 6.14\")   Wt 60.8 kg (134 lb)   LMP 09/15/2007   SpO2 97%   BMI 21.54 kg/m   Estimated body mass index is 21.54 kg/m  as calculated from the following:    Height as of this encounter: 1.68 m (5' 6.14\").    Weight as of this encounter: 60.8 kg (134 lb).  bp completed using cuff size: regular       Health Maintenance addressed:  Mammogram and Colonoscopy/FIT    Gave pt phone number/pended order to schedule mammo and/or colonoscopy(or FIT)    Autumn Mays, CMA, MA     "

## 2019-10-22 NOTE — PROGRESS NOTES
"SUBJECTIVE:   Leydi Zaragoza is a 64 year old female who presents for Preventive Visit.      Are you in the first 12 months of your Medicare coverage?  No    Healthy Habits:     In general, how would you rate your overall health?  Good    Frequency of exercise:  2-3 days/week    Duration of exercise:  30-45 minutes    Do you usually eat at least 4 servings of fruit and vegetables a day, include whole grains    & fiber and avoid regularly eating high fat or \"junk\" foods?  Yes    Taking medications regularly:  Not Applicable    Medication side effects:  Not applicable    Ability to successfully perform activities of daily living:  No assistance needed    Home Safety:  No safety concerns identified    Hearing Impairment:  Difficulty following a conversation in a noisy restaurant or crowded room and difficulty understanding soft or whispered speech    In the past 6 months, have you been bothered by leaking of urine? Yes    In general, how would you rate your overall mental or emotional health?  Excellent      PHQ-2 Total Score: 0    Additional concerns today:  No    Do you feel safe in your environment? Yes    Do you have a Health Care Directive? No: Advance care planning was reviewed with patient; patient declined at this time.    all risk  Fallen 2 or more times in the past year?: No  Any fall with injury in the past year?: No    Cognitive Screening   1) Repeat 3 items (Leader, Season, Table)    2) Clock draw: NORMAL  3) 3 item recall: Recalls 3 objects  Results: 3 items recalled: COGNITIVE IMPAIRMENT LESS LIKELY    Mini-CogTM Copyright JAXON Dolan. Licensed by the author for use in [facility]; reprinted with permission (bassam@.[facility]). All rights reserved.      Do you have sleep apnea, excessive snoring or daytime drowsiness?: no    Reviewed and updated as needed this visit by clinical staff  Tobacco  Allergies  Meds  Problems  Med Hx  Surg Hx  Fam Hx         Reviewed and updated as needed this " visit by Provider  Tobacco  Allergies  Meds  Problems  Med Hx  Surg Hx  Fam Hx        Social History     Tobacco Use     Smoking status: Never Smoker     Smokeless tobacco: Never Used   Substance Use Topics     Alcohol use: Yes     Alcohol/week: 0.0 standard drinks     Comment: Rare, 1 per week     If you drink alcohol do you typically have >3 drinks per day or >7 drinks per week? No    Alcohol Use 10/22/2019   Prescreen: >3 drinks/day or >7 drinks/week? No   Prescreen: >3 drinks/day or >7 drinks/week? -   No flowsheet data found.        Oncology Follow up next step     Current providers sharing in care for this patient include:   Patient Care Team:  Mary Leonardo DO as PCP - General  Mary Leonardo DO as Assigned PCP  Mary Rousseau PsyD as Psychologist (Psychology)    The following health maintenance items are reviewed in Epic and correct as of today:  Health Maintenance   Topic Date Due     ZOSTER IMMUNIZATION (1 of 2) 07/01/2003     ADVANCE CARE PLANNING  09/12/2017     MEDICARE ANNUAL WELLNESS VISIT  07/01/2018     PNEUMOCOCCAL IMMUNIZATION 65+ HIGH/HIGHEST RISK (1 of 2 - PCV13) 07/01/2018     COLONOSCOPY  04/07/2019     INFLUENZA VACCINE (1) 09/01/2019     FALL RISK ASSESSMENT  10/09/2019     MAMMO SCREENING  10/09/2019     LIPID  04/12/2022     DTAP/TDAP/TD IMMUNIZATION (4 - Td) 01/05/2026     DEXA  Completed     HEPATITIS C SCREENING  Completed     PHQ-2  Completed     IPV IMMUNIZATION  Aged Out     MENINGITIS IMMUNIZATION  Aged Out     Patient Active Problem List   Diagnosis     Mucous polyp of cervix     CARDIOVASCULAR SCREENING; LDL GOAL LESS THAN 160     Advanced directives, counseling/discussion     Adenomatous colon polyp     Dysplastic nevus     Endometrial/uterine adenocarcinoma (H)     Thyroid nodule     Pulmonary nodule     Past Surgical History:   Procedure Laterality Date     COLONOSCOPY  10/11/11     DILATION AND CURETTAGE, OPERATIVE HYSTEROSCOPY WITH MORCELLATOR, COMBINED  6/20/2013     Procedure: COMBINED DILATION AND CURETTAGE, OPERATIVE HYSTEROSCOPY WITH MORCELLATOR;  Dilation And Curettage, Operative Hysteroscopy With Myosure, Morcellation of endometrial polyps.;  Surgeon: Jaylene Jacobson MD;  Location[facility] OR     HC TOOTH EXTRACTION W/FORCEP       LAPAROSCOPIC HYSTERECTOMY TOTAL, BILATERAL SALPINGO-OOPHORECTOMY, NODE DISSECTION, COMBINED  7/1/2013    Procedure: COMBINED LAPAROSCOPIC HYSTERECTOMY TOTAL, SALPINGO-OOPHORECTOMY, NODE DISSECTION;  Total Laparoscopic Hysterectomy, Bilateral Salpingo Oophorectomy, Cystoscopy;  Surgeon: Jaelyn Khan MD;  [facility] OR       Social History     Tobacco Use     Smoking status: Never Smoker     Smokeless tobacco: Never Used   Substance Use Topics     Alcohol use: Yes     Alcohol/week: 0.0 standard drinks     Comment: Rare, 1 per week     Family History   Problem Relation Age of Onset     C.A.D. Mother         stents at age 78, passed away 1/30/10     Diabetes Mother      Hypertension Mother      Heart Disease Mother         Uses heart medication for Tachycardia, has had stent placement 2 at once     Arthritis Mother      Gastrointestinal Disease Mother         bowel obstruction - passed away age 86     Hypertension Father      Asthma Father      Heart Disease Father      Neurologic Disorder Daughter         astrocytoma     Connective Tissue Disorder Daughter         jra, in remission, melonoma     Cancer Daughter         melanoma     Other - See Comments Daughter         atypical ductal hyperplasia     Cancer Daughter      Cancer Daughter      Diabetes Brother      Hypertension Brother          Pneumonia Vaccine:patient declined today  Mammogram Screening: Mammogram Screening: Patient over age 50, mutual decision to screen reflected in health maintenance.  Pt has hx of endometrial cancer - pap no longer indicated    Review of Systems  Constitutional, HEENT, cardiovascular, pulmonary, GI, , musculoskeletal, neuro, skin, endocrine and  "psych systems are negative, except as otherwise noted.    OBJECTIVE:   /76   Pulse 56   Temp 97.7  F (36.5  C) (Oral)   Resp 16   Ht 1.68 m (5' 6.14\")   Wt 60.8 kg (134 lb)   LMP 09/15/2007   SpO2 97%   BMI 21.54 kg/m   Estimated body mass index is 21.54 kg/m  as calculated from the following:    Height as of this encounter: 1.68 m (5' 6.14\").    Weight as of this encounter: 60.8 kg (134 lb).  Physical Exam  GENERAL APPEARANCE: healthy, alert and no distress  EYES: Eyes grossly normal to inspection, PERRL and conjunctivae and sclerae normal  HENT: ear canals and TM's normal, nose and mouth without ulcers or lesions, oropharynx clear and oral mucous membranes moist  NECK: no adenopathy, no asymmetry, masses, or scars and thyroid normal to palpation  RESP: lungs clear to auscultation - no rales, rhonchi or wheezes  BREAST: normal without masses, tenderness or nipple discharge and no palpable axillary masses or adenopathy  CV: regular rate and rhythm, normal S1 S2, no S3 or S4, no murmur, click or rub, no peripheral edema and peripheral pulses strong  ABDOMEN: soft, nontender, no hepatosplenomegaly, no masses and bowel sounds normal   (female): normal female external genitalia, normal urethral meatus, vaginal mucosal atrophy noted and normal post-hysterectomy exam without masses.   MS: no musculoskeletal defects are noted and gait is age appropriate without ataxia  SKIN: no suspicious lesions or rashes  NEURO: Normal strength and tone, sensory exam grossly normal, mentation intact and speech normal  PSYCH: mentation appears normal and affect normal/bright    Diagnostic Test Results:  Labs reviewed in Epic  pending    ASSESSMENT / PLAN:   1. Encounter for routine adult health examination without abnormal findings  Routine screening    2. Adenomatous polyp of colon, unspecified part of colon  Pt due for colonoscopy this year - last was hyperplastic polyp       3. Pulmonary nodule  Due for CT in 6-12 " "months    4. Encounter for Medicare annual wellness exam     - Lipid panel reflex to direct LDL Fasting  - Basic metabolic panel  (Ca, Cl, CO2, Creat, Gluc, K, Na, BUN)    5. Special screening for malignant neoplasms, colon     - GASTROENTEROLOGY ADULT REF PROCEDURE ONLY None    6. Thyroid nodule     - TSH with free T4 reflex    7. Asymptomatic postmenopausal status     - DX Hip/Pelvis/Spine; Future    End of Life Planning:  Patient currently has an advanced directive: Yes.  Practitioner is supportive of decision.    COUNSELING:  Reviewed preventive health counseling, as reflected in patient instructions    Estimated body mass index is 21.54 kg/m  as calculated from the following:    Height as of this encounter: 1.68 m (5' 6.14\").    Weight as of this encounter: 60.8 kg (134 lb).    Weight management plan noted, stable and monitoring     reports that she has never smoked. She has never used smokeless tobacco.      Appropriate preventive services were discussed with this patient, including applicable screening as appropriate for cardiovascular disease, diabetes, osteopenia/osteoporosis, and glaucoma.  As appropriate for age/gender, discussed screening for colorectal cancer, prostate cancer, breast cancer, and cervical cancer. Checklist reviewing preventive services available has been given to the patient.    Reviewed patients plan of care and provided an AVS. The Basic Care Plan (routine screening as documented in Health Maintenance) for Leydi meets the Care Plan requirement. This Care Plan has been established and reviewed with the Patient.    Counseling Resources:  ATP IV Guidelines  Pooled Cohorts Equation Calculator  Breast Cancer Risk Calculator  FRAX Risk Assessment  ICSI Preventive Guidelines  Dietary Guidelines for Americans, 2010  USDA's MyPlate  ASA Prophylaxis  Lung CA Screening    Mary Leonardo,   [facility]    Identified Health Risks:  "

## 2019-10-23 ENCOUNTER — HOSPITAL ENCOUNTER (OUTPATIENT)
Dept: BONE DENSITY | Facility: CLINIC | Age: 64
Discharge: HOME | End: 2019-10-23
Attending: FAMILY MEDICINE | Admitting: FAMILY MEDICINE
Payer: COMMERCIAL

## 2019-10-23 DIAGNOSIS — Z78.0 ASYMPTOMATIC POSTMENOPAUSAL STATUS: ICD-10-CM

## 2019-10-23 PROCEDURE — 77080 DXA BONE DENSITY AXIAL: CPT

## 2019-10-23 NOTE — RESULT ENCOUNTER NOTE
Dear Leydi,   Your test results are all back -   You bone density did decrease slightly since the last check.  It is still in the mild osteopenia range so keep working on healthy eating (calcium and vitamin D) and weight bearing exercise.  Plan to recheck in 2-3 years.  Let us know if you have any questions.  -Mary Leonardo, DO

## 2019-12-15 ENCOUNTER — HEALTH MAINTENANCE LETTER (OUTPATIENT)
Age: 64
End: 2019-12-15

## 2020-10-28 ENCOUNTER — OFFICE VISIT (OUTPATIENT)
Dept: FAMILY MEDICINE | Facility: CLINIC | Age: 65
End: 2020-10-28
Payer: COMMERCIAL

## 2020-10-28 VITALS
BODY MASS INDEX: 21.86 KG/M2 | HEIGHT: 66 IN | WEIGHT: 136 LBS | SYSTOLIC BLOOD PRESSURE: 106 MMHG | HEART RATE: 71 BPM | DIASTOLIC BLOOD PRESSURE: 71 MMHG | OXYGEN SATURATION: 98 % | TEMPERATURE: 96.4 F

## 2020-10-28 DIAGNOSIS — C54.1 ENDOMETRIAL/UTERINE ADENOCARCINOMA (H): ICD-10-CM

## 2020-10-28 DIAGNOSIS — R91.1 PULMONARY NODULE: ICD-10-CM

## 2020-10-28 DIAGNOSIS — Z00.00 ENCOUNTER FOR MEDICARE ANNUAL WELLNESS EXAM: Primary | ICD-10-CM

## 2020-10-28 DIAGNOSIS — E04.1 THYROID NODULE: ICD-10-CM

## 2020-10-28 DIAGNOSIS — Z23 VACCINE FOR STREPTOCOCCUS PNEUMONIAE AND INFLUENZA: ICD-10-CM

## 2020-10-28 DIAGNOSIS — L57.0 ACTINIC KERATOSIS: ICD-10-CM

## 2020-10-28 DIAGNOSIS — H90.6 MIXED HEARING LOSS, BILATERAL: ICD-10-CM

## 2020-10-28 PROCEDURE — 99397 PER PM REEVAL EST PAT 65+ YR: CPT | Mod: 25 | Performed by: FAMILY MEDICINE

## 2020-10-28 PROCEDURE — 84443 ASSAY THYROID STIM HORMONE: CPT | Performed by: FAMILY MEDICINE

## 2020-10-28 PROCEDURE — 80048 BASIC METABOLIC PNL TOTAL CA: CPT | Performed by: FAMILY MEDICINE

## 2020-10-28 PROCEDURE — 90732 PPSV23 VACC 2 YRS+ SUBQ/IM: CPT | Performed by: FAMILY MEDICINE

## 2020-10-28 PROCEDURE — 90471 IMMUNIZATION ADMIN: CPT | Performed by: FAMILY MEDICINE

## 2020-10-28 PROCEDURE — 99213 OFFICE O/P EST LOW 20 MIN: CPT | Mod: 25 | Performed by: FAMILY MEDICINE

## 2020-10-28 PROCEDURE — 36415 COLL VENOUS BLD VENIPUNCTURE: CPT | Performed by: FAMILY MEDICINE

## 2020-10-28 PROCEDURE — 80061 LIPID PANEL: CPT | Performed by: FAMILY MEDICINE

## 2020-10-28 ASSESSMENT — ACTIVITIES OF DAILY LIVING (ADL): CURRENT_FUNCTION: NO ASSISTANCE NEEDED

## 2020-10-28 ASSESSMENT — MIFFLIN-ST. JEOR: SCORE: 1163.88

## 2020-10-28 NOTE — PROGRESS NOTES
"SUBJECTIVE:   Leydi Zaragoza is a 65 year old female who presents for Preventive Visit.      Patient has been advised of split billing requirements and indicates understanding: Yes   Are you in the first 12 months of your Medicare coverage?  No    Healthy Habits:     In general, how would you rate your overall health?  Excellent    Frequency of exercise:  2-3 days/week    Duration of exercise:  15-30 minutes    Do you usually eat at least 4 servings of fruit and vegetables a day, include whole grains    & fiber and avoid regularly eating high fat or \"junk\" foods?  Yes    Taking medications regularly:  Not Applicable    Ability to successfully perform activities of daily living:  No assistance needed    Home Safety:  No safety concerns identified    Hearing Impairment:  Difficulty following a conversation in a noisy restaurant or crowded room and feel that people are mumbling or not speaking clearly    In the past 6 months, have you been bothered by leaking of urine? Yes    In general, how would you rate your overall mental or emotional health?  Excellent      PHQ-2 Total Score: 0    Additional concerns today:  No    Do you feel safe in your environment? Yes    Have you ever done Advance Care Planning? (For example, a Health Directive, POLST, or a discussion with a medical provider or your loved ones about your wishes): No, advance care planning information given to patient to review.  Patient plans to discuss their wishes with loved ones or provider.        Fall risk  Fallen 2 or more times in the past year?: No  Any fall with injury in the past year?: No    Cognitive Screening   1) Repeat 3 items (Leader, Season, Table)    2) Clock draw: NORMAL  3) 3 item recall: Recalls 3 objects  Results: 3 items recalled: COGNITIVE IMPAIRMENT LESS LIKELY    Mini-CogTM Copyright S Kelsi. Licensed by the author for use in [facility]; reprinted with permission (bassam@.[facility]). All rights reserved.      Do you have " sleep apnea, excessive snoring or daytime drowsiness?: no    Reviewed and updated as needed this visit by clinical staff  Tobacco  Allergies  Meds              Reviewed and updated as needed this visit by Provider                Social History     Tobacco Use     Smoking status: Never Smoker     Smokeless tobacco: Never Used   Substance Use Topics     Alcohol use: Yes     Alcohol/week: 0.0 standard drinks     Comment: Rare, 1 per week     If you drink alcohol do you typically have >3 drinks per day or >7 drinks per week? No    Alcohol Use 10/28/2020   Prescreen: >3 drinks/day or >7 drinks/week? No   Prescreen: >3 drinks/day or >7 drinks/week? -   No flowsheet data found.        -------------------------------------    Current providers sharing in care for this patient include:   Patient Care Team:  Mary Leonardo DO as PCP - General  Mary Leonardo DO as Assigned PCP  Mary Rousseau PsyD as Psychologist (Psychology)    The following health maintenance items are reviewed in Epic and correct as of today:  Health Maintenance   Topic Date Due     ZOSTER IMMUNIZATION (1 of 2) 07/01/2003     Pneumococcal Vaccine: 65+ Years (1 of 1 - PPSV23) 07/01/2018     COLORECTAL CANCER SCREENING  04/07/2019     MAMMO SCREENING  10/09/2019     INFLUENZA VACCINE (1) 09/01/2020     FALL RISK ASSESSMENT  10/22/2020     MEDICARE ANNUAL WELLNESS VISIT  10/28/2021     LIPID  10/22/2024     ADVANCE CARE PLANNING  10/28/2025     DTAP/TDAP/TD IMMUNIZATION (4 - Td) 01/05/2026     DEXA  Completed     HEPATITIS C SCREENING  Completed     PHQ-2  Completed     Pneumococcal Vaccine: Pediatrics (0 to 5 Years) and At-Risk Patients (6 to 64 Years)  Aged Out     IPV IMMUNIZATION  Aged Out     MENINGITIS IMMUNIZATION  Aged Out     HEPATITIS B IMMUNIZATION  Aged Out     Patient Active Problem List   Diagnosis     Mucous polyp of cervix     CARDIOVASCULAR SCREENING; LDL GOAL LESS THAN 160     Advanced directives, counseling/discussion     Adenomatous  colon polyp     Dysplastic nevus     Endometrial/uterine adenocarcinoma (H)     Thyroid nodule     Pulmonary nodule     Past Surgical History:   Procedure Laterality Date     COLONOSCOPY  10/11/11     DILATION AND CURETTAGE, OPERATIVE HYSTEROSCOPY WITH MORCELLATOR, COMBINED  6/20/2013    Procedure: COMBINED DILATION AND CURETTAGE, OPERATIVE HYSTEROSCOPY WITH MORCELLATOR;  Dilation And Curettage, Operative Hysteroscopy With Myosure, Morcellation of endometrial polyps.;  Surgeon: Jaylene Jacobson MD;  Location: [facility] OR     HC TOOTH EXTRACTION W/FORCEP       LAPAROSCOPIC HYSTERECTOMY TOTAL, BILATERAL SALPINGO-OOPHORECTOMY, NODE DISSECTION, COMBINED  7/1/2013    Procedure: COMBINED LAPAROSCOPIC HYSTERECTOMY TOTAL, SALPINGO-OOPHORECTOMY, NODE DISSECTION;  Total Laparoscopic Hysterectomy, Bilateral Salpingo Oophorectomy, Cystoscopy;  Surgeon: Jaelyn Khan MD;  Location: [facility] OR       Social History     Tobacco Use     Smoking status: Never Smoker     Smokeless tobacco: Never Used   Substance Use Topics     Alcohol use: Yes     Alcohol/week: 0.0 standard drinks     Comment: Rare, 1 per week     Family History   Problem Relation Age of Onset     C.A.D. Mother         stents at age 78, passed away 1/30/10     Diabetes Mother      Hypertension Mother      Heart Disease Mother         Uses heart medication for Tachycardia, has had stent placement 2 at once     Arthritis Mother      Gastrointestinal Disease Mother         bowel obstruction - passed away age 86     Hypertension Father      Asthma Father      Heart Disease Father      Neurologic Disorder Daughter         astrocytoma     Connective Tissue Disorder Daughter         jra, in remission, melonoma     Cancer Daughter         melanoma     Other - See Comments Daughter         atypical ductal hyperplasia     Cancer Daughter      Cancer Daughter      Diabetes Brother      Hypertension Brother          Pneumonia Vaccine:Adults age 65+ who have not received previous  "Pneumovax (PPSV23) or PCV13 as an adultMammogram Screening: Mammogram Screening: Patient over age 50, mutual decision to screen reflected in health maintenance.    Review of Systems  Constitutional, HEENT, cardiovascular, pulmonary, GI, , musculoskeletal, neuro, skin, endocrine and psych systems are negative, except as otherwise noted.    OBJECTIVE:   /71   Pulse 71   Temp 96.4  F (35.8  C) (Tympanic)   Ht 1.669 m (5' 5.7\")   Wt 61.7 kg (136 lb)   LMP 09/15/2007   SpO2 98%   BMI 22.15 kg/m   Estimated body mass index is 22.15 kg/m  as calculated from the following:    Height as of this encounter: 1.669 m (5' 5.7\").    Weight as of this encounter: 61.7 kg (136 lb).  Physical Exam  GENERAL APPEARANCE: healthy, alert and no distress  EYES: Eyes grossly normal to inspection, PERRL and conjunctivae and sclerae normal  HENT: ear canals and TM's normal,   Deferred rest HEENT of exam due to mask for COVID  NECK: no adenopathy, no asymmetry, masses, or scars and thyroid normal to palpation  RESP: lungs clear to auscultation - no rales, rhonchi or wheezes  BREAST: normal without masses, tenderness or nipple discharge and no palpable axillary masses or adenopathy  CV: regular rate and rhythm, normal S1 S2, no S3 or S4, no murmur, click or rub, no peripheral edema and peripheral pulses strong  ABDOMEN: soft, nontender, no hepatosplenomegaly, no masses and bowel sounds normal   (female): normal female external genitalia, normal urethral meatus, vaginal mucosal atrophy noted and normal post-hysterectomy exam without masses.   MS: no musculoskeletal defects are noted and gait is age appropriate without ataxia  SKIN: no suspicious lesions or rashes  NEURO: Normal strength and tone, sensory exam grossly normal, mentation intact and speech normal  PSYCH: mentation appears normal and affect normal/bright    Diagnostic Test Results:  Labs reviewed in Epic    ASSESSMENT / PLAN:   1. Encounter for Medicare annual " "wellness exam     - Lipid panel reflex to direct LDL Fasting  - TSH with free T4 reflex  - Basic metabolic panel  (Ca, Cl, CO2, Creat, Gluc, K, Na, BUN)    2. Pulmonary nodule     - CT Chest w/o Contrast; Future    3. Endometrial/uterine adenocarcinoma (H)   Pelvic exam - no masses or abnormalities    4. Mixed hearing loss, bilateral  Referral for hearing test  - AUDIOLOGY ADULT REFERRAL; Future    5. Thyroid nodule       6. Vaccine for streptococcus pneumoniae and influenza  Given   - Pneumococcal vaccine 23 valent PPSV23  (Pneumovax) [56814]  - ADMIN: Vaccine, Initial (73194)    7. Actinic keratosis  referral  - DERMATOLOGY ADULT REFERRAL; Future    Patient has been advised of split billing requirements and indicates understanding: Yes  COUNSELING:  Reviewed preventive health counseling, as reflected in patient instructions    Estimated body mass index is 22.15 kg/m  as calculated from the following:    Height as of this encounter: 1.669 m (5' 5.7\").    Weight as of this encounter: 61.7 kg (136 lb).        She reports that she has never smoked. She has never used smokeless tobacco.      Appropriate preventive services were discussed with this patient, including applicable screening as appropriate for cardiovascular disease, diabetes, osteopenia/osteoporosis, and glaucoma.  As appropriate for age/gender, discussed screening for colorectal cancer, prostate cancer, breast cancer, and cervical cancer. Checklist reviewing preventive services available has been given to the patient.    Reviewed patients plan of care and provided an AVS. The Basic Care Plan (routine screening as documented in Health Maintenance) for Leydi meets the Care Plan requirement. This Care Plan has been established and reviewed with the Patient.    Counseling Resources:  ATP IV Guidelines  Pooled Cohorts Equation Calculator  Breast Cancer Risk Calculator  Breast Cancer: Medication to Reduce Risk  FRAX Risk Assessment  ICSI Preventive " Guidelines  Dietary Guidelines for Americans, 2010  USDA's MyPlate  ASA Prophylaxis  Lung CA Screening    Mary Leonardo DO  [facility]    Identified Health Risks:

## 2020-10-29 LAB
ANION GAP SERPL CALCULATED.3IONS-SCNC: 3 MMOL/L (ref 3–14)
BUN SERPL-MCNC: 19 MG/DL (ref 7–30)
CALCIUM SERPL-MCNC: 9.1 MG/DL (ref 8.5–10.1)
CHLORIDE SERPL-SCNC: 107 MMOL/L (ref 94–109)
CHOLEST SERPL-MCNC: 196 MG/DL
CO2 SERPL-SCNC: 31 MMOL/L (ref 20–32)
CREAT SERPL-MCNC: 0.72 MG/DL (ref 0.52–1.04)
GFR SERPL CREATININE-BSD FRML MDRD: 86 ML/MIN/{1.73_M2}
GLUCOSE SERPL-MCNC: 89 MG/DL (ref 70–99)
HDLC SERPL-MCNC: 77 MG/DL
LDLC SERPL CALC-MCNC: 110 MG/DL
NONHDLC SERPL-MCNC: 119 MG/DL
POTASSIUM SERPL-SCNC: 4.1 MMOL/L (ref 3.4–5.3)
SODIUM SERPL-SCNC: 141 MMOL/L (ref 133–144)
TRIGL SERPL-MCNC: 46 MG/DL
TSH SERPL DL<=0.005 MIU/L-ACNC: 2.86 MU/L (ref 0.4–4)

## 2020-11-04 ENCOUNTER — ANCILLARY PROCEDURE (OUTPATIENT)
Dept: CT IMAGING | Facility: CLINIC | Age: 65
End: 2020-11-04
Attending: FAMILY MEDICINE
Payer: COMMERCIAL

## 2020-11-04 DIAGNOSIS — R91.1 PULMONARY NODULE: ICD-10-CM

## 2020-11-04 PROCEDURE — 71250 CT THORAX DX C-: CPT | Performed by: RADIOLOGY

## 2020-11-04 NOTE — RESULT ENCOUNTER NOTE
Dear Leydi,   Your test results are all back -   CT is stable - no change in size or new concerns.  No further work-up needed.   Let us know if you have any questions.  -Mary Leonardo, DO

## 2020-11-05 ENCOUNTER — APPOINTMENT (OUTPATIENT)
Dept: CT IMAGING | Facility: CLINIC | Age: 65
End: 2020-11-05
Attending: EMERGENCY MEDICINE
Payer: COMMERCIAL

## 2020-11-05 ENCOUNTER — HOSPITAL ENCOUNTER (EMERGENCY)
Facility: CLINIC | Age: 65
Discharge: LEFT BEFORE DISCHARGE | End: 2020-11-05
Payer: COMMERCIAL

## 2020-11-05 ENCOUNTER — HOSPITAL ENCOUNTER (EMERGENCY)
Facility: CLINIC | Age: 65
Discharge: HOME | End: 2020-11-05
Attending: EMERGENCY MEDICINE | Admitting: EMERGENCY MEDICINE
Payer: COMMERCIAL

## 2020-11-05 VITALS
DIASTOLIC BLOOD PRESSURE: 66 MMHG | TEMPERATURE: 98.2 F | SYSTOLIC BLOOD PRESSURE: 147 MMHG | RESPIRATION RATE: 16 BRPM | HEIGHT: 66 IN | WEIGHT: 136 LBS | BODY MASS INDEX: 21.86 KG/M2 | HEART RATE: 78 BPM | OXYGEN SATURATION: 98 %

## 2020-11-05 VITALS
OXYGEN SATURATION: 98 % | SYSTOLIC BLOOD PRESSURE: 150 MMHG | TEMPERATURE: 98.2 F | RESPIRATION RATE: 18 BRPM | DIASTOLIC BLOOD PRESSURE: 67 MMHG | HEART RATE: 91 BPM

## 2020-11-05 DIAGNOSIS — R10.9 RIGHT FLANK PAIN: ICD-10-CM

## 2020-11-05 DIAGNOSIS — R11.2 NON-INTRACTABLE VOMITING WITH NAUSEA, UNSPECIFIED VOMITING TYPE: ICD-10-CM

## 2020-11-05 LAB
ALBUMIN SERPL-MCNC: 4.4 G/DL (ref 3.4–5)
ALBUMIN UR-MCNC: 10 MG/DL
ALP SERPL-CCNC: 65 U/L (ref 40–150)
ALT SERPL W P-5'-P-CCNC: 27 U/L (ref 0–50)
ANION GAP SERPL CALCULATED.3IONS-SCNC: 7 MMOL/L (ref 3–14)
APPEARANCE UR: CLEAR
AST SERPL W P-5'-P-CCNC: 19 U/L (ref 0–45)
BASOPHILS # BLD AUTO: 0 10E9/L (ref 0–0.2)
BASOPHILS NFR BLD AUTO: 0.3 %
BILIRUB SERPL-MCNC: 0.6 MG/DL (ref 0.2–1.3)
BILIRUB UR QL STRIP: NEGATIVE
BUN SERPL-MCNC: 19 MG/DL (ref 7–30)
CALCIUM SERPL-MCNC: 9.1 MG/DL (ref 8.5–10.1)
CHLORIDE SERPL-SCNC: 103 MMOL/L (ref 94–109)
CO2 SERPL-SCNC: 28 MMOL/L (ref 20–32)
COLOR UR AUTO: YELLOW
CREAT SERPL-MCNC: 0.64 MG/DL (ref 0.52–1.04)
DIFFERENTIAL METHOD BLD: NORMAL
EOSINOPHIL # BLD AUTO: 0.1 10E9/L (ref 0–0.7)
EOSINOPHIL NFR BLD AUTO: 1.2 %
ERYTHROCYTE [DISTWIDTH] IN BLOOD BY AUTOMATED COUNT: 12.6 % (ref 10–15)
GFR SERPL CREATININE-BSD FRML MDRD: >90 ML/MIN/{1.73_M2}
GLUCOSE SERPL-MCNC: 98 MG/DL (ref 70–99)
GLUCOSE UR STRIP-MCNC: NEGATIVE MG/DL
HCT VFR BLD AUTO: 42.2 % (ref 35–47)
HGB BLD-MCNC: 13.7 G/DL (ref 11.7–15.7)
HGB UR QL STRIP: NEGATIVE
IMM GRANULOCYTES # BLD: 0 10E9/L (ref 0–0.4)
IMM GRANULOCYTES NFR BLD: 0.3 %
KETONES UR STRIP-MCNC: 40 MG/DL
LEUKOCYTE ESTERASE UR QL STRIP: ABNORMAL
LYMPHOCYTES # BLD AUTO: 2.4 10E9/L (ref 0.8–5.3)
LYMPHOCYTES NFR BLD AUTO: 25.1 %
MCH RBC QN AUTO: 29.4 PG (ref 26.5–33)
MCHC RBC AUTO-ENTMCNC: 32.5 G/DL (ref 31.5–36.5)
MCV RBC AUTO: 91 FL (ref 78–100)
MONOCYTES # BLD AUTO: 0.8 10E9/L (ref 0–1.3)
MONOCYTES NFR BLD AUTO: 8.2 %
MUCOUS THREADS #/AREA URNS LPF: PRESENT /LPF
NEUTROPHILS # BLD AUTO: 6.3 10E9/L (ref 1.6–8.3)
NEUTROPHILS NFR BLD AUTO: 64.9 %
NITRATE UR QL: NEGATIVE
NRBC # BLD AUTO: 0 10*3/UL
NRBC BLD AUTO-RTO: 0 /100
PH UR STRIP: 5.5 PH (ref 5–7)
PLATELET # BLD AUTO: 306 10E9/L (ref 150–450)
POTASSIUM SERPL-SCNC: 4.2 MMOL/L (ref 3.4–5.3)
PROT SERPL-MCNC: 8.2 G/DL (ref 6.8–8.8)
RBC # BLD AUTO: 4.66 10E12/L (ref 3.8–5.2)
RBC #/AREA URNS AUTO: 1 /HPF (ref 0–2)
SODIUM SERPL-SCNC: 138 MMOL/L (ref 133–144)
SOURCE: ABNORMAL
SP GR UR STRIP: 1.02 (ref 1–1.03)
SQUAMOUS #/AREA URNS AUTO: 3 /HPF (ref 0–1)
UROBILINOGEN UR STRIP-MCNC: NORMAL MG/DL (ref 0–2)
WBC # BLD AUTO: 9.7 10E9/L (ref 4–11)
WBC #/AREA URNS AUTO: 8 /HPF (ref 0–5)

## 2020-11-05 PROCEDURE — 96374 THER/PROPH/DIAG INJ IV PUSH: CPT | Mod: 59

## 2020-11-05 PROCEDURE — 250N000011 HC RX IP 250 OP 636: Performed by: EMERGENCY MEDICINE

## 2020-11-05 PROCEDURE — 96361 HYDRATE IV INFUSION ADD-ON: CPT

## 2020-11-05 PROCEDURE — 250N000009 HC RX 250: Performed by: EMERGENCY MEDICINE

## 2020-11-05 PROCEDURE — 74177 CT ABD & PELVIS W/CONTRAST: CPT

## 2020-11-05 PROCEDURE — 87086 URINE CULTURE/COLONY COUNT: CPT | Performed by: EMERGENCY MEDICINE

## 2020-11-05 PROCEDURE — 85025 COMPLETE CBC W/AUTO DIFF WBC: CPT | Performed by: EMERGENCY MEDICINE

## 2020-11-05 PROCEDURE — 258N000003 HC RX IP 258 OP 636: Performed by: EMERGENCY MEDICINE

## 2020-11-05 PROCEDURE — 81001 URINALYSIS AUTO W/SCOPE: CPT | Performed by: EMERGENCY MEDICINE

## 2020-11-05 PROCEDURE — 96376 TX/PRO/DX INJ SAME DRUG ADON: CPT

## 2020-11-05 PROCEDURE — 80053 COMPREHEN METABOLIC PANEL: CPT | Performed by: EMERGENCY MEDICINE

## 2020-11-05 PROCEDURE — 99285 EMERGENCY DEPT VISIT HI MDM: CPT | Mod: 25

## 2020-11-05 PROCEDURE — 99285 EMERGENCY DEPT VISIT HI MDM: CPT | Performed by: EMERGENCY MEDICINE

## 2020-11-05 PROCEDURE — 96375 TX/PRO/DX INJ NEW DRUG ADDON: CPT

## 2020-11-05 RX ORDER — CIPROFLOXACIN 500 MG/1
500 TABLET, FILM COATED ORAL 2 TIMES DAILY
Qty: 6 TABLET | Refills: 0 | Status: SHIPPED | OUTPATIENT
Start: 2020-11-05 | End: 2020-11-05

## 2020-11-05 RX ORDER — TRAMADOL HYDROCHLORIDE 50 MG/1
50-100 TABLET ORAL EVERY 6 HOURS PRN
Qty: 15 TABLET | Refills: 0 | Status: SHIPPED | OUTPATIENT
Start: 2020-11-05 | End: 2020-11-05 | Stop reason: ALTCHOICE

## 2020-11-05 RX ORDER — CIPROFLOXACIN 500 MG/1
500 TABLET, FILM COATED ORAL 2 TIMES DAILY
Qty: 6 TABLET | Refills: 0 | Status: SHIPPED | OUTPATIENT
Start: 2020-11-05 | End: 2020-11-11

## 2020-11-05 RX ORDER — HYDROCODONE BITARTRATE AND ACETAMINOPHEN 5; 325 MG/1; MG/1
1 TABLET ORAL EVERY 6 HOURS PRN
Qty: 12 TABLET | Refills: 0 | Status: SHIPPED | OUTPATIENT
Start: 2020-11-05 | End: 2020-11-11

## 2020-11-05 RX ORDER — ONDANSETRON 4 MG/1
4 TABLET, ORALLY DISINTEGRATING ORAL EVERY 8 HOURS PRN
Qty: 10 TABLET | Refills: 0 | Status: SHIPPED | OUTPATIENT
Start: 2020-11-05 | End: 2020-11-08

## 2020-11-05 RX ORDER — ONDANSETRON 2 MG/ML
4 INJECTION INTRAMUSCULAR; INTRAVENOUS ONCE
Status: COMPLETED | OUTPATIENT
Start: 2020-11-05 | End: 2020-11-05

## 2020-11-05 RX ORDER — IOPAMIDOL 755 MG/ML
100 INJECTION, SOLUTION INTRAVASCULAR ONCE
Status: COMPLETED | OUTPATIENT
Start: 2020-11-05 | End: 2020-11-05

## 2020-11-05 RX ORDER — KETOROLAC TROMETHAMINE 15 MG/ML
15 INJECTION, SOLUTION INTRAMUSCULAR; INTRAVENOUS ONCE
Status: COMPLETED | OUTPATIENT
Start: 2020-11-05 | End: 2020-11-05

## 2020-11-05 RX ORDER — HYDROMORPHONE HCL IN WATER/PF 6 MG/30 ML
0.2 PATIENT CONTROLLED ANALGESIA SYRINGE INTRAVENOUS
Status: COMPLETED | OUTPATIENT
Start: 2020-11-05 | End: 2020-11-05

## 2020-11-05 RX ADMIN — KETOROLAC TROMETHAMINE 15 MG: 15 INJECTION, SOLUTION INTRAMUSCULAR; INTRAVENOUS at 15:43

## 2020-11-05 RX ADMIN — ONDANSETRON 4 MG: 2 INJECTION INTRAMUSCULAR; INTRAVENOUS at 15:44

## 2020-11-05 RX ADMIN — HYDROMORPHONE HYDROCHLORIDE 0.2 MG: 0.2 INJECTION, SOLUTION INTRAMUSCULAR; INTRAVENOUS; SUBCUTANEOUS at 16:49

## 2020-11-05 RX ADMIN — SODIUM CHLORIDE 57 ML: 9 INJECTION, SOLUTION INTRAVENOUS at 17:21

## 2020-11-05 RX ADMIN — SODIUM CHLORIDE 1000 ML: 9 INJECTION, SOLUTION INTRAVENOUS at 15:43

## 2020-11-05 RX ADMIN — IOPAMIDOL 67 ML: 755 INJECTION, SOLUTION INTRAVENOUS at 17:17

## 2020-11-05 RX ADMIN — HYDROMORPHONE HYDROCHLORIDE 0.2 MG: 0.2 INJECTION, SOLUTION INTRAMUSCULAR; INTRAVENOUS; SUBCUTANEOUS at 17:54

## 2020-11-05 ASSESSMENT — ENCOUNTER SYMPTOMS
BACK PAIN: 1
NAUSEA: 1
DIFFICULTY URINATING: 0
MYALGIAS: 0
ABDOMINAL PAIN: 0
HEADACHES: 0
SHORTNESS OF BREATH: 0
CONFUSION: 0
ARTHRALGIAS: 0
VOMITING: 1
EYE REDNESS: 0
FEVER: 0
NECK PAIN: 0
NECK STIFFNESS: 0
JOINT SWELLING: 0
COLOR CHANGE: 0

## 2020-11-05 ASSESSMENT — MIFFLIN-ST. JEOR: SCORE: 1168.64

## 2020-11-05 NOTE — ED NOTES
I took signout of the patient from Dr. Romeo. This is a 65 year old female with R lower back/flank pain. UA shows possible UTI. CT abdomen pelvis shows nonspecific trace free fluid in the pelvis.  I discussed all results with patient.  Urine will be sent for culture.  Patient states she has no history of UTIs.  We will provide a course of antibiotics if the urine culture is positive patient can take this.  Will discharge with pain control as well as nausea control.  Discussed that this point we do not know the cause of patient's symptoms and to monitor them carefully.  Patient is feeling considerably better in the emergency department.  Will discharge home with return precautions. Discussed reasons to return to the emergency department.  Patient understands and agrees with this plan.      Domo Castillo, DO  11/06/20 0122

## 2020-11-05 NOTE — ED NOTES
Pt states she no longer wants to wait for MD joel. Ambulatory, oriented, even unlabored respirations. Instructed to return as needed.

## 2020-11-05 NOTE — ED AVS SNAPSHOT
[facility] Emergency Department  [address]  [place] [place] [place] [address]  Phone: [phone]  Fax: [phone]                                    Leydi Zaragoza   MRN: [id number]    Department: [facility] Emergency Department   Date of Visit: 11/5/2020           After Visit Summary Signature Page    I have received my discharge instructions, and my questions have been answered. I have discussed any challenges I see with this plan with the nurse or doctor.    ..........................................................................................................................................  Patient/Patient Representative Signature      ..........................................................................................................................................  Patient Representative Print Name and Relationship to Patient    ..................................................               ................................................  Date                                   Time    ..........................................................................................................................................  Reviewed by Signature/Title    ...................................................              ..............................................  Date                                               Time          22EPIC Rev 08/18

## 2020-11-05 NOTE — ED PROVIDER NOTES
[facility] EMERGENCY DEPARTMENT ([facility])     November 5, 2020    History     Chief Complaint   Patient presents with     Back Pain     Groin Pain     The history is provided by the patient and medical records.     Leydi Zaragoza is a 65 year old female with history of endometrioid adenocarcinoma status post hysterectomy and bilateral salpingectomy and oophorectomy in 2013 who presents with right flank pain and right lower quadrant abdominal pain that started yesterday. She states symptoms started yesterday with a twinge of pain in her right flank and has been progressively worsening. She laid down and tried to find position of comfort but was unable to. The pain is waxing and waning, sharp, radiates from right flank and straight through into her right lower groin. No bulging in her left inguinal area. No radiation down her leg. She states the slightest movement makes her more nauseated. She notes having gastro issues with symptoms improving with laying on left side, but this hasn't been helping. She notes prior CT abdomen showing that her appendix is not in the right position, is far into her pelvis. She was concerned this is her appendix. Standing is the best position for her, and sitting is the worst. She took 1 ibuprofen without relief, upped this to 2 tablets but it didn't seem to take this away. She has not had these symptoms before. She feels a little nauseated at this time. She last had dry heaves at 6am today. No history of kidney stones. No rashes. No difficulty walking. She denies prior history of sciatica. She had 2 episodes of nausea with dry heaves, last dry heaves were at 9am. She denies nausea at this time but has poor appetite. No other abdominal surgery other than hysterectomy. Last bowel movement was this morning. No other symptoms, no cough, rhinorrhea, sore throat, chest pain, shortness of breath, nausea, vomiting, diarrhea.       PAST MEDICAL HISTORY:   Past Medical History:    Diagnosis Date     Acute bronchospasm     one year but resolved - exercise induced.     Cancer (H) 2013    endometrioid andenocarcinoma     Uterine polyp        PAST SURGICAL HISTORY:   Past Surgical History:   Procedure Laterality Date     COLONOSCOPY  10/11/11     DILATION AND CURETTAGE, OPERATIVE HYSTEROSCOPY WITH MORCELLATOR, COMBINED  6/20/2013    Procedure: COMBINED DILATION AND CURETTAGE, OPERATIVE HYSTEROSCOPY WITH MORCELLATOR;  Dilation And Curettage, Operative Hysteroscopy With Myosure, Morcellation of endometrial polyps.;  Surgeon: Jaylene Jacobson MD;  Location: [facility] OR     HC TOOTH EXTRACTION W/FORCEP       LAPAROSCOPIC HYSTERECTOMY TOTAL, BILATERAL SALPINGO-OOPHORECTOMY, NODE DISSECTION, COMBINED  7/1/2013    Procedure: COMBINED LAPAROSCOPIC HYSTERECTOMY TOTAL, SALPINGO-OOPHORECTOMY, NODE DISSECTION;  Total Laparoscopic Hysterectomy, Bilateral Salpingo Oophorectomy, Cystoscopy;  Surgeon: Jaelyn Khan MD;  Location: [facility] OR       Past medical history, past surgical history, medications, and allergies were reviewed with the patient. Additional pertinent items: None    FAMILY HISTORY:   Family History   Problem Relation Age of Onset     C.A.D. Mother         stents at age 78, passed away 1/30/10     Diabetes Mother      Hypertension Mother      Heart Disease Mother         Uses heart medication for Tachycardia, has had stent placement 2 at once     Arthritis Mother      Gastrointestinal Disease Mother         bowel obstruction - passed away age 86     Hypertension Father      Asthma Father      Heart Disease Father      Neurologic Disorder Daughter         astrocytoma     Connective Tissue Disorder Daughter         jra, in remission, melonoma     Cancer Daughter         melanoma     Other - See Comments Daughter         atypical ductal hyperplasia     Cancer Daughter      Cancer Daughter      Diabetes Brother      Hypertension Brother        SOCIAL HISTORY:   Social History     Tobacco Use      Smoking status: Never Smoker     Smokeless tobacco: Never Used   Substance Use Topics     Alcohol use: Yes     Alcohol/week: 0.0 standard drinks     Comment: Rare, 1 per week     Social history was reviewed with the patient. Additional pertinent items: None      Patient's Medications   New Prescriptions    ONDANSETRON (ZOFRAN ODT) 4 MG ODT TAB    Take 1 tablet (4 mg) by mouth every 8 hours as needed    TRAMADOL (ULTRAM) 50 MG TABLET    Take 1-2 tablets ( mg) by mouth every 6 hours as needed for pain   Previous Medications    CALCIUM CARBONATE-VITAMIN D (CALCIUM 500 + D PO)    Take by mouth 2 times daily   Modified Medications    No medications on file   Discontinued Medications    No medications on file          Allergies   Allergen Reactions     Amoxicillin      rash        Review of Systems   Constitutional: Negative for fever.   HENT: Negative for congestion.    Eyes: Negative for redness.   Respiratory: Negative for shortness of breath.    Cardiovascular: Negative for chest pain.   Gastrointestinal: Positive for nausea and vomiting. Negative for abdominal pain.   Genitourinary: Negative for difficulty urinating.   Musculoskeletal: Positive for back pain. Negative for arthralgias, gait problem, joint swelling, myalgias, neck pain and neck stiffness.   Skin: Negative for color change.   Neurological: Negative for headaches.   Psychiatric/Behavioral: Negative for confusion.     A complete review of systems was performed with pertinent positives and negatives noted in the HPI, and all other systems negative.    Physical Exam   BP: (!) 151/61  Pulse: 97  Temp: 96.4  F (35.8  C)  Resp: 18  SpO2: 97 %      Physical Exam  Constitutional:       General: She is not in acute distress.     Appearance: She is not diaphoretic.   HENT:      Head: Atraumatic.      Mouth/Throat:      Pharynx: No oropharyngeal exudate.   Eyes:      General: No scleral icterus.     Pupils: Pupils are equal, round, and reactive to light.    Cardiovascular:      Heart sounds: Normal heart sounds.   Pulmonary:      Effort: No respiratory distress.      Breath sounds: Normal breath sounds.   Abdominal:      General: Bowel sounds are normal.      Palpations: Abdomen is soft.      Tenderness: There is no abdominal tenderness.   Musculoskeletal:      Lumbar back: She exhibits decreased range of motion, tenderness, bony tenderness ( Over her SI joint) and pain. She exhibits no swelling, no edema, no deformity, no laceration, no spasm and normal pulse.   Skin:     General: Skin is warm.      Findings: No rash.         ED Course        Procedures                           Results for orders placed or performed during the hospital encounter of 11/05/20 (from the past 24 hour(s))   Comprehensive metabolic panel   Result Value Ref Range    Sodium 138 133 - 144 mmol/L    Potassium 4.2 3.4 - 5.3 mmol/L    Chloride 103 94 - 109 mmol/L    Carbon Dioxide 28 20 - 32 mmol/L    Anion Gap 7 3 - 14 mmol/L    Glucose 98 70 - 99 mg/dL    Urea Nitrogen 19 7 - 30 mg/dL    Creatinine 0.64 0.52 - 1.04 mg/dL    GFR Estimate >90 >60 mL/min/[1.73_m2]    GFR Estimate If Black >90 >60 mL/min/[1.73_m2]    Calcium 9.1 8.5 - 10.1 mg/dL    Bilirubin Total 0.6 0.2 - 1.3 mg/dL    Albumin 4.4 3.4 - 5.0 g/dL    Protein Total 8.2 6.8 - 8.8 g/dL    Alkaline Phosphatase 65 40 - 150 U/L    ALT 27 0 - 50 U/L    AST 19 0 - 45 U/L   CBC with platelets differential   Result Value Ref Range    WBC 9.7 4.0 - 11.0 10e9/L    RBC Count 4.66 3.8 - 5.2 10e12/L    Hemoglobin 13.7 11.7 - 15.7 g/dL    Hematocrit 42.2 35.0 - 47.0 %    MCV 91 78 - 100 fl    MCH 29.4 26.5 - 33.0 pg    MCHC 32.5 31.5 - 36.5 g/dL    RDW 12.6 10.0 - 15.0 %    Platelet Count 306 150 - 450 10e9/L    Diff Method Automated Method     % Neutrophils 64.9 %    % Lymphocytes 25.1 %    % Monocytes 8.2 %    % Eosinophils 1.2 %    % Basophils 0.3 %    % Immature Granulocytes 0.3 %    Nucleated RBCs 0 0 /100    Absolute Neutrophil 6.3 1.6 -  8.3 10e9/L    Absolute Lymphocytes 2.4 0.8 - 5.3 10e9/L    Absolute Monocytes 0.8 0.0 - 1.3 10e9/L    Absolute Eosinophils 0.1 0.0 - 0.7 10e9/L    Absolute Basophils 0.0 0.0 - 0.2 10e9/L    Abs Immature Granulocytes 0.0 0 - 0.4 10e9/L    Absolute Nucleated RBC 0.0      Medications   0.9% sodium chloride BOLUS (1,000 mLs Intravenous New Bag 11/5/20 5143)   HYDROmorphone (DILAUDID) injection 0.2 mg (has no administration in time range)   ondansetron (ZOFRAN) injection 4 mg (4 mg Intravenous Given 11/5/20 8234)   ketorolac (TORADOL) injection 15 mg (15 mg Intravenous Given 11/5/20 1543)             Assessments & Plan (with Medical Decision Making)   65-year-old female who presents for evaluation of right lower back/flank pain radiating to right lower quadrant.  Differential included sciatica, herniated disc, zoster, internal hernia, renal colic, pyelonephritis.  Exam revealed tenderness without swelling, erythema or rash over her SI joint.  As well, patient manifested positive straight leg raising test.  Laboratories initially revealed normal CBC and comprehensive metabolic panel.  Urinalysis and imaging was pending.  Patient was initially treated with Zofran and Toradol without further nausea or vomiting and slight improvement in discomfort.  Patient will be signed out to incoming provider.  Tentative plan for discharge if persistent improvement with Zofran and Ultram to complement ibuprofen.    I have reviewed the nursing notes.    I have reviewed the findings, diagnosis, plan and need for follow up with the patient.    New Prescriptions    ONDANSETRON (ZOFRAN ODT) 4 MG ODT TAB    Take 1 tablet (4 mg) by mouth every 8 hours as needed    TRAMADOL (ULTRAM) 50 MG TABLET    Take 1-2 tablets ( mg) by mouth every 6 hours as needed for pain       Final diagnoses:   Non-intractable vomiting with nausea, unspecified vomiting type   Right flank pain       11/5/2020   [facility] EMERGENCY DEPARTMENT     Agueda  Valdemar MIRELES MD  11/05/20 1427

## 2020-11-06 LAB
BACTERIA SPEC CULT: NORMAL
Lab: NORMAL
SPECIMEN SOURCE: NORMAL

## 2020-11-06 NOTE — RESULT ENCOUNTER NOTE
Emergency Dept/Urgent Care discharge antibiotic (if prescribed): Ciprofloxacin (Cipro) 500 mg tablet, 1 tablet (500 mg) by mouth 2 times daily for 3 days.  Date of Rx (if applicable):  11/5/20  No changes in treatment per Urine culture protocol.

## 2020-11-07 NOTE — RESULT ENCOUNTER NOTE
Final urine culture report is NEGATIVE per [place] ED Lab Result protocol.    If NEGATIVE result, no change in treatment, per [place] ED Lab Result protocol.

## 2020-11-09 ENCOUNTER — DOCUMENTATION ONLY (OUTPATIENT)
Dept: CARE COORDINATION | Facility: CLINIC | Age: 65
End: 2020-11-09

## 2020-11-11 ENCOUNTER — OFFICE VISIT (OUTPATIENT)
Dept: FAMILY MEDICINE | Facility: CLINIC | Age: 65
End: 2020-11-11
Payer: COMMERCIAL

## 2020-11-11 VITALS
HEIGHT: 66 IN | SYSTOLIC BLOOD PRESSURE: 155 MMHG | BODY MASS INDEX: 22.02 KG/M2 | WEIGHT: 137 LBS | HEART RATE: 66 BPM | TEMPERATURE: 98.4 F | OXYGEN SATURATION: 97 % | DIASTOLIC BLOOD PRESSURE: 82 MMHG | RESPIRATION RATE: 16 BRPM

## 2020-11-11 DIAGNOSIS — M54.41 ACUTE RIGHT-SIDED LOW BACK PAIN WITH RIGHT-SIDED SCIATICA: Primary | ICD-10-CM

## 2020-11-11 LAB
ALBUMIN UR-MCNC: NEGATIVE MG/DL
APPEARANCE UR: CLEAR
BASOPHILS # BLD AUTO: 0 10E9/L (ref 0–0.2)
BASOPHILS NFR BLD AUTO: 0.2 %
BILIRUB UR QL STRIP: NEGATIVE
COLOR UR AUTO: YELLOW
DIFFERENTIAL METHOD BLD: NORMAL
EOSINOPHIL # BLD AUTO: 0.3 10E9/L (ref 0–0.7)
EOSINOPHIL NFR BLD AUTO: 5.4 %
ERYTHROCYTE [DISTWIDTH] IN BLOOD BY AUTOMATED COUNT: 12.9 % (ref 10–15)
GLUCOSE UR STRIP-MCNC: NEGATIVE MG/DL
HCT VFR BLD AUTO: 40.4 % (ref 35–47)
HGB BLD-MCNC: 12.9 G/DL (ref 11.7–15.7)
HGB UR QL STRIP: NEGATIVE
KETONES UR STRIP-MCNC: 15 MG/DL
LEUKOCYTE ESTERASE UR QL STRIP: NEGATIVE
LYMPHOCYTES # BLD AUTO: 1.7 10E9/L (ref 0.8–5.3)
LYMPHOCYTES NFR BLD AUTO: 29.9 %
MCH RBC QN AUTO: 29.1 PG (ref 26.5–33)
MCHC RBC AUTO-ENTMCNC: 31.9 G/DL (ref 31.5–36.5)
MCV RBC AUTO: 91 FL (ref 78–100)
MONOCYTES # BLD AUTO: 0.5 10E9/L (ref 0–1.3)
MONOCYTES NFR BLD AUTO: 8.2 %
NEUTROPHILS # BLD AUTO: 3.3 10E9/L (ref 1.6–8.3)
NEUTROPHILS NFR BLD AUTO: 56.3 %
NITRATE UR QL: NEGATIVE
PH UR STRIP: 6 PH (ref 5–7)
PLATELET # BLD AUTO: 284 10E9/L (ref 150–450)
RBC # BLD AUTO: 4.44 10E12/L (ref 3.8–5.2)
RBC #/AREA URNS AUTO: ABNORMAL /HPF
SOURCE: ABNORMAL
SP GR UR STRIP: 1.02 (ref 1–1.03)
UROBILINOGEN UR STRIP-ACNC: 0.2 EU/DL (ref 0.2–1)
WBC # BLD AUTO: 5.8 10E9/L (ref 4–11)
WBC #/AREA URNS AUTO: ABNORMAL /HPF

## 2020-11-11 PROCEDURE — 99214 OFFICE O/P EST MOD 30 MIN: CPT | Performed by: FAMILY MEDICINE

## 2020-11-11 PROCEDURE — 36415 COLL VENOUS BLD VENIPUNCTURE: CPT | Performed by: FAMILY MEDICINE

## 2020-11-11 PROCEDURE — 85025 COMPLETE CBC W/AUTO DIFF WBC: CPT | Performed by: FAMILY MEDICINE

## 2020-11-11 PROCEDURE — 81001 URINALYSIS AUTO W/SCOPE: CPT | Performed by: FAMILY MEDICINE

## 2020-11-11 ASSESSMENT — MIFFLIN-ST. JEOR: SCORE: 1173.18

## 2020-11-11 NOTE — PROGRESS NOTES
"Subjective     Leydi Zaragoza is a 65 year old female who presents to clinic today for the following health issues:    HPI         ED/UC Followup:    Facility:  [facility]  Date of visit: 11/5/20  Reason for visit: pain  Current Status: still having pain     Wednesday AM last week - woke up fine but sat on couch and developed right low back pain .  Progressed throughout the day -   Next morning worse - went to emergency room   Pain in right lower SI area and pain into the right groin  Had dry heaves due to pain in ER  Pain comes in waves - two distinct spots and radiates to the right groin -other is the right SI/glut area  Waves of pain on top of low level pain  Alternating tylenol and ibuprofen  aggravating factors - if flexes hip, lying down  Improves if up and moving around  No associated symptoms  Has waves of nausea and decrease appetite    Pain scale - was 8  Now is a 3  Some numbness in the right groin area      Review of Systems   Constitutional, HEENT, cardiovascular, pulmonary, GI, , musculoskeletal, neuro, skin, endocrine and psych systems are negative, except as otherwise noted.      Objective    BP (!) 155/82 (BP Location: Left arm, Cuff Size: Adult Regular)   Pulse 66   Temp 98.4  F (36.9  C) (Oral)   Resp 16   Ht 1.676 m (5' 6\")   Wt 62.1 kg (137 lb)   LMP 09/15/2007   SpO2 97%   BMI 22.11 kg/m    Body mass index is 22.11 kg/m .  Physical Exam   GENERAL: healthy, alert and no distress  MS: back - tenderness over the right SI area and mild in right groin - no abdominal pain, FROM with hip flexion but pain when lifting right leg  SKIN: no suspicious lesions or rashes            Assessment & Plan     Acute right-sided low back pain with right-sided sciatica  Right SI area pain with right groin pain -   Unclear etiology -   Diff dx includes musculoskeletal vs appendix vs kidney stone  In ER had good workup and all negative -   Discussed rechecking UA to r/o RBC since can be missed   Will also " check cBC to make sure no sign of infection.  Will refer for PT  If numbness or pain persist or worsen - could consider getting MRI of lumbar area.  - CBC with platelets and differential  - UA with Microscopic reflex to Culture  - [facility] REFERRAL; Future        Pt will call or RTC if symptoms worsen or do not improve.      No follow-ups on file.    Mary Leonardo DO  [facility]

## 2020-11-12 ENCOUNTER — THERAPY VISIT (OUTPATIENT)
Dept: PHYSICAL THERAPY | Facility: CLINIC | Age: 65
End: 2020-11-12
Payer: COMMERCIAL

## 2020-11-12 DIAGNOSIS — M54.41 ACUTE RIGHT-SIDED LOW BACK PAIN WITH RIGHT-SIDED SCIATICA: ICD-10-CM

## 2020-11-12 DIAGNOSIS — M54.50 RIGHT-SIDED LOW BACK PAIN WITHOUT SCIATICA: ICD-10-CM

## 2020-11-12 DIAGNOSIS — M25.551 HIP PAIN, RIGHT: ICD-10-CM

## 2020-11-12 PROCEDURE — 97161 PT EVAL LOW COMPLEX 20 MIN: CPT | Mod: GP | Performed by: PHYSICAL THERAPIST

## 2020-11-12 PROCEDURE — 97110 THERAPEUTIC EXERCISES: CPT | Mod: GP | Performed by: PHYSICAL THERAPIST

## 2020-11-12 NOTE — PROGRESS NOTES
[facility] Initial Evaluation  Subjective:  Patient presents to PT with right low back pain.  Symptoms began in the morning on 11/4/2020.  They became progressively worse as the day went on and prompted patient to go to ER the next day.  She was experiencing nausea which she relates to the pain.  Work up in ER was negative as was some re-testing at primary doctor yesterday.  Currently patient complains of right PSIS region low back pain and right groin pain.  Symptoms are worse with sitting and better with being up and moving around.  They interfere with sleep.    The history is provided by the patient.   Patient Health History  Leydi Zaragoza being seen for right back pain.     Problem began: 11/4/2020.   Problem occurred: started in the morning and got worse all day     General health as reported by patient is good.  Pertinent medical history includes: cancer. Other medical history details: uterine cancer with hysterectomy 7 years ago.   Red flags:  Pain at rest/night.     Surgeries include:  Cancer surgery. Other surgery history details: hysterectomy.    Current medications:  Anti-inflammatory. Other medications details: ibuprofen and tylenol.    Current occupation is retired.  Play tennis once a week.                                       Objective:  Standing Alignment:        Lumbar deviations alignment: deep lordosis.            Gait:    Gait Type:  Normal   Assistive Devices:  None      Flexibility/Screens:       Lower Extremity:      Decreased right lower extremity flexibility:  Hip Flexors               Lumbar/SI Evaluation  ROM:    AROM Lumbar:   Flexion:          Fingertips to distal LE with R LBP, no worse after reps.  Right lbp with KTC  Ext:                    No complaint, right low back pain with reps.  R LBP with press ups.   Side Bend:        Left:     Right:   Rotation:           Left:     Right:   Side Glide:        Left:  No change    Right:  Right lbp          Lumbar  Myotomes:  normal            Lumbar DTR's:  normal        Lumbar Dermtomes:  normal                Neural Tension/Mobility:      Left side:SLR w/DF  negative.   Right side:   Slump positive.  Right side:   SLR w/DF  negative.   Lumbar Palpation:      Tenderness present at Right: Piriformis and PSIS        SI joint/Sacrum:        Forward bend standing left: negative thigh thrust, compression, distraction and gaeslyn's.                                               Hip ROM WNL. Negative fadir, positive noris. + resisted right hip abd.    General     ROS    Assessment/Plan:    Patient is a 65 year old female with lumbar complaints.    Patient has the following significant findings with corresponding treatment plan.                Diagnosis 1:  Right low back/groin pain   Pain -  manual therapy, self management, education and home program  Decreased function - therapeutic activities      Therapy Evaluation Codes:   1) History comprised of:   Personal factors that impact the plan of care:      None.    Comorbidity factors that impact the plan of care are:      None.     Medications impacting care: None.  2) Examination of Body Systems comprised of:   Body structures and functions that impact the plan of care:      Hip, Lumbar spine and Sacral illiac joint.   Activity limitations that impact the plan of care are:      Sitting and Sleeping.  3) Clinical presentation characteristics are:   Stable/Uncomplicated.  4) Decision-Making    Low complexity using standardized patient assessment instrument and/or measureable assessment of functional outcome.  Cumulative Therapy Evaluation is: Low complexity.    Previous and current functional limitations:  (See Goal Flow Sheet for this information)    Short term and Long term goals: (See Goal Flow Sheet for this information)     Communication ability:  Patient appears to be able to clearly communicate and understand verbal and written communication and follow directions  correctly.  Treatment Explanation - The following has been discussed with the patient:   RX ordered/plan of care  Anticipated outcomes  Possible risks and side effects  This patient would benefit from PT intervention to resume normal activities.   Rehab potential is good.    Frequency:  1 X week, once daily  Duration:  for 8 weeks  Discharge Plan:  Achieve all LTG.  Independent in home treatment program.  Reach maximal therapeutic benefit.    Please refer to the daily flowsheet for treatment today, total treatment time and time spent performing 1:1 timed codes.

## 2020-11-12 NOTE — PROGRESS NOTES
[facility] Initial Evaluation  Subjective:    Patient Health History             Pertinent medical history includes: cancer.   Red flags:  Pain at rest/night.     Surgeries include:  Cancer surgery (Cancer hysterectomy).    Current medications:  Pain medication.    Current occupation is Retired.   Primary job tasks: .                                    Objective:  System    Physical Exam    General     ROS    Assessment/Plan:

## 2020-11-19 ENCOUNTER — THERAPY VISIT (OUTPATIENT)
Dept: PHYSICAL THERAPY | Facility: CLINIC | Age: 65
End: 2020-11-19
Payer: COMMERCIAL

## 2020-11-19 DIAGNOSIS — M54.50 RIGHT-SIDED LOW BACK PAIN WITHOUT SCIATICA: ICD-10-CM

## 2020-11-19 DIAGNOSIS — M25.551 HIP PAIN, RIGHT: ICD-10-CM

## 2020-11-19 PROCEDURE — 97140 MANUAL THERAPY 1/> REGIONS: CPT | Mod: GP | Performed by: PHYSICAL THERAPIST

## 2020-11-19 PROCEDURE — 97110 THERAPEUTIC EXERCISES: CPT | Mod: GP | Performed by: PHYSICAL THERAPIST

## 2020-11-30 ENCOUNTER — THERAPY VISIT (OUTPATIENT)
Dept: PHYSICAL THERAPY | Facility: CLINIC | Age: 65
End: 2020-11-30
Payer: COMMERCIAL

## 2020-11-30 DIAGNOSIS — M25.551 HIP PAIN, RIGHT: ICD-10-CM

## 2020-11-30 DIAGNOSIS — M54.50 RIGHT-SIDED LOW BACK PAIN WITHOUT SCIATICA: ICD-10-CM

## 2020-11-30 PROCEDURE — 97110 THERAPEUTIC EXERCISES: CPT | Mod: GP | Performed by: PHYSICAL THERAPIST

## 2020-11-30 PROCEDURE — 97140 MANUAL THERAPY 1/> REGIONS: CPT | Mod: GP | Performed by: PHYSICAL THERAPIST

## 2020-12-01 NOTE — PROGRESS NOTES
DISCHARGE REPORT    Progress reporting period is from 11/12/2020 to 11/3030.  Leydi has been seen in PT 3 times for treatment of LBP.  Treatment has included manual therapy and HEP.    SUBJECTIVE   Subjective: Much better.  No groin pain.  Hit tennis balls 11/29 and 11/27 for 45 min without sx.  Currently no LBP     Current Pain level: 0/10.     } Initial Pain level: 9/10.   Changes in function:  Yes (See Goal flowsheet attached for changes in current functional level)  Adverse reaction to treatment or activity: None    OBJECTIVE  Changes noted in objective findings:    Objective: Mild right LBP with SG right other wise lumbar movement testing is negative.  Hip noris and fadir and negaitive.  SI provocation is negative     ASSESSMENT/PLAN    STG/LTGs have been met or progress has been made towards goals:  Yes (See Goal flow sheet completed today.)  Assessment of Progress: The patient's condition is improving.  Self Management Plans:  Patient is independent in a home treatment program.    Leydi continues to require the following intervention to meet STG and LTG's:  PT intervention is no longer required to meet STG/LTG.    Recommendations:  This patient is ready to be discharged from therapy and continue their home treatment program.    Please refer to the daily flowsheet for treatment today, total treatment time and time spent performing 1:1 timed codes.

## 2021-01-14 ENCOUNTER — MYC MEDICAL ADVICE (OUTPATIENT)
Dept: FAMILY MEDICINE | Facility: CLINIC | Age: 66
End: 2021-01-14

## 2021-01-15 ENCOUNTER — HEALTH MAINTENANCE LETTER (OUTPATIENT)
Age: 66
End: 2021-01-15

## 2021-02-23 NOTE — PROGRESS NOTES
AUDIOLOGY REPORT    SUBJECTIVE:  Leydi Zaragoza is a 65 year old female who was seen on 3/4/2021  in the Audiology Clinic at the [facility] for audiologic evaluation, ordered by Mary Leonardo DO.  The patient has been seen previously in this clinic on 4/13/2016 for assessment and results indicated normal hearing in the right ear and normal/borderline normal hearing in the left ear.  The patient reports difficulty hearing in noise and difficulty understanding her grandchildren.  The patient denies ear pain, drainage from the ears, tinnitus, dizziness, history of ear surgery and history of noise exposure.      OBJECTIVE:  Abuse Screening:  Do you feel unsafe at home or work/school? No  Do you feel threatened by someone? No  Does anyone try to keep you from having contact with others, or doing things outside of your home? No  Physical signs of abuse present? No     Fall Risk Screen:  1. Have you fallen two or more times in the past year? No  2. Have you fallen and had an injury in the past year? No    Timed Up and Go Score (in seconds): not tested  Is patient a fall risk? No  Referral initiated: No  Fall Risk Assessment Completed by Audiology    Otoscopic exam indicates ears are clear of cerumen bilaterally.     Pure Tone Thresholds assessed using conventional audiometry with good  reliability from 250-8000 Hz bilaterally using circumaural headphones and rechecked with insert earphones.     RIGHT: Normal/borderline normal hearing    LEFT: Normal/borderline normal hearing    Tympanogram:    RIGHT: normal eardrum mobility    LEFT:   normal eardrum mobility    Reflexes (reported by stimulus ear):  RIGHT: Ipsilateral is present at normal levels  RIGHT: Contralateral is present at normal levels  LEFT:   Ipsilateral is present at normal levels  LEFT:   Contralateral is present at normal levels      Speech Reception Threshold:    RIGHT: 10 dB HL    LEFT:   10 dB HL  Word  Recognition Score:     RIGHT: 100% at 50 dB HL using NU-6 recorded word list.    LEFT:   100% at 50 dB HL using NU-6 recorded word list.      ASSESSMENT:   Normal/borderline normal hearing bilaterally  Normal middle ear function    Compared to patient's previous audiogram dated 4/13/2016, hearing in the right ear has decreased 10-15 dB from 9102-0752 Hz.  Hearing in the left ear has decreased 10 dB at 1000 Hz and from 1525-2919 Hz.  Hearing in the left ear improved 15 dB at 6000 Hz.      Today s results were discussed with the patient in detail.  While hearing has decreased, it is still within the normal/borderline normal range.      PLAN:    1. Recheck hearing in 1-2 years or sooner if changes are noted.    2. Follow up with Mary Leonardo DO regarding today's results and recommendations.    The patient expressed understanding and agreement with this plan.    Sari Riddle, CCC-A, [facility]  Licensed Audiologist  [place] #[id number]    Enclosure: audiogram    Cc Mary Leonardo DO

## 2021-03-04 ENCOUNTER — OFFICE VISIT (OUTPATIENT)
Dept: AUDIOLOGY | Facility: CLINIC | Age: 66
End: 2021-03-04
Attending: FAMILY MEDICINE
Payer: COMMERCIAL

## 2021-03-04 ENCOUNTER — OFFICE VISIT (OUTPATIENT)
Dept: DERMATOLOGY | Facility: CLINIC | Age: 66
End: 2021-03-04
Payer: COMMERCIAL

## 2021-03-04 DIAGNOSIS — D18.01 CHERRY ANGIOMA: ICD-10-CM

## 2021-03-04 DIAGNOSIS — L81.6 POIKILODERMA: ICD-10-CM

## 2021-03-04 DIAGNOSIS — H90.6 MIXED HEARING LOSS, BILATERAL: ICD-10-CM

## 2021-03-04 DIAGNOSIS — H91.90 DIFFICULTY HEARING: Primary | ICD-10-CM

## 2021-03-04 DIAGNOSIS — L82.1 SEBORRHEIC KERATOSIS: ICD-10-CM

## 2021-03-04 DIAGNOSIS — D48.5 NEOPLASM OF UNCERTAIN BEHAVIOR OF SKIN: Primary | ICD-10-CM

## 2021-03-04 DIAGNOSIS — D23.9 DERMATOFIBROMA: ICD-10-CM

## 2021-03-04 PROCEDURE — 99203 OFFICE O/P NEW LOW 30 MIN: CPT | Mod: 25 | Performed by: STUDENT IN AN ORGANIZED HEALTH CARE EDUCATION/TRAINING PROGRAM

## 2021-03-04 PROCEDURE — 92557 COMPREHENSIVE HEARING TEST: CPT | Performed by: AUDIOLOGIST-HEARING AID FITTER

## 2021-03-04 PROCEDURE — 92550 TYMPANOMETRY & REFLEX THRESH: CPT | Performed by: AUDIOLOGIST-HEARING AID FITTER

## 2021-03-04 PROCEDURE — 88305 TISSUE EXAM BY PATHOLOGIST: CPT | Performed by: DERMATOLOGY

## 2021-03-04 PROCEDURE — 11102 TANGNTL BX SKIN SINGLE LES: CPT | Mod: GC | Performed by: STUDENT IN AN ORGANIZED HEALTH CARE EDUCATION/TRAINING PROGRAM

## 2021-03-04 ASSESSMENT — PAIN SCALES - GENERAL: PAINLEVEL: NO PAIN (0)

## 2021-03-04 NOTE — NURSING NOTE
Dermatology Rooming Note    Leydi LEONARDO Zaragoza's goals for this visit include:   Chief Complaint   Patient presents with     Skin Check     Leydi is here today for a skin check. She is concerned about a spot on her chest.     Mela Harper, CMA

## 2021-03-04 NOTE — LETTER
3/4/2021       RE: Leydi Zaragoza  [address]  [facility] [address]     Dear Colleague,    Thank you for referring your patient, Leydi Zaragoza, to the [facility] [place] [facility]. Please see a copy of my visit note below.    [facility] Dermatology Note  Encounter Date: Mar 4, 2021  Office Visit     Dermatology Problem List:  FBSE 3/4/21  1. Poikiloderma  2. Sks  3. dermaofibroma  4. Cherry angioma  5. History of DN, >10 years ago, right arm without pigment recurrence  6. Family history of melanoma, daughter (40s), did not require SLNB  7. NUB, left chest, s/p shave biopsy 3/4/21    ____________________________________________    Assessment & Plan:     # NUB, left chest  - Shave biopsy performed today (see procedure note(s) below).     # sun damage, history of DN   Sun precaution was advised including the use of sun screens of SPF 30 or higher, sun protective clothing, and avoidance of tanning beds.    # SKs, cherry angiomas, dermatofibroma  -Benign nature discussed and patient reassured  - No further intervention, patient to report changes     # Poikiloderma  - Sun precaution was advised including the use of sun screens of SPF 30 or higher, sun protective clothing, and avoidance of tanning beds.  - offered cosmetics referral (declined)      Procedures Performed:   - Shave biopsy procedure note, location(s): left chest. After discussion of benefits and risks including but not limited to bleeding, infection, scar, incomplete removal, recurrence, and non-diagnostic biopsy, written consent and photographs were obtained. The area was cleaned with isopropyl alcohol. 0.5mL of 1% lidocaine with epinephrine was injected to obtain adequate anesthesia of lesion(s). Shave biopsy at site(s) performed. Hemostasis was achieved with aluminium chloride. Petrolatum ointment and a sterile dressing were applied. The  patient tolerated the procedure and no complications were noted. The patient was provided with verbal and written post care instructions.       Follow-up: 1 year(s) in-person, or earlier for new or changing lesions    Staff and Resident:     Krupa Ball  PGY-4 Dermatology Resident  [facility] Department of Dermatology     I have personally examined this patient and agree with Dr. ball's documentation and plan of care. I have reviewed and amended the resident's note above. The documentation accurately reflects my clinical observations, diagnoses, treatment and follow-up plans.     Izzy Clark MD  Dermatology Staff  ____________________________________________    CC: Skin Check (Leydi is here today for a skin check. She is concerned about a spot on her chest.)    HPI:  Ms. Leydi Zaragoza is a(n) 65 year old female who presents today as a new patient for skin check. She states she was seeing Dr. Barnes previously. She is concerned about some scaling on the chest. She has a daughter who had melanoma about 10 years ago in her 40s. She has never personally had a skin cancer. She does have a history of a dysplastic nevus removed about 10 years ago. She has no bleeding areas on the skin, nothing changing or growing rapidly, or otherwise significantly bothersome. The patient is well today in their baseline state of health, with no additional skin concerns.     Patient is otherwise feeling well, without additional skin concerns.    Labs Reviewed:  N/A    Physical Exam:  Vitals: [facility] 09/15/2007   SKIN: Total skin excluding the undergarment areas was performed. The exam included the head/face, neck, both arms, chest, back, abdomen, both legs, digits and/or nails.   - stuck on brown papules and plaqeus on trunk and extremities and on right vulva  - multiple dome shaped cherry red papules  - hyper and hypopigmented macules on the chest without significant atrophy  - shiny pink papule with atypical vessels  on left chest  - brown papule with dimple on lateral pressure on shin        - No other lesions of concern on areas examined.     Medications:  Current Outpatient Medications   Medication     Calcium Carbonate-Vitamin D (CALCIUM 500 + D PO)     No current facility-administered medications for this visit.       Past Medical History:   Patient Active Problem List   Diagnosis     Mucous polyp of cervix     CARDIOVASCULAR SCREENING; LDL GOAL LESS THAN 160     Advanced directives, counseling/discussion     Adenomatous colon polyp     Dysplastic nevus     Endometrial/uterine adenocarcinoma (H)     Thyroid nodule     Pulmonary nodule     Past Medical History:   Diagnosis Date     Acute bronchospasm     one year but resolved - exercise induced.     Cancer (H) 2013    endometrioid andenocarcinoma     Uterine polyp        CC Mary Leonardo,   [address] [facility]  [address]  [place] [address] on close of this encounter.

## 2021-03-04 NOTE — NURSING NOTE
Lidocaine-epinephrine 1-1:221978 % injection   1.5mL once for one use, starting 3/4/2021 ending 3/4/2021,  2mL disp, R-0, injection  Injected by Dr. Covington

## 2021-03-04 NOTE — PATIENT INSTRUCTIONS

## 2021-03-05 NOTE — PROGRESS NOTES
[facility] Dermatology Note  Encounter Date: Mar 4, 2021  Office Visit     Dermatology Problem List:  FBSE 3/4/21  1. Poikiloderma  2. Sks  3. dermaofibroma  4. Cherry angioma  5. History of DN, >10 years ago, right arm without pigment recurrence  6. Family history of melanoma, daughter (40s), did not require SLNB  7. NUB, left chest, s/p shave biopsy 3/4/21    ____________________________________________    Assessment & Plan:     # NUB, left chest  - Shave biopsy performed today (see procedure note(s) below).     # sun damage, history of DN   Sun precaution was advised including the use of sun screens of SPF 30 or higher, sun protective clothing, and avoidance of tanning beds.    # SKs, cherry angiomas, dermatofibroma  -Benign nature discussed and patient reassured  - No further intervention, patient to report changes     # Poikiloderma  - Sun precaution was advised including the use of sun screens of SPF 30 or higher, sun protective clothing, and avoidance of tanning beds.  - offered cosmetics referral (declined)      Procedures Performed:   - Shave biopsy procedure note, location(s): left chest. After discussion of benefits and risks including but not limited to bleeding, infection, scar, incomplete removal, recurrence, and non-diagnostic biopsy, written consent and photographs were obtained. The area was cleaned with isopropyl alcohol. 0.5mL of 1% lidocaine with epinephrine was injected to obtain adequate anesthesia of lesion(s). Shave biopsy at site(s) performed. Hemostasis was achieved with aluminium chloride. Petrolatum ointment and a sterile dressing were applied. The patient tolerated the procedure and no complications were noted. The patient was provided with verbal and written post care instructions.       Follow-up: 1 year(s) in-person, or earlier for new or changing lesions    Staff and Resident:     Krupa Covington  PGY-4 Dermatology Resident  [facility] Department of  Dermatology     I have personally examined this patient and agree with Dr. ball's documentation and plan of care. I have reviewed and amended the resident's note above. The documentation accurately reflects my clinical observations, diagnoses, treatment and follow-up plans.     Izzy Clark MD  Dermatology Staff  ____________________________________________    CC: Skin Check (Leydi is here today for a skin check. She is concerned about a spot on her chest.)    HPI:  Ms. Leydi Zaragoza is a(n) 65 year old female who presents today as a new patient for skin check. She states she was seeing Dr. Barnes previously. She is concerned about some scaling on the chest. She has a daughter who had melanoma about 10 years ago in her 40s. She has never personally had a skin cancer. She does have a history of a dysplastic nevus removed about 10 years ago. She has no bleeding areas on the skin, nothing changing or growing rapidly, or otherwise significantly bothersome. The patient is well today in their baseline state of health, with no additional skin concerns.     Patient is otherwise feeling well, without additional skin concerns.    Labs Reviewed:  N/A    Physical Exam:  Vitals: [facility] 09/15/2007   SKIN: Total skin excluding the undergarment areas was performed. The exam included the head/face, neck, both arms, chest, back, abdomen, both legs, digits and/or nails.   - stuck on brown papules and plaqeus on trunk and extremities and on right vulva  - multiple dome shaped cherry red papules  - hyper and hypopigmented macules on the chest without significant atrophy  - shiny pink papule with atypical vessels on left chest  - brown papule with dimple on lateral pressure on shin        - No other lesions of concern on areas examined.     Medications:  Current Outpatient Medications   Medication     Calcium Carbonate-Vitamin D (CALCIUM 500 + D PO)     No current facility-administered medications for this visit.       Past Medical  History:   Patient Active Problem List   Diagnosis     Mucous polyp of cervix     CARDIOVASCULAR SCREENING; LDL GOAL LESS THAN 160     Advanced directives, counseling/discussion     Adenomatous colon polyp     Dysplastic nevus     Endometrial/uterine adenocarcinoma (H)     Thyroid nodule     Pulmonary nodule     Past Medical History:   Diagnosis Date     Acute bronchospasm     one year but resolved - exercise induced.     Cancer (H) 2013    endometrioid andenocarcinoma     Uterine polyp        CC Mary Leonardo DO  [address] [id number] on close of this encounter.

## 2021-03-08 LAB — COPATH REPORT: NORMAL

## 2021-03-09 NOTE — RESULT ENCOUNTER NOTE
Called patient with results of biopsy and answered all questions. Patient scheduled for excision this week.     Krupa Covington  PGY-4 Dermatology Resident  [facility] Department of Dermatology

## 2021-03-11 ENCOUNTER — OFFICE VISIT (OUTPATIENT)
Dept: DERMATOLOGY | Facility: CLINIC | Age: 66
End: 2021-03-11
Payer: COMMERCIAL

## 2021-03-11 VITALS — HEART RATE: 74 BPM | DIASTOLIC BLOOD PRESSURE: 78 MMHG | SYSTOLIC BLOOD PRESSURE: 162 MMHG

## 2021-03-11 DIAGNOSIS — D48.5 NEOPLASM OF UNCERTAIN BEHAVIOR OF SKIN: ICD-10-CM

## 2021-03-11 DIAGNOSIS — C44.519 BASAL CELL CARCINOMA (BCC) OF ANTERIOR CHEST: Primary | ICD-10-CM

## 2021-03-11 PROCEDURE — 88305 TISSUE EXAM BY PATHOLOGIST: CPT | Performed by: DERMATOLOGY

## 2021-03-11 PROCEDURE — 13101 CMPLX RPR TRUNK 2.6-7.5 CM: CPT | Performed by: DERMATOLOGY

## 2021-03-11 PROCEDURE — 11602 EXC TR-EXT MAL+MARG 1.1-2 CM: CPT | Performed by: DERMATOLOGY

## 2021-03-11 ASSESSMENT — PAIN SCALES - GENERAL: PAINLEVEL: NO PAIN (0)

## 2021-03-11 NOTE — PROGRESS NOTES
DERMATOLOGIC EXCISION SURGERY PROCEDURE NOTE   Dermatology Problem List:  BCC L chest excised 3/11/21      NAME OF PROCEDURE: Excision with complex linear closure  Staff surgeon: Fransisco  Resident: none  Scrub nurse: Alexys    PRE-OPERATIVE DIAGNOSIS:  Basal Cell Carcinoma  POST-OPERATIVE DIAGNOSIS: Same   LOCATION: L chest  FINAL EXCISION SIZE(DEFECT SIZE): 1.6 cm  MARGIN: 0.4 cm  FINAL REPAIR LENGTH: 5 cm   ANESTHESIA: 9 1% lidocaine with 1:100,000 epinephrine    INDICATIONS: This patient presented with a 0.8cm Basal Cell Carcinoma.. Excision was indicated.   We discussed the principles of treatment and most likely complications including bleeding, infection, scarring, alteration in skin color and sensation, wound dehiscence,muscle weakness in the area, or recurrence of the lesion or disease. We reviewed that on occasion, after healing, a secondary procedure or revision may be recommended in order to obtain the best cosmetic or functional result.   PROCEDURE: The patient was taken to the operative suite. Time-out was performed. The treatment area was anesthetized. The area was washed with Hibiclens, rinsed with saline and draped with sterile towels. The lesion was delineated and excised down to deep subcutaneous fat in a fusiform manner. Hemostasis was obtained by electrocoagulation.     REPAIR: A complex layered linear closure was selected as the procedure which would maximally preserve both function and cosmesis and for the following reasons: 1) the defect was widely undermined; 2) multiple deep plication and layered sutures placed; 3) wound size, depth, tension, and location.   After the excision of the tumor, the area was extensively and carefully undermined using blunt Metzenbaum scissors. Hemostasis was obtained with spot electrocautery and ligation of vessels where necessary. An initial deep plication sutures of 4.0 monocryl sutures  were placed in the deep, subcutaneous and fascial planes to appose the lateral  margins.  The subcutaneous and dermal layers were then closed with additional 4.0 monocryl sutures. The epidermis was then carefully approximated along the length of the wound using running subcuticular sutures.    Estimated blood loss was less than 10 ml for all surgical sites. A sterile pressure dressing was applied and wound care instructions, with a written handout, were given. The [facility] alert and ambulatory.   The patient elected for pathology results to automatically release and understands that the clinical staff will contact them as soon as possible to notify them of the results.   FU for suture removal in n/a and in dermatology clinic with MD in PRN.        Dr. Moody staffed the patient and was present for the key portions of the above procedure.     Staff Involved:  Staff Only    Godfrey Moody MD

## 2021-03-11 NOTE — PATIENT INSTRUCTIONS

## 2021-03-11 NOTE — LETTER
3/11/2021       RE: Ledyi Zaragoza  [address]  [facility] [address]     Dear Colleague,    Thank you for referring your patient, Leydi Zaragoza, to the [facility] [place] at [facility]. Please see a copy of my visit note below.    DERMATOLOGIC EXCISION SURGERY PROCEDURE NOTE   Dermatology Problem List:  BCC L chest excised 3/11/21      NAME OF PROCEDURE: Excision with complex linear closure  Staff surgeon: Fransisco  Resident: none  Scrub nurse: Alexys    PRE-OPERATIVE DIAGNOSIS:  Basal Cell Carcinoma  POST-OPERATIVE DIAGNOSIS: Same   LOCATION: L chest  FINAL EXCISION SIZE(DEFECT SIZE): 1.6 cm  MARGIN: 0.4 cm  FINAL REPAIR LENGTH: 5 cm   ANESTHESIA: 9 1% lidocaine with 1:100,000 epinephrine    INDICATIONS: This patient presented with a 0.8cm Basal Cell Carcinoma.. Excision was indicated.   We discussed the principles of treatment and most likely complications including bleeding, infection, scarring, alteration in skin color and sensation, wound dehiscence,muscle weakness in the area, or recurrence of the lesion or disease. We reviewed that on occasion, after healing, a secondary procedure or revision may be recommended in order to obtain the best cosmetic or functional result.   PROCEDURE: The patient was taken to the operative suite. Time-out was performed. The treatment area was anesthetized. The area was washed with Hibiclens, rinsed with saline and draped with sterile towels. The lesion was delineated and excised down to deep subcutaneous fat in a fusiform manner. Hemostasis was obtained by electrocoagulation.     REPAIR: A complex layered linear closure was selected as the procedure which would maximally preserve both function and cosmesis and for the following reasons: 1) the defect was widely undermined; 2) multiple deep plication and layered sutures placed; 3) wound size, depth, tension, and location.   After the  excision of the tumor, the area was extensively and carefully undermined using blunt Metzenbaum scissors. Hemostasis was obtained with spot electrocautery and ligation of vessels where necessary. An initial deep plication sutures of 4.0 monocryl sutures  were placed in the deep, subcutaneous and fascial planes to appose the lateral margins.  The subcutaneous and dermal layers were then closed with additional 4.0 monocryl sutures. The epidermis was then carefully approximated along the length of the wound using running subcuticular sutures.    Estimated blood loss was less than 10 ml for all surgical sites. A sterile pressure dressing was applied and wound care instructions, with a written handout, were given. The patient was [facility] alert and ambulatory.   The patient elected for pathology results to automatically release and understands that the clinical staff will contact them as soon as possible to notify them of the results.   FU for suture removal in n/a and in dermatology clinic with MD in PRN.        Dr. Moody staffed the patient and was present for the key portions of the above procedure.     Staff Involved:  Staff Only    Godfrey Moody MD

## 2021-03-11 NOTE — NURSING NOTE
Chief Complaint   Patient presents with     Derm Problem     excision BCC L chest     Maia Reardon, EMT

## 2021-03-15 LAB — COPATH REPORT: NORMAL

## 2021-05-18 ENCOUNTER — HOSPITAL ENCOUNTER (OUTPATIENT)
Dept: MAMMOGRAPHY | Facility: CLINIC | Age: 66
Discharge: HOME | End: 2021-05-18
Attending: FAMILY MEDICINE | Admitting: FAMILY MEDICINE
Payer: COMMERCIAL

## 2021-05-18 DIAGNOSIS — Z12.31 VISIT FOR SCREENING MAMMOGRAM: ICD-10-CM

## 2021-05-18 PROCEDURE — 77067 SCR MAMMO BI INCL CAD: CPT

## 2021-09-28 NOTE — PROGRESS NOTES
Leydi is a 66 year old who is being evaluated via a billable video visit.      How would you like to obtain your AVS? MyChart  If the video visit is dropped, the invitation should be resent by: Text to cell phone: [phone]   Will anyone else be joining your video visit? No     Video Start Time: 8:26am    Assessment & Plan     PND (post-nasal drip)  Cough now for months   Suspect PND or even GERD  Will do 2 week trial of flonase and claritin to see if helpful  If not consider pepcid 20mg BID for 2 weeks  Other idea would be to trial albuterol inhaler and see if helpful  Consider xray if persists                   No follow-ups on file.    Mary Leonardo, [facility]   Leydi is a 66 year old who presents for the following health issues     HPI     Cough    Cough for 3 months -   Not limiting activity   Not productive cough  Occasionally clear sputum  Tried nasalcrom - tried guaifenesin   Tried water -     Mostly notices cough more in the morning when she gets up  No night time symptoms    No seasonal allergy symptom but does have some irritation from dust/dirt  Today has low grade temp  Sometimes eating will make it worse      Review of Systems   Constitutional, HEENT, cardiovascular, pulmonary, gi and gu systems are negative, except as otherwise noted.      Objective           Vitals:  No vitals were obtained today due to virtual visit.    Physical Exam   GENERAL: Healthy, alert and no distress  EYES: Eyes grossly normal to inspection.  No discharge or erythema, or obvious scleral/conjunctival abnormalities.  RESP: No audible wheeze, cough, or visible cyanosis.  No visible retractions or increased work of breathing.    SKIN: Visible skin clear. No significant rash, abnormal pigmentation or lesions.  NEURO: Cranial nerves grossly intact.  Mentation and speech appropriate for age.  PSYCH: Mentation appears normal, affect normal/bright, judgement and insight intact, normal  speech and appearance well-groomed.                Video-Visit Details    Type of service:  Video Visit    Video End Time:8:39 AM    Originating Location (pt. Location): Home    Distant Location (provider location):  [facility]     Platform used for Video Visit: [employer]Well

## 2021-09-29 ENCOUNTER — VIRTUAL VISIT (OUTPATIENT)
Dept: FAMILY MEDICINE | Facility: CLINIC | Age: 66
End: 2021-09-29
Payer: COMMERCIAL

## 2021-09-29 DIAGNOSIS — R09.82 PND (POST-NASAL DRIP): Primary | ICD-10-CM

## 2021-09-29 PROCEDURE — 99213 OFFICE O/P EST LOW 20 MIN: CPT | Mod: 95 | Performed by: FAMILY MEDICINE

## 2021-10-24 ENCOUNTER — HEALTH MAINTENANCE LETTER (OUTPATIENT)
Age: 66
End: 2021-10-24

## 2021-11-01 ENCOUNTER — TRANSFERRED RECORDS (OUTPATIENT)
Dept: HEALTH INFORMATION MANAGEMENT | Facility: CLINIC | Age: 66
End: 2021-11-01
Payer: COMMERCIAL

## 2021-11-04 NOTE — PROGRESS NOTES
"  Assessment & Plan     RBBB  RBBB seen on monitor during her colonoscopy   Repeat EKG done today is same as 2013 and 2016 - no RBBB noted  Exam normal - no murmur -   Will hold off on any further cardiac workup  - EKG 12-lead complete w/read - Clinics    Cough  Chronic cough -   Discussed improved with pepcid AC -   Wonder about silent GERD causing her symptoms  Will check CXR today - hx of nodules and just want to assure all normal.  Pt will call or RTC if symptoms worsen or do not improve.   - XR Chest 2 Views; Future                 No follow-ups on file.    Mary Leonardo DO  [facility]    Sonia Holley is a 66 year old who presents for the following health issues     HPI     Patient would like to discus EKG results from 11/01/2021 (when she had her colonoscopy)  with provider. Per patient she was informed ekg showed right bundle branch block.      Pt also f/u for her cough -   She had cough -   Discussed PND vs GERD  She tried allergy med with flonase and not change  She tried pepcid AC and cough improved  No GERD symptoms           Review of Systems   Constitutional, HEENT, cardiovascular, pulmonary, gi and gu systems are negative, except as otherwise noted.      Objective    BP (!) 147/73   Pulse 65   Temp 98  F (36.7  C) (Temporal)   Ht 1.666 m (5' 5.59\")   Wt 59.8 kg (131 lb 12.8 oz)   LMP 09/15/2007   SpO2 97%   BMI 21.54 kg/m    Body mass index is 21.54 kg/m .  Physical Exam   GENERAL: healthy, alert and no distress  RESP: lungs clear to auscultation - no rales, rhonchi or wheezes  CV: regular rate and rhythm, normal S1 S2, no S3 or S4, no murmur, click or rub, no peripheral edema and peripheral pulses strong    EKG - Reviewed and interpreted by me appears normal, NSR, normal axis, normal intervals, no acute ST/T changes c/w ischemia, no LVH by voltage criteria, unchanged from previous tracings            "

## 2021-11-05 ENCOUNTER — ANCILLARY PROCEDURE (OUTPATIENT)
Dept: GENERAL RADIOLOGY | Facility: CLINIC | Age: 66
End: 2021-11-05
Attending: FAMILY MEDICINE
Payer: COMMERCIAL

## 2021-11-05 ENCOUNTER — OFFICE VISIT (OUTPATIENT)
Dept: FAMILY MEDICINE | Facility: CLINIC | Age: 66
End: 2021-11-05
Payer: COMMERCIAL

## 2021-11-05 VITALS
HEIGHT: 66 IN | SYSTOLIC BLOOD PRESSURE: 147 MMHG | WEIGHT: 131.8 LBS | OXYGEN SATURATION: 97 % | TEMPERATURE: 98 F | BODY MASS INDEX: 21.18 KG/M2 | HEART RATE: 65 BPM | DIASTOLIC BLOOD PRESSURE: 73 MMHG

## 2021-11-05 DIAGNOSIS — R05.9 COUGH: ICD-10-CM

## 2021-11-05 DIAGNOSIS — I45.10 RBBB: Primary | ICD-10-CM

## 2021-11-05 PROCEDURE — 99213 OFFICE O/P EST LOW 20 MIN: CPT | Performed by: FAMILY MEDICINE

## 2021-11-05 PROCEDURE — 93000 ELECTROCARDIOGRAM COMPLETE: CPT | Performed by: FAMILY MEDICINE

## 2021-11-05 PROCEDURE — 71046 X-RAY EXAM CHEST 2 VIEWS: CPT | Mod: FY | Performed by: RADIOLOGY

## 2021-11-05 ASSESSMENT — MIFFLIN-ST. JEOR: SCORE: 1138.1

## 2021-11-12 ASSESSMENT — ENCOUNTER SYMPTOMS
ARTHRALGIAS: 0
DIZZINESS: 0
NERVOUS/ANXIOUS: 0
MYALGIAS: 0
SORE THROAT: 0
ABDOMINAL PAIN: 0
HEMATURIA: 0
FREQUENCY: 0
PARESTHESIAS: 0
HEARTBURN: 0
DIARRHEA: 0
CHILLS: 0
CONSTIPATION: 0
SHORTNESS OF BREATH: 0
BREAST MASS: 0
COUGH: 1
NAUSEA: 0
JOINT SWELLING: 0
EYE PAIN: 0
PALPITATIONS: 0
DYSURIA: 0
FEVER: 0
HEMATOCHEZIA: 0
HEADACHES: 0
WEAKNESS: 0

## 2021-11-12 ASSESSMENT — ACTIVITIES OF DAILY LIVING (ADL): CURRENT_FUNCTION: NO ASSISTANCE NEEDED

## 2021-11-12 NOTE — PROGRESS NOTES
"SUBJECTIVE:   Leydi Zaragoza is a 66 year old female who presents for Preventive Visit.      Patient has been advised of split billing requirements and indicates understanding: Yes   Are you in the first 12 months of your Medicare coverage?  No    Healthy Habits:     In general, how would you rate your overall health?  Excellent    Frequency of exercise:  2-3 days/week    Duration of exercise:  45-60 minutes    Do you usually eat at least 4 servings of fruit and vegetables a day, include whole grains    & fiber and avoid regularly eating high fat or \"junk\" foods?  Yes    Taking medications regularly:  Yes    Medication side effects:  None    Ability to successfully perform activities of daily living:  No assistance needed    Home Safety:  No safety concerns identified    Hearing Impairment:  Difficulty understanding soft or whispered speech    In the past 6 months, have you been bothered by leaking of urine? Yes    In general, how would you rate your overall mental or emotional health?  Excellent      PHQ-2 Total Score: 0    Additional concerns today:  No    Do you feel safe in your environment? Yes    Have you ever done Advance Care Planning? (For example, a Health Directive, POLST, or a discussion with a medical provider or your loved ones about your wishes): No, advance care planning information given to patient to review.  Advanced care planning was discussed at today's visit.       Fall risk  Fallen 2 or more times in the past year?: No  Any fall with injury in the past year?: No    Cognitive Screening   1) Repeat 3 items (Leader, Season, Table)    2) Clock draw: NORMAL  3) 3 item recall: Recalls 3 objects  Results: 3 items recalled: COGNITIVE IMPAIRMENT LESS LIKELY    Mini-CogTM Copyright JAXON Dolan. Licensed by the author for use in [facility]; reprinted with permission (bassam@.[facility]). All rights reserved.      Do you have sleep apnea, excessive snoring or daytime drowsiness?: no    Reviewed and " updated as needed this visit by clinical staff  Tobacco  Allergies  Meds  Problems  Med Hx  Surg Hx  Fam Hx         Reviewed and updated as needed this visit by Provider  Tobacco  Allergies  Meds  Problems  Med Hx  Surg Hx  Fam Hx        Social History     Tobacco Use     Smoking status: Never Smoker     Smokeless tobacco: Never Used   Substance Use Topics     Alcohol use: Yes     Alcohol/week: 0.0 standard drinks     Comment: Rare, 1 per week     If you drink alcohol do you typically have >3 drinks per day or >7 drinks per week? No     No flowsheet data found.        -------------------------------------    Current providers sharing in care for this patient include:   Patient Care Team:  Mary Leonardo DO as PCP - General  Mary Leonardo DO as Assigned PCP  Mary Rousseau PsyD as Psychologist (Psychology)  Ciera Estevez AuD as Audiologist (Audiology)  Godfrey Moody MD as Assigned Surgical Provider    The following health maintenance items are reviewed in Epic and correct as of today:  Health Maintenance Due   Topic Date Due     ANNUAL REVIEW OF HM ORDERS  Never done     INFLUENZA VACCINE (1) 09/01/2021     FALL RISK ASSESSMENT  10/28/2021     Patient Active Problem List   Diagnosis     Mucous polyp of cervix     CARDIOVASCULAR SCREENING; LDL GOAL LESS THAN 160     Advanced directives, counseling/discussion     Adenomatous colon polyp     Dysplastic nevus     Endometrial/uterine adenocarcinoma (H)     Thyroid nodule     Pulmonary nodule     Past Surgical History:   Procedure Laterality Date     COLONOSCOPY  10/11/11     DILATION AND CURETTAGE, OPERATIVE HYSTEROSCOPY WITH MORCELLATOR, COMBINED  6/20/2013    Procedure: COMBINED DILATION AND CURETTAGE, OPERATIVE HYSTEROSCOPY WITH MORCELLATOR;  Dilation And Curettage, Operative Hysteroscopy With Myosure, Morcellation of endometrial polyps.;  Surgeon: Jaylene Jacobson MD;  Location:  OR      TOOTH EXTRACTION W/FORCEP       LAPAROSCOPIC  HYSTERECTOMY TOTAL, BILATERAL SALPINGO-OOPHORECTOMY, NODE DISSECTION, COMBINED  7/1/2013    Procedure: COMBINED LAPAROSCOPIC HYSTERECTOMY TOTAL, SALPINGO-OOPHORECTOMY, NODE DISSECTION;  Total Laparoscopic Hysterectomy, Bilateral Salpingo Oophorectomy, Cystoscopy;  Surgeon: Jaelyn Khan MD;  Location:  OR       Social History     Tobacco Use     Smoking status: Never Smoker     Smokeless tobacco: Never Used   Substance Use Topics     Alcohol use: Yes     Alcohol/week: 0.0 standard drinks     Comment: Rare, 1 per week     Family History   Problem Relation Age of Onset     C.A.D. Mother         stents at age 78, passed away 1/30/10     Diabetes Mother      Hypertension Mother      Heart Disease Mother         Uses heart medication for Tachycardia, has had stent placement 2 at once     Arthritis Mother      Gastrointestinal Disease Mother         bowel obstruction - passed away age 86     Hypertension Father      Asthma Father      Heart Disease Father      Neurologic Disorder Daughter         astrocytoma     Melanoma Daughter      Connective Tissue Disorder Daughter         jra, in remission, melonoma     Cancer Daughter         melanoma     Other - See Comments Daughter         atypical ductal hyperplasia     Cancer Daughter      Cancer Daughter      Diabetes Brother      Hypertension Brother      Skin Cancer No family hx of          Pneumonia Vaccine:UTD  Mammogram Screening: Mammogram Screening: Recommended mammography every 1-2 years with patient discussion and risk factor consideration  Any new diagnosis of family breast, ovarian, or bowel cancer? Yes        FHS-7: No flowsheet data found.    Mammogram Screening: Recommended mammography every 1-2 years with patient discussion and risk factor consideration  Pertinent mammograms are reviewed under the imaging tab.    Review of Systems   Constitutional: Negative for chills and fever.   HENT: Negative for congestion, ear pain, hearing loss and sore throat.   "  Eyes: Negative for pain and visual disturbance.   Respiratory: Positive for cough. Negative for shortness of breath.    Cardiovascular: Negative for chest pain, palpitations and peripheral edema.   Gastrointestinal: Negative for abdominal pain, constipation, diarrhea, heartburn, hematochezia and nausea.   Breasts:  Negative for tenderness, breast mass and discharge.   Genitourinary: Positive for urgency. Negative for dysuria, frequency, genital sores, hematuria, pelvic pain, vaginal bleeding and vaginal discharge.   Musculoskeletal: Negative for arthralgias, joint swelling and myalgias.   Skin: Negative for rash.   Neurological: Negative for dizziness, weakness, headaches and paresthesias.   Psychiatric/Behavioral: Negative for mood changes. The patient is not nervous/anxious.      Constitutional, HEENT, cardiovascular, pulmonary, GI, , musculoskeletal, neuro, skin, endocrine and psych systems are negative, except as otherwise noted.    OBJECTIVE:   /65   Pulse 66   Temp 97.5  F (36.4  C) (Skin)   Resp 16   Ht 1.67 m (5' 5.75\")   Wt 59.4 kg (131 lb)   LMP 09/15/2007   SpO2 97%   BMI 21.31 kg/m   Estimated body mass index is 21.31 kg/m  as calculated from the following:    Height as of this encounter: 1.67 m (5' 5.75\").    Weight as of this encounter: 59.4 kg (131 lb).  Physical Exam  GENERAL APPEARANCE: healthy, alert and no distress  EYES: Eyes grossly normal to inspection, PERRL and conjunctivae and sclerae normal  HENT: ear canals and TM's normal  NECK: no adenopathy, no asymmetry, masses, or scars and thyroid normal to palpation  RESP: lungs clear to auscultation - no rales, rhonchi or wheezes  BREAST: normal without masses, tenderness or nipple discharge and no palpable axillary masses or adenopathy  CV: regular rate and rhythm, normal S1 S2, no S3 or S4, no murmur, click or rub, no peripheral edema and peripheral pulses strong  ABDOMEN: soft, nontender, no hepatosplenomegaly, no masses and " "bowel sounds normal   (female): normal female external genitalia, normal urethral meatus, vaginal mucosal atrophy noted and normal post-hysterectomy exam without masses.   MS: no musculoskeletal defects are noted and gait is age appropriate without ataxia  SKIN: no suspicious lesions or rashes  NEURO: Normal strength and tone, sensory exam grossly normal, mentation intact and speech normal  PSYCH: mentation appears normal and affect normal/bright    Diagnostic Test Results:  Labs reviewed in Epic    ASSESSMENT / PLAN:   (Z00.00) Encounter for Medicare annual wellness exam  (primary encounter diagnosis)  Comment:    Plan:      (Z00.00) Routine general medical examination at a health care facility  Comment:    Plan: Lipid panel reflex to direct LDL Fasting,         Comprehensive metabolic panel (BMP + Alb, Alk         Phos, ALT, AST, Total. Bili, TP), TSH with free        T4 reflex             (C54.1) Endometrial/uterine adenocarcinoma (H)  Comment: exam benign -   Plan:      Patient has been advised of split billing requirements and indicates understanding: Yes  COUNSELING:  Reviewed preventive health counseling, as reflected in patient instructions    Estimated body mass index is 21.31 kg/m  as calculated from the following:    Height as of this encounter: 1.67 m (5' 5.75\").    Weight as of this encounter: 59.4 kg (131 lb).    Weight management plan noted, stable and monitoring    She reports that she has never smoked. She has never used smokeless tobacco.      Appropriate preventive services were discussed with this patient, including applicable screening as appropriate for cardiovascular disease, diabetes, osteopenia/osteoporosis, and glaucoma.  As appropriate for age/gender, discussed screening for colorectal cancer, prostate cancer, breast cancer, and cervical cancer. Checklist reviewing preventive services available has been given to the patient.    Reviewed patients plan of care and provided an AVS. The " Basic Care Plan (routine screening as documented in Health Maintenance) for Leydi meets the Care Plan requirement. This Care Plan has been established and reviewed with the Patient.    Counseling Resources:  ATP IV Guidelines  Pooled Cohorts Equation Calculator  Breast Cancer Risk Calculator  Breast Cancer: Medication to Reduce Risk  FRAX Risk Assessment  ICSI Preventive Guidelines  Dietary Guidelines for Americans, 2010  USDA's MyPlate  ASA Prophylaxis  Lung CA Screening    Mary Leonardo DO  [facility]    Identified Health Risks:

## 2021-11-16 ENCOUNTER — OFFICE VISIT (OUTPATIENT)
Dept: FAMILY MEDICINE | Facility: CLINIC | Age: 66
End: 2021-11-16
Payer: COMMERCIAL

## 2021-11-16 VITALS
OXYGEN SATURATION: 97 % | RESPIRATION RATE: 16 BRPM | DIASTOLIC BLOOD PRESSURE: 65 MMHG | TEMPERATURE: 97.5 F | BODY MASS INDEX: 21.05 KG/M2 | SYSTOLIC BLOOD PRESSURE: 130 MMHG | HEIGHT: 66 IN | WEIGHT: 131 LBS | HEART RATE: 66 BPM

## 2021-11-16 DIAGNOSIS — Z00.00 ENCOUNTER FOR MEDICARE ANNUAL WELLNESS EXAM: Primary | ICD-10-CM

## 2021-11-16 DIAGNOSIS — Z00.00 ROUTINE GENERAL MEDICAL EXAMINATION AT A HEALTH CARE FACILITY: ICD-10-CM

## 2021-11-16 DIAGNOSIS — C54.1 ENDOMETRIAL/UTERINE ADENOCARCINOMA (H): ICD-10-CM

## 2021-11-16 LAB
ALBUMIN SERPL-MCNC: 3.9 G/DL (ref 3.4–5)
ALP SERPL-CCNC: 55 U/L (ref 40–150)
ALT SERPL W P-5'-P-CCNC: 20 U/L (ref 0–50)
ANION GAP SERPL CALCULATED.3IONS-SCNC: 4 MMOL/L (ref 3–14)
AST SERPL W P-5'-P-CCNC: 13 U/L (ref 0–45)
BILIRUB SERPL-MCNC: 0.4 MG/DL (ref 0.2–1.3)
BUN SERPL-MCNC: 19 MG/DL (ref 7–30)
CALCIUM SERPL-MCNC: 9 MG/DL (ref 8.5–10.1)
CHLORIDE BLD-SCNC: 106 MMOL/L (ref 94–109)
CHOLEST SERPL-MCNC: 203 MG/DL
CO2 SERPL-SCNC: 29 MMOL/L (ref 20–32)
CREAT SERPL-MCNC: 0.74 MG/DL (ref 0.52–1.04)
FASTING STATUS PATIENT QL REPORTED: YES
GFR SERPL CREATININE-BSD FRML MDRD: 84 ML/MIN/1.73M2
GLUCOSE BLD-MCNC: 84 MG/DL (ref 70–99)
HDLC SERPL-MCNC: 71 MG/DL
LDLC SERPL CALC-MCNC: 122 MG/DL
NONHDLC SERPL-MCNC: 132 MG/DL
POTASSIUM BLD-SCNC: 4 MMOL/L (ref 3.4–5.3)
PROT SERPL-MCNC: 7.7 G/DL (ref 6.8–8.8)
SODIUM SERPL-SCNC: 139 MMOL/L (ref 133–144)
TRIGL SERPL-MCNC: 52 MG/DL
TSH SERPL DL<=0.005 MIU/L-ACNC: 2.77 MU/L (ref 0.4–4)

## 2021-11-16 PROCEDURE — 0004A COVID-19,PF,PFIZER (12+ YRS): CPT | Performed by: FAMILY MEDICINE

## 2021-11-16 PROCEDURE — 99397 PER PM REEVAL EST PAT 65+ YR: CPT | Mod: 25 | Performed by: FAMILY MEDICINE

## 2021-11-16 PROCEDURE — 36415 COLL VENOUS BLD VENIPUNCTURE: CPT | Performed by: FAMILY MEDICINE

## 2021-11-16 PROCEDURE — 80061 LIPID PANEL: CPT | Performed by: FAMILY MEDICINE

## 2021-11-16 PROCEDURE — 80053 COMPREHEN METABOLIC PANEL: CPT | Performed by: FAMILY MEDICINE

## 2021-11-16 PROCEDURE — 91300 COVID-19,PF,PFIZER (12+ YRS): CPT | Performed by: FAMILY MEDICINE

## 2021-11-16 PROCEDURE — 84443 ASSAY THYROID STIM HORMONE: CPT | Performed by: FAMILY MEDICINE

## 2021-11-16 ASSESSMENT — ENCOUNTER SYMPTOMS
DIARRHEA: 0
SHORTNESS OF BREATH: 0
DIZZINESS: 0
CONSTIPATION: 0
JOINT SWELLING: 0
EYE PAIN: 0
SORE THROAT: 0
CHILLS: 0
FREQUENCY: 0
PARESTHESIAS: 0
HEMATURIA: 0
ARTHRALGIAS: 0
NERVOUS/ANXIOUS: 0
DYSURIA: 0
PALPITATIONS: 0
HEADACHES: 0
WEAKNESS: 0
HEMATOCHEZIA: 0
NAUSEA: 0
COUGH: 1
MYALGIAS: 0
FEVER: 0
BREAST MASS: 0
ABDOMINAL PAIN: 0
HEARTBURN: 0

## 2021-11-16 ASSESSMENT — MIFFLIN-ST. JEOR: SCORE: 1136.96

## 2021-11-16 ASSESSMENT — ACTIVITIES OF DAILY LIVING (ADL): CURRENT_FUNCTION: NO ASSISTANCE NEEDED

## 2021-11-16 NOTE — RESULT ENCOUNTER NOTE
Dear Leydi,   Your test results are all back -   -Cholesterol levels (LDL,HDL, Triglycerides) are borderline - keep working on healthy diet and exercise. ADVISE: rechecking in 1 year.   -Liver and gallbladder tests are normal (ALT,AST, Alk phos, bilirubin), kidney function is normal (Cr, GFR), sodium is normal, potassium is normal, calcium is normal, glucose is normal.  -TSH (thyroid stimulating hormone) level is normal which indicates normal thyroid function.  Let us know if you have any questions.  -Mary Leonardo, DO

## 2021-11-16 NOTE — PATIENT INSTRUCTIONS
For change in stools - try Align probiotic  Patient Education   Personalized Prevention Plan  You are due for the preventive services outlined below.  Your care team is available to assist you in scheduling these services.  If you have already completed any of these items, please share that information with your care team to update in your medical record.  Health Maintenance Due   Topic Date Due     ANNUAL REVIEW OF HM ORDERS  Never done     Flu Vaccine (1) 09/01/2021     COVID-19 Vaccine (3 - Booster for Pfizer series) 09/26/2021     FALL RISK ASSESSMENT  10/28/2021       Signs of Hearing Loss      Hearing much better with one ear can be a sign of hearing loss.   Hearing loss is a problem shared by many people. In fact, it is one of the most common health problems, particularly as people age. Most people age 65 and older have some hearing loss. By age 80, almost everyone does. Hearing loss often occurs slowly over the years. So you may not realize your hearing has gotten worse.  Have your hearing checked  Call your healthcare provider if you:    Have to strain to hear normal conversation    Have to watch other people s faces very carefully to follow what they re saying    Need to ask people to repeat what they ve said    Often misunderstand what people are saying    Turn the volume of the television or radio up so high that others complain    Feel that people are mumbling when they re talking to you    Find that the effort to hear leaves you feeling tired and irritated    Notice, when using the phone, that you hear better with one ear than the other  [employer] last reviewed this educational content on 1/1/2020 2000-2021 [employer]. All rights reserved. This information is not intended as a substitute for professional medical care. Always follow your healthcare professional's instructions.          Urinary Incontinence, Female (Adult)   Urinary incontinence means loss of bladder control. This  problem affects many women, especially as they get older. If you have incontinence, you may be embarrassed to ask for help. But know that this problem can be treated.   Types of Incontinence  There are different types of incontinence. Two of the main types are described here. You can have more than one type.     Stress incontinence. With this type, urine leaks when pressure (stress) is put on the bladder. This may happen when you cough, sneeze, or laugh. Stress incontinence most often occurs because the pelvic floor muscles that support the bladder and urethra are weak. This can happen after pregnancy and vaginal childbirth or a hysterectomy. It can also be due to excess body weight or hormone changes.    Urge incontinence (also called overactive bladder). With this type, a sudden urge to urinate is felt often. This may happen even though there may not be much urine in the bladder. The need to urinate often during the night is common. Urge incontinence most often occurs because of bladder spasms. This may be due to bladder irritation or infection. Damage to bladder nerves or pelvic muscles, constipation, and certain medicines can also lead to urge incontinence.  Treatment depends on the cause. Further evaluation is needed to find the type you have. This will likely include an exam and certain tests. Based on the results, you and your healthcare provider can then plan treatment. Until a diagnosis is made, the home care tips below can help ease symptoms.   Home care    Do pelvic floor muscle exercises, if they are prescribed. The pelvic floor muscles help support the bladder and urethra. Many women find that their symptoms improve when doing special exercises that strengthen these muscles. To do the exercises, contract the muscles you would use to stop your stream of urine. But do this when you re not urinating. Hold for 10 seconds, then relax. Repeat 10 to 20 times in a row, at least 3 times a day. Your healthcare  provider may give you other instructions for how to do the exercises and how often.    Keep a bladder diary. This helps track how often and how much you urinate over a set period of time. Bring this diary with you to your next visit with the provider. The information can help your provider learn more about your bladder problem.    Lose weight, if advised to by your provider. Extra weight puts pressure on the bladder. Your provider can help you create a weight-loss plan that s right for you. This may include exercising more and making certain diet changes.    Don't have foods and drinks that may irritate the bladder. These can include alcohol and caffeinated drinks.    Quit smoking. Smoking and other tobacco use can lead to a long-term (chronic) cough that strains the pelvic floor muscles. Smoking may also damage the bladder and urethra. Talk with your provider about treatments or methods you can use to quit smoking.    If drinking large amounts of fluid makes you have symptoms, you may be advised to limit your fluid intake. You may also be advised to drink most of your fluids during the day and to limit fluids at night.    If you re worried about urine leakage or accidents, you may wear absorbent pads to catch urine. Change the pads often. This helps reduce discomfort. It may also reduce the risk of skin or bladder infections.    Follow-up care  Follow up with your healthcare provider, or as directed. It may take some to find the right treatment for your problem. But healthy lifestyle changes can be made right away. These include such things as exercising on a regular basis, eating a healthy diet, losing weight (if needed), and quitting smoking. Your treatment plan may include special therapies or medicines. Certain procedures or surgery may also be options. Talk about any questions you have with your provider.   When to seek medical advice  Call the healthcare provider right away if any of these occur:    Fever of  100.4 F (38 C) or higher, or as directed by your provider    Bladder pain or fullness    Belly swelling    Nausea or vomiting    Back pain    Weakness, dizziness, or fainting  [employer] last reviewed this educational content on 1/1/2020 2000-2021 [employer]. All rights reserved. This information is not intended as a substitute for professional medical care. Always follow your healthcare professional's instructions.

## 2022-01-11 ENCOUNTER — MYC MEDICAL ADVICE (OUTPATIENT)
Dept: FAMILY MEDICINE | Facility: CLINIC | Age: 67
End: 2022-01-11
Payer: COMMERCIAL

## 2022-01-12 NOTE — TELEPHONE ENCOUNTER
,    Please see mychart.  I do not see omeprazole as an active med or in hx.    Thanks,  BRENNA Love  [facility]

## 2022-02-10 NOTE — PROGRESS NOTES
Assessment & Plan     Chronic cough  Patient has had chronic cough now for extended period of time.  Initially did CXR that was negative/normal on 11/2021  She has tried flonase, nasonex and allergy meds without response  She did try pepcid and got relief but is concerned about calcium absorption with long term use.  She describes the cough as like a PND with feeling like she could pull out a string of mucous.    She has recording today that sounds likely wet like a PND.  Discussed possible allergy vs sinusitis vs other - wonder if the pepcid help due to the antihistamine properties?  Will check allergy panel   Plan to trial astelin   Will also get a referral to ENT for evaluation and possible scope to look   Pt will call or RTC if symptoms worsen or do not improve.   - Otolaryngology Referral; Future  - azelastine (ASTELIN) 0.1 % nasal spray; Spray 1 spray into both nostrils 2 times daily  - [employer] Resp Allergen Panel; Future    PND (post-nasal drip)  As above  - azelastine (ASTELIN) 0.1 % nasal spray; Spray 1 spray into both nostrils 2 times daily  - [employer] Resp Allergen Panel; Future    Other allergic rhinitis      - [employer] Resp Allergen Panel; Future                 No follow-ups on file.    Mary Leonardo [facility]   Leydi is a 66 year old who presents for the following health issues      Answers for HPI/ROS submitted by the patient on 2/11/2022  Aggravated by: nothing      Cough  This is a chronic problem. The current episode started more than 1 month ago. The problem occurs every few hours. The problem has been waxing and waning. The cough is non-productive and productive of sputum. Pertinent negatives include no chest pain, no chills, no ear congestion and no ear pain.            Review of Systems   Constitutional: Negative for chills.   HENT: Negative for ear pain.    Respiratory: Positive for cough.    Cardiovascular: Negative for chest pain.             Objective    /85   Pulse 86   Temp 97.8  F (36.6  C)   Wt 59.7 kg (131 lb 11.2 oz)   LMP 09/15/2007   SpO2 96%   BMI 21.42 kg/m    Body mass index is 21.42 kg/m .  Physical Exam   GENERAL: healthy, alert and no distress

## 2022-02-11 ENCOUNTER — OFFICE VISIT (OUTPATIENT)
Dept: FAMILY MEDICINE | Facility: CLINIC | Age: 67
End: 2022-02-11
Payer: COMMERCIAL

## 2022-02-11 VITALS
TEMPERATURE: 97.8 F | DIASTOLIC BLOOD PRESSURE: 85 MMHG | SYSTOLIC BLOOD PRESSURE: 131 MMHG | HEART RATE: 86 BPM | BODY MASS INDEX: 21.42 KG/M2 | WEIGHT: 131.7 LBS | OXYGEN SATURATION: 96 %

## 2022-02-11 DIAGNOSIS — R09.82 PND (POST-NASAL DRIP): ICD-10-CM

## 2022-02-11 DIAGNOSIS — J30.89 OTHER ALLERGIC RHINITIS: ICD-10-CM

## 2022-02-11 DIAGNOSIS — R05.3 CHRONIC COUGH: Primary | ICD-10-CM

## 2022-02-11 PROCEDURE — 99213 OFFICE O/P EST LOW 20 MIN: CPT | Performed by: FAMILY MEDICINE

## 2022-02-11 PROCEDURE — 36415 COLL VENOUS BLD VENIPUNCTURE: CPT | Performed by: FAMILY MEDICINE

## 2022-02-11 PROCEDURE — 86003 ALLG SPEC IGE CRUDE XTRC EA: CPT | Mod: 59 | Performed by: FAMILY MEDICINE

## 2022-02-11 PROCEDURE — 82785 ASSAY OF IGE: CPT | Performed by: FAMILY MEDICINE

## 2022-02-11 RX ORDER — AZELASTINE 1 MG/ML
1 SPRAY, METERED NASAL 2 TIMES DAILY
Qty: 30 ML | Refills: 0 | Status: SHIPPED | OUTPATIENT
Start: 2022-02-11 | End: 2022-04-06

## 2022-02-11 ASSESSMENT — ENCOUNTER SYMPTOMS
COUGH: 1
CHILLS: 0

## 2022-02-15 NOTE — RESULT ENCOUNTER NOTE
Dear Leydi,   Your test results are all back -   -Cholesterol levels (LDL,HDL, Triglycerides) are elevated slightly - keep working on healthy eating and exercise.  ADVISE: rechecking in 1 year.   -Kidney function is normal (Cr, GFR), Sodium is normal, Potassium is normal, Calcium is normal, Glucose is normal.   -TSH (thyroid stimulating hormone) level is normal which indicates normal thyroid function.  Let us know if you have any questions.  -Mary Leonardo, DO  
[FreeTextEntry1] : This is a 64 y/o woman with hx of venous insufficiency, OA, infiltrative basal cell carcinoma 11/2020, right ruptured achilles tendon, and chronic back pain here for f/u of seronegative RA.\par \par She had covid 12/27/2021. \par She had her booster already.\par Had symptoms of headache, rash, and GI symptoms.  \par \par She started MTX 8/2020 and has felt improvement. Completed a course of prednisone. \par Denies any issues with MTX, no oral sores, rashes, hair loss, abdominal pain, cough, sob. \par She was switched to MTX subQ now on 17.5mg subQ.\par \par She has had 3-4 total humira injections.  \par \par Her pain level is 5/10 with 5-10 minutes of AM stiffness. \par She has biggest issue in knees, then low back. \par \par She had veins urgery 12/16/2021, but that is finished and she started the humira.  \par MRI shows b/L sacroiliitis. \par \par MRI left knee shows complex tear meniscus, tendinosis, ganglion cyst, small joint effusion with synovitis, baker's cyst. \par She finished gel shots for knees. \par Her orthopedic thinks it's coming from her back, but now she is not so sure. \par \par MRI right knee shows 11/13/20 meniscal tear, partial rupture of para meniscal cyst. Small joint effusion, chondromalacia. \par \par She saw a cardiologist to check on her SOB and fatigue. It is now resolved. She did have 2D ECHO.\par \par The tizanidine has been helpful, once in a while.\par \par Prior hx\par She developed right hand pain 1 year ago, mainly in MCPs.\par She saw Dr. Sinha who told her she had some sort of arthritis for which he Rx'd her prednisone. \par She has since developed pain in b/L knees, b/L feet, b/L shoulders\par The prednisone helped maybe 20%. She now reports that after the second 3 week course of prednisone, she did feel better. \par Her stiffness is 10-20 minutes\par She hasn't had prednisone since 6/2019. \par She has been self treating with ibuprofen 800 BID which helps. Denies any abd issues. \par \par \par Had allergic reaction with mobic and voltaren. \par She had Tramadol but it made her too drowsy. \par \par \par \par

## 2022-02-16 LAB

## 2022-02-16 NOTE — RESULT ENCOUNTER NOTE
Dear Leydi,   Your test results are all back -   Interesting - no common allergies found.  Let us know if you have any questions.  -Mary Leonardo, DO

## 2022-02-28 NOTE — TELEPHONE ENCOUNTER
FUTURE VISIT INFORMATION      FUTURE VISIT INFORMATION:    Date: 6/14/22    Time: 8AM    Location: [facility] [place]  REFERRAL INFORMATION:    Referring provider:  Mary Leonardo DO    Referring providers clinic:  [facility]    Reason for visit/diagnosis  Chronic cough per Pt, Ref by Mary Leonardo DO in [facility], Recs in [facility]. [facility] [place] [facility] location verified.     RECORDS REQUESTED FROM:         Clinic name Comments Records Status Imaging Status    [facility] 2/11/22 note and referral from Mary Leonardo DO Epic     Imaging 11/5/21 XR Chest   11/4/2020 CT Chest  [facility] PACS   [facility] [place] 2/21/19 note from Dr Powers [place] campus  *not seen for throat/cough related issues  req 4/22/22 - no recs                            4/22/22 11:50AM sent a fax to [facility] for recs - Amay   5/24/22 11:12AM received a fax from [facility], was not seen there for throat/cough related issues. No records to send  - Amay

## 2022-03-02 ENCOUNTER — MYC MEDICAL ADVICE (OUTPATIENT)
Dept: FAMILY MEDICINE | Facility: CLINIC | Age: 67
End: 2022-03-02

## 2022-03-02 DIAGNOSIS — R05.3 CHRONIC COUGH: Primary | ICD-10-CM

## 2022-04-05 NOTE — PROGRESS NOTES
Assessment & Plan     Nasal septal abscess  Nasal small abscess    Will warm compress  Plan to apply bactroban TID for 5-7 days  Pt will call or RTC if symptoms worsen or do not improve.   - mupirocin (BACTROBAN) 2 % external ointment; Apply topically 3 times daily                 No follow-ups on file.    Mary Leonardo DO  [facility]    Sonia Holley is a 66 year old who presents for the following health issues     History of Present Illness       Reason for visit:  White bump in nostril  Symptom onset:  3-7 days ago  Symptoms include:  Soreness  Symptom intensity:  Moderate  Symptom progression:  Improving  Had these symptoms before:  No  What makes it worse:  No  What makes it better:  No    She eats 4 or more servings of fruits and vegetables daily.She consumes 0 sweetened beverage(s) daily.She exercises with enough effort to increase her heart rate 30 to 60 minutes per day.  She exercises with enough effort to increase her heart rate 4 days per week.   She is taking medications regularly.       Concern - Nasal abscess   Onset: 3-7 days ago  Description: white bump in nostril   Intensity: moderate  Progression of Symptoms:  improving  Accompanying Signs & Symptoms: soreness  Previous history of similar problem: no  Precipitating factors:        Worsened by: none  Alleviating factors:        Improved by: none  Therapies tried and outcome: warm washcloth improved slightly        Review of Systems   Constitutional, HEENT, cardiovascular, pulmonary, gi and gu systems are negative, except as otherwise noted.      Objective    BP (!) 146/78   Pulse 63   Temp 97.5  F (36.4  C)   Resp 18   Wt 59.6 kg (131 lb 8 oz)   LMP 09/15/2007   SpO2 97%   BMI 21.39 kg/m    Body mass index is 21.39 kg/m .  Physical Exam   GENERAL: healthy, alert and no distress  HENT: left nares - 3-4mm raised purulent papule

## 2022-04-06 ENCOUNTER — OFFICE VISIT (OUTPATIENT)
Dept: FAMILY MEDICINE | Facility: CLINIC | Age: 67
End: 2022-04-06
Payer: COMMERCIAL

## 2022-04-06 VITALS
BODY MASS INDEX: 21.39 KG/M2 | OXYGEN SATURATION: 97 % | TEMPERATURE: 97.5 F | HEART RATE: 63 BPM | DIASTOLIC BLOOD PRESSURE: 78 MMHG | SYSTOLIC BLOOD PRESSURE: 146 MMHG | WEIGHT: 131.5 LBS | RESPIRATION RATE: 18 BRPM

## 2022-04-06 DIAGNOSIS — J34.0 NASAL SEPTAL ABSCESS: Primary | ICD-10-CM

## 2022-04-06 PROCEDURE — 99213 OFFICE O/P EST LOW 20 MIN: CPT | Performed by: FAMILY MEDICINE

## 2022-04-06 RX ORDER — MUPIROCIN 20 MG/G
OINTMENT TOPICAL 3 TIMES DAILY
Qty: 30 G | Refills: 0 | Status: SHIPPED | OUTPATIENT
Start: 2022-04-06 | End: 2022-11-18

## 2022-04-06 NOTE — NURSING NOTE
"Chief Complaint   Patient presents with     Nose Problem     initial BP (!) 155/77   Pulse 63   Temp 97.5  F (36.4  C)   Resp 18   Wt 59.6 kg (131 lb 8 oz)   LMP 09/15/2007   SpO2 97%   BMI 21.39 kg/m   Estimated body mass index is 21.39 kg/m  as calculated from the following:    Height as of 11/16/21: 1.67 m (5' 5.75\").    Weight as of this encounter: 59.6 kg (131 lb 8 oz).  BP completed using cuff size: regular.  R arm      Health Maintenance that is potentially due pending provider review:  NONE    Karen Vallecillo RN    "

## 2022-04-13 ENCOUNTER — OFFICE VISIT (OUTPATIENT)
Dept: DERMATOLOGY | Facility: CLINIC | Age: 67
End: 2022-04-13
Payer: COMMERCIAL

## 2022-04-13 DIAGNOSIS — D22.9 MULTIPLE BENIGN NEVI: ICD-10-CM

## 2022-04-13 DIAGNOSIS — Z85.828 HISTORY OF NONMELANOMA SKIN CANCER: ICD-10-CM

## 2022-04-13 DIAGNOSIS — L82.1 SEBORRHEIC KERATOSIS: ICD-10-CM

## 2022-04-13 DIAGNOSIS — D18.01 CHERRY ANGIOMA: ICD-10-CM

## 2022-04-13 DIAGNOSIS — L81.4 LENTIGINES: ICD-10-CM

## 2022-04-13 DIAGNOSIS — D48.9 NEOPLASM OF UNCERTAIN BEHAVIOR: Primary | ICD-10-CM

## 2022-04-13 PROCEDURE — 88342 IMHCHEM/IMCYTCHM 1ST ANTB: CPT | Mod: 26 | Performed by: DERMATOLOGY

## 2022-04-13 PROCEDURE — 99213 OFFICE O/P EST LOW 20 MIN: CPT | Mod: 25 | Performed by: DERMATOLOGY

## 2022-04-13 PROCEDURE — 88342 IMHCHEM/IMCYTCHM 1ST ANTB: CPT | Mod: TC | Performed by: ADVANCED PRACTICE MIDWIFE

## 2022-04-13 PROCEDURE — 88305 TISSUE EXAM BY PATHOLOGIST: CPT | Mod: 26 | Performed by: DERMATOLOGY

## 2022-04-13 PROCEDURE — 11102 TANGNTL BX SKIN SINGLE LES: CPT | Mod: GC | Performed by: DERMATOLOGY

## 2022-04-13 ASSESSMENT — PAIN SCALES - GENERAL: PAINLEVEL: NO PAIN (0)

## 2022-04-13 NOTE — NURSING NOTE
Lidocaine-epinephrine 1-1:295380 % injection   0.5mL once for one use, starting 4/13/2022 ending 4/13/2022,  2mL disp, R-0, injection  Injected by Ewa Wakefield CMA

## 2022-04-13 NOTE — PATIENT INSTRUCTIONS
Shave Biopsy Instructions    For the biopsied area, leave the bandaid on for 24 hours. After 24 hours, wash the biopsied area with soap and water and then put on a Aquaphor, Vasaline, Vaniply, or plain petroleum jelly and cover a bandaid. Wash and replace the ointment and a bandaid every day until the area heals. If you develop spreading redness, pus, or tenderness, please call the clinic. You may experience some mild itching as the skin heals. Please try to avoid scratching the area.     If the biopsy area were to start to bleed, apply firm direct pressure for 15 minutes without peeking. If after 15 minutes, the area is still bleeding, you can repeat the 15 minutes of pressure for 2 more times. But, if after 45 minutes, it is still bleeding, please call the clinic or go to urgent care/the emergency department.     It may take up to 2 weeks to get a result from the biopsy taken today. If you have not received a message or notification of the result after 2 weeks, please call our office.

## 2022-04-13 NOTE — LETTER
4/13/2022       RE: Leydi Zaragoza  [address]  [place] [address]     Dear Colleague,    Thank you for referring your patient, Leydi Zaragoza, to the [facility] D[facility] [place] at [facility]. Please see a copy of my visit note below.    [facility] Dermatology Note  Encounter Date: Apr 13, 2022  Office Visit     Dermatology Problem List:  # BCC, L chest excised 3/11/21  3 History of DN, >10 years ago, right arm without pigment recurrence  # Family history of melanoma, daughter (40s), did not require SLNB  # NUB, L upper back, s/p shave bx 4/13/22  ____________________________________________    Assessment & Plan:    # NUB, left upper back, s/p shave bx 4/13/22  Small pink papule with some central hypopigmentation and vessels under dermoscopy. Patient endorses having some rough gritty bumps around this area of her upper back that she occasionally scratches off and bleeds.   - Will proceed with biopsy to r/o BCC    # History of nonmelanoma skin cancer, no clincial evidence of recurrence.   - Sun protection: Counseled SPF30+ sunscreen, UPF clothing, sun avoidance, tanning bed avoidance.  - ABCDEs: Counseled ABCDEs of melanoma: Asymmetry, Border (irregularity), Color (not uniform, changes in color), Diameter (greater than 6 mm which is about the size of a pencil eraser), and Evolving (any changes in preexisting moles).    # Benign lesions (cherry angiomas, seborrheic keratoses, lentigines, clinically banal appearing melanocytic nevi)  - Reassurance provided and benign nature of condition discussed  - ABCDs of melanoma were discussed and self skin checks were advised  - Signs and symptoms of NMSC discussed  - Sun precaution was advised including the use of sun screens of SPF 30 or higher, sun protective clothing, and avoidance of tanning beds    Procedures Performed:   - PROCEDURE: Shave biopsy   After discussion of  benefits and risks including but not limited to bleeding, infection, scar, incomplete removal, recurrence, and non-diagnostic biopsy, written consent and photographs were obtained. The area was cleaned with isopropyl alcohol. < 1mL of 1% lidocaine with epinephrine was injected to obtain adequate anesthesia. A shave biopsy was performed. Hemostasis was achieved with aluminium chloride. Vaseline and a sterile dressing were applied. The patient tolerated the procedure and no complications were noted. The patient was provided with verbal and written post care instructions.     Follow-up: 1 year(s) in-person, or earlier for new or changing lesions    Staff and Resident:     Staffed with Dr. Keo Carrasquillo MD (PGY-2)    Staff Physician Comments:   I saw and evaluated the patient with the resident and I agree with the assessment and plan.  I was present for the key portions of the above major procedure and examination.    Blossom Crowley MD    Department of Dermatology  [facility]: Phone: [phone], Fax: [phone]  4/14/2022     ____________________________________________    CC: Skin Check (States that she has a spot of concern on the left shoulder)      HPI:  Ms. Leydi Zaragoza is a(n) 66 year old female who presents today as a return patient for skin check. Last seen by Dr. Moody on 3/11/21, at which time the patient underwent an excision for a BCC on the left chest. Overall today patient doing well with no particular concerns. She does spent a lot of time in the sun (plays tennis outside) but uses sunscreen regularly.    Labs Reviewed:  N/A    Physical Exam:  Vitals: LMP 09/15/2007   SKIN: Full skin, which includes the head/face, both arms, chest, back, abdomen,both legs, buttocks, digits and/or nails, was examined.  - Left upper back with ~4 mm pink thin papule with central hypopigmentation and vessels seen  under dermoscopy  - Right central anterior neck 4 mm even light-brown macule  - Multiple regular brown pigmented macules and papules are identified on the trunk and extremities.   - Scattered brown macules on sun exposed areas..   - There are waxy stuck on tan to brown papules on the trunk and extremities.   - Previous site of BCC on L chest without evidence of recurrence    Medications:  Current Outpatient Medications   Medication     Calcium Carbonate-Vitamin D (CALCIUM 500 + D PO)     mupirocin (BACTROBAN) 2 % external ointment     No current facility-administered medications for this visit.        Past Medical History:   Patient Active Problem List   Diagnosis     Mucous polyp of cervix     CARDIOVASCULAR SCREENING; LDL GOAL LESS THAN 160     Advanced directives, counseling/discussion     Adenomatous colon polyp     Dysplastic nevus     Endometrial/uterine adenocarcinoma (H)     Thyroid nodule     Pulmonary nodule     Past Medical History:   Diagnosis Date     Acute bronchospasm     one year but resolved - exercise induced.     Cancer (H) 2013    endometrioid andenocarcinoma     Uterine polyp         CC Mary Leonardo DO  [address] [facility]  [address]  [place] [address] on close of this encounter.

## 2022-04-13 NOTE — PROGRESS NOTES
[facility] Dermatology Note  Encounter Date: Apr 13, 2022  Office Visit     Dermatology Problem List:  # BCC, L chest excised 3/11/21  3 History of DN, >10 years ago, right arm without pigment recurrence  # Family history of melanoma, daughter (40s), did not require SLNB  # NUB, L upper back, s/p shave bx 4/13/22  ____________________________________________    Assessment & Plan:    # NUB, left upper back, s/p shave bx 4/13/22  Small pink papule with some central hypopigmentation and vessels under dermoscopy. Patient endorses having some rough gritty bumps around this area of her upper back that she occasionally scratches off and bleeds.   - Will proceed with biopsy to r/o BCC    # History of nonmelanoma skin cancer, no clincial evidence of recurrence.   - Sun protection: Counseled SPF30+ sunscreen, UPF clothing, sun avoidance, tanning bed avoidance.  - ABCDEs: Counseled ABCDEs of melanoma: Asymmetry, Border (irregularity), Color (not uniform, changes in color), Diameter (greater than 6 mm which is about the size of a pencil eraser), and Evolving (any changes in preexisting moles).    # Benign lesions (cherry angiomas, seborrheic keratoses, lentigines, clinically banal appearing melanocytic nevi)  - Reassurance provided and benign nature of condition discussed  - ABCDs of melanoma were discussed and self skin checks were advised  - Signs and symptoms of NMSC discussed  - Sun precaution was advised including the use of sun screens of SPF 30 or higher, sun protective clothing, and avoidance of tanning beds    Procedures Performed:   - PROCEDURE: Shave biopsy   After discussion of benefits and risks including but not limited to bleeding, infection, scar, incomplete removal, recurrence, and non-diagnostic biopsy, written consent and photographs were obtained. The area was cleaned with isopropyl alcohol. < 1mL of 1% lidocaine with epinephrine was injected to obtain adequate anesthesia. A shave biopsy  was performed. Hemostasis was achieved with aluminium chloride. Vaseline and a sterile dressing were applied. The patient tolerated the procedure and no complications were noted. The patient was provided with verbal and written post care instructions.     Follow-up: 1 year(s) in-person, or earlier for new or changing lesions    Staff and Resident:     Staffed with Dr. Keo Carrasquillo MD (PGY-2)    Staff Physician Comments:   I saw and evaluated the patient with the resident and I agree with the assessment and plan.  I was present for the key portions of the above major procedure and examination.    Blossom Crowley MD    Department of Dermatology  [facility]: Phone: [phone], Fax: [phone]  4/14/2022     ____________________________________________    CC: Skin Check (States that she has a spot of concern on the left shoulder)      HPI:  Ms. Leydi Zaragoza is a(n) 66 year old female who presents today as a return patient for skin check. Last seen by Dr. Moody on 3/11/21, at which time the patient underwent an excision for a BCC on the left chest. Overall today patient doing well with no particular concerns. She does spent a lot of time in the sun (plays tennis outside) but uses sunscreen regularly.    Labs Reviewed:  N/A    Physical Exam:  Vitals: LMP 09/15/2007   SKIN: Full skin, which includes the head/face, both arms, chest, back, abdomen,both legs, buttocks, digits and/or nails, was examined.  - Left upper back with ~4 mm pink thin papule with central hypopigmentation and vessels seen under dermoscopy  - Right central anterior neck 4 mm even light-brown macule  - Multiple regular brown pigmented macules and papules are identified on the trunk and extremities.   - Scattered brown macules on sun exposed areas..   - There are waxy stuck on tan to brown papules on the trunk and extremities.   - Previous  site of BCC on L chest without evidence of recurrence    Medications:  Current Outpatient Medications   Medication     Calcium Carbonate-Vitamin D (CALCIUM 500 + D PO)     mupirocin (BACTROBAN) 2 % external ointment     No current facility-administered medications for this visit.        Past Medical History:   Patient Active Problem List   Diagnosis     Mucous polyp of cervix     CARDIOVASCULAR SCREENING; LDL GOAL LESS THAN 160     Advanced directives, counseling/discussion     Adenomatous colon polyp     Dysplastic nevus     Endometrial/uterine adenocarcinoma (H)     Thyroid nodule     Pulmonary nodule     Past Medical History:   Diagnosis Date     Acute bronchospasm     one year but resolved - exercise induced.     Cancer (H) 2013    endometrioid andenocarcinoma     Uterine polyp         CC Mary Leonardo,   [address]  [place] [address] on close of this encounter.

## 2022-04-13 NOTE — NURSING NOTE
Dermatology Rooming Note    Leydi Zaragoza's goals for this visit include:   Chief Complaint   Patient presents with     Skin Check     States that she has a spot of concern on the left shoulder     Ewa Wakefield CMA on 4/13/2022 at 9:18 AM

## 2022-04-20 LAB
PATH REPORT.COMMENTS IMP SPEC: NORMAL
PATH REPORT.COMMENTS IMP SPEC: NORMAL
PATH REPORT.FINAL DX SPEC: NORMAL
PATH REPORT.GROSS SPEC: NORMAL
PATH REPORT.MICROSCOPIC SPEC OTHER STN: NORMAL
PATH REPORT.RELEVANT HX SPEC: NORMAL

## 2022-04-22 ENCOUNTER — PRE VISIT (OUTPATIENT)
Dept: OTOLARYNGOLOGY | Facility: CLINIC | Age: 67
End: 2022-04-22

## 2022-05-23 ENCOUNTER — TRANSFERRED RECORDS (OUTPATIENT)
Dept: HEALTH INFORMATION MANAGEMENT | Facility: CLINIC | Age: 67
End: 2022-05-23
Payer: COMMERCIAL

## 2022-05-25 ENCOUNTER — TRANSFERRED RECORDS (OUTPATIENT)
Dept: HEALTH INFORMATION MANAGEMENT | Facility: CLINIC | Age: 67
End: 2022-05-25
Payer: COMMERCIAL

## 2022-06-14 ENCOUNTER — PRE VISIT (OUTPATIENT)
Dept: OTOLARYNGOLOGY | Facility: CLINIC | Age: 67
End: 2022-06-14

## 2022-07-31 ENCOUNTER — HEALTH MAINTENANCE LETTER (OUTPATIENT)
Age: 67
End: 2022-07-31

## 2022-10-15 ENCOUNTER — HEALTH MAINTENANCE LETTER (OUTPATIENT)
Age: 67
End: 2022-10-15

## 2022-10-26 ENCOUNTER — HOSPITAL ENCOUNTER (OUTPATIENT)
Dept: MAMMOGRAPHY | Facility: CLINIC | Age: 67
Discharge: HOME | End: 2022-10-26
Attending: FAMILY MEDICINE | Admitting: FAMILY MEDICINE
Payer: COMMERCIAL

## 2022-10-26 DIAGNOSIS — Z12.31 VISIT FOR SCREENING MAMMOGRAM: ICD-10-CM

## 2022-10-26 PROCEDURE — 77067 SCR MAMMO BI INCL CAD: CPT

## 2022-11-17 NOTE — PROGRESS NOTES
"SUBJECTIVE:   Leydi is a 67 year old who presents for Preventive Visit.     Patient has been advised of split billing requirements and indicates understanding: Yes  Are you in the first 12 months of your Medicare coverage?  No    Healthy Habits:     In general, how would you rate your overall health?  Excellent    Frequency of exercise:  2-3 days/week    Duration of exercise:  30-45 minutes    Do you usually eat at least 4 servings of fruit and vegetables a day, include whole grains    & fiber and avoid regularly eating high fat or \"junk\" foods?  Yes    Taking medications regularly:  Not Applicable    Medication side effects:  Not applicable    Ability to successfully perform activities of daily living:  No assistance needed    Home Safety:  No safety concerns identified    Hearing Impairment:  Feel that people are mumbling or not speaking clearly    In the past 6 months, have you been bothered by leaking of urine? Yes    In general, how would you rate your overall mental or emotional health?  Excellent      PHQ-2 Total Score: 0    Additional concerns today:  No      Have you ever done Advance Care Planning? (For example, a Health Directive, POLST, or a discussion with a medical provider or your loved ones about your wishes):       Fall risk  Fallen 2 or more times in the past year?: No  Any fall with injury in the past year?: No    Cognitive Screening   1) Repeat 3 items (Leader, Season, Table)    2) Clock draw: NORMAL  3) 3 item recall: Recalls 3 objects  Results: 3 items recalled: COGNITIVE IMPAIRMENT LESS LIKELY    Mini-CogTM Copyright JAXON Dolan. Licensed by the author for use in [facility]; reprinted with permission (bassam@.[facility]). All rights reserved.      Do you have sleep apnea, excessive snoring or daytime drowsiness?: no    Reviewed and updated as needed this visit by clinical staff   Tobacco  Allergies  Meds  Problems  Med Hx  Surg Hx  Fam Hx          Reviewed and updated as needed this " visit by Provider   Tobacco  Allergies  Meds  Problems  Med Hx  Surg Hx  Fam Hx         Social History     Tobacco Use     Smoking status: Never     Smokeless tobacco: Never   Substance Use Topics     Alcohol use: Yes     Alcohol/week: 0.0 standard drinks     Comment: Rare, 1 per week     If you drink alcohol do you typically have >3 drinks per day or >7 drinks per week? No    Alcohol Use 11/18/2022   Prescreen: >3 drinks/day or >7 drinks/week? No   Prescreen: >3 drinks/day or >7 drinks/week? -   No flowsheet data found.      The 10-year ASCVD risk score (Tiera HERNANDEZ, et al., 2019) is: 6.3%    Values used to calculate the score:      Age: 67 years      Sex: Female      Is Non- : No      Diabetic: No      Tobacco smoker: No      Systolic Blood Pressure: 112 mmHg      Is BP treated: No      HDL Cholesterol: 71 mg/dL      Total Cholesterol: 203 mg/dL   -------------------------------------    Current providers sharing in care for this patient include:   Patient Care Team:  Mary Leonardo DO as PCP - General  Mary Leonardo DO as Assigned PCP  Mary Rousseau PsyD as Psychologist (Psychology)  Ciera Etsevez AuD as Audiologist (Audiology)  Antonio Sloan MD as MD (Otolaryngology)  Stephy Bond MD as MD (Otolaryngology)  Blososm Crowley MD as Assigned Surgical Provider    The following health maintenance items are reviewed in Epic and correct as of today:  Health Maintenance   Topic Date Due     ANNUAL REVIEW OF HM ORDERS  Never done     Pneumococcal Vaccine: 65+ Years (2 - PCV) 10/28/2021     COVID-19 Vaccine (4 - Booster for Pfizer series) 01/11/2022     INFLUENZA VACCINE (1) 09/01/2022     MEDICARE ANNUAL WELLNESS VISIT  11/16/2022     MAMMO SCREENING  10/26/2023     FALL RISK ASSESSMENT  11/18/2023     DTAP/TDAP/TD IMMUNIZATION (4 - Td or Tdap) 01/05/2026     COLORECTAL CANCER SCREENING  11/01/2026     LIPID  11/16/2026     ADVANCE CARE PLANNING  11/16/2026     ERIC   10/23/2034     HEPATITIS C SCREENING  Completed     PHQ-2 (once per calendar year)  Completed     ZOSTER IMMUNIZATION  Completed     IPV IMMUNIZATION  Aged Out     MENINGITIS IMMUNIZATION  Aged Out     Patient Active Problem List   Diagnosis     Mucous polyp of cervix     CARDIOVASCULAR SCREENING; LDL GOAL LESS THAN 160     Advanced directives, counseling/discussion     Adenomatous colon polyp     Dysplastic nevus     Endometrial/uterine adenocarcinoma (H)     Thyroid nodule     Pulmonary nodule     Past Surgical History:   Procedure Laterality Date     COLONOSCOPY  10/11/11     DILATION AND CURETTAGE, OPERATIVE HYSTEROSCOPY WITH MORCELLATOR, COMBINED  6/20/2013    Procedure: COMBINED DILATION AND CURETTAGE, OPERATIVE HYSTEROSCOPY WITH MORCELLATOR;  Dilation And Curettage, Operative Hysteroscopy With Myosure, Morcellation of endometrial polyps.;  Surgeon: Jaylene Jacobson MD;  Location: [facility] OR     HC TOOTH EXTRACTION W/FORCEP       LAPAROSCOPIC HYSTERECTOMY TOTAL, BILATERAL SALPINGO-OOPHORECTOMY, NODE DISSECTION, COMBINED  7/1/2013    Procedure: COMBINED LAPAROSCOPIC HYSTERECTOMY TOTAL, SALPINGO-OOPHORECTOMY, NODE DISSECTION;  Total Laparoscopic Hysterectomy, Bilateral Salpingo Oophorectomy, Cystoscopy;  Surgeon: Jaelyn Khan MD;  Location: [facility] OR       Social History     Tobacco Use     Smoking status: Never     Smokeless tobacco: Never   Substance Use Topics     Alcohol use: Yes     Alcohol/week: 0.0 standard drinks     Comment: Rare, 1 per week     Family History   Problem Relation Age of Onset     C.A.D. Mother         stents at age 78, passed away 1/30/10     Diabetes Mother      Hypertension Mother      Heart Disease Mother         Uses heart medication for Tachycardia, has had stent placement 2 at once     Arthritis Mother      Gastrointestinal Disease Mother         bowel obstruction - passed away age 86     Hypertension Father      Asthma Father      Heart Disease Father      Neurologic Disorder  "Daughter         astrocytoma     Melanoma Daughter      Connective Tissue Disorder Daughter         jrsatish, in remission, melonoma     Cancer Daughter         melanoma     Other - See Comments Daughter         atypical ductal hyperplasia     Cancer Daughter      Cancer Daughter      Diabetes Brother      Hypertension Brother      Skin Cancer No family hx of          Pneumonia Vaccine:UTD   Mammogram Screening: Mammogram Screening: Recommended mammography every 1-2 years with patient discussion and risk factor consideration    Breast CA Risk Assessment (FHS-7) 11/12/2021   Do you have a family history of breast, colon, or ovarian cancer? No / Unknown           Pertinent mammograms are reviewed under the imaging tab.    Review of Systems   Constitutional: Negative for chills and fever.   HENT: Negative for congestion, ear pain, hearing loss and sore throat.    Eyes: Negative for pain and visual disturbance.   Respiratory: Negative for cough and shortness of breath.    Cardiovascular: Negative for chest pain, palpitations and peripheral edema.   Gastrointestinal: Negative for abdominal pain, constipation, diarrhea, heartburn, hematochezia and nausea.   Breasts:  Negative for tenderness, breast mass and discharge.   Genitourinary: Positive for frequency and urgency. Negative for dysuria, genital sores, hematuria, pelvic pain, vaginal bleeding and vaginal discharge.   Musculoskeletal: Positive for arthralgias. Negative for joint swelling and myalgias.   Skin: Negative for rash.   Neurological: Negative for dizziness, weakness, headaches and paresthesias.   Psychiatric/Behavioral: Negative for mood changes. The patient is nervous/anxious.          OBJECTIVE:   /68 (BP Location: Left arm, Patient Position: Sitting, Cuff Size: Adult Regular)   Pulse 64   Temp 97.3  F (36.3  C) (Temporal)   Resp 16   Ht 1.67 m (5' 5.75\")   Wt 60.3 kg (133 lb)   LMP 09/15/2007   SpO2 99%   BMI 21.63 kg/m   Estimated body mass index " "is 21.63 kg/m  as calculated from the following:    Height as of this encounter: 1.67 m (5' 5.75\").    Weight as of this encounter: 60.3 kg (133 lb).  Physical Exam  GENERAL APPEARANCE: healthy, alert and no distress  EYES: Eyes grossly normal to inspection, PERRL and conjunctivae and sclerae normal  HENT: ear canals and TM's normal, nose and mouth without ulcers or lesions, oropharynx clear and oral mucous membranes moist  NECK: no adenopathy, no asymmetry, masses, or scars and thyroid normal to palpation  RESP: lungs clear to auscultation - no rales, rhonchi or wheezes  BREAST: normal without masses, tenderness or nipple discharge and no palpable axillary masses or adenopathy  CV: regular rate and rhythm, normal S1 S2, no S3 or S4, no murmur, click or rub, no peripheral edema and peripheral pulses strong  ABDOMEN: soft, nontender, no hepatosplenomegaly, no masses and bowel sounds normal   (female): normal female external genitalia, normal urethral meatus, vaginal mucosal atrophy noted and normal post-hysterectomy exam without masses.   MS: no musculoskeletal defects are noted and gait is age appropriate without ataxia  SKIN: no suspicious lesions or rashes  NEURO: Normal strength and tone, sensory exam grossly normal, mentation intact and speech normal  PSYCH: mentation appears normal and affect normal/bright    Diagnostic Test Results:  Labs reviewed in Epic    ASSESSMENT / PLAN:   (Z00.00) Encounter for Medicare annual wellness exam  (primary encounter diagnosis)  Comment:    Plan: Lipid panel reflex to direct LDL Fasting,         Comprehensive metabolic panel (BMP + Alb, Alk         Phos, ALT, AST, Total. Bili, TP)             (M85.89) Osteopenia of multiple sites  Comment:    Plan: DX Hip/Pelvis/Spine             (R91.1) Pulmonary nodule  Comment:    Plan:      (E04.1) Thyroid nodule  Comment:    Plan: TSH with free T4 reflex             (J32.4) Chronic pansinusitis  Comment:    Plan:  "           COUNSELING:  Reviewed preventive health counseling, as reflected in patient instructions        She reports that she has never smoked. She has never used smokeless tobacco.      Appropriate preventive services were discussed with this patient, including applicable screening as appropriate for cardiovascular disease, diabetes, osteopenia/osteoporosis, and glaucoma.  As appropriate for age/gender, discussed screening for colorectal cancer, prostate cancer, breast cancer, and cervical cancer. Checklist reviewing preventive services available has been given to the patient.    Reviewed patients plan of care and provided an AVS. The Basic Care Plan (routine screening as documented in Health Maintenance) for Leydi meets the Care Plan requirement. This Care Plan has been established and reviewed with the Patient.       Mary Leonardo, [facility]    Identified Health Risks:

## 2022-11-18 ENCOUNTER — OFFICE VISIT (OUTPATIENT)
Dept: FAMILY MEDICINE | Facility: CLINIC | Age: 67
End: 2022-11-18
Payer: COMMERCIAL

## 2022-11-18 ENCOUNTER — MYC MEDICAL ADVICE (OUTPATIENT)
Dept: FAMILY MEDICINE | Facility: CLINIC | Age: 67
End: 2022-11-18

## 2022-11-18 VITALS
WEIGHT: 133 LBS | RESPIRATION RATE: 16 BRPM | DIASTOLIC BLOOD PRESSURE: 68 MMHG | HEIGHT: 66 IN | OXYGEN SATURATION: 99 % | BODY MASS INDEX: 21.38 KG/M2 | TEMPERATURE: 97.3 F | HEART RATE: 64 BPM | SYSTOLIC BLOOD PRESSURE: 112 MMHG

## 2022-11-18 DIAGNOSIS — E04.1 THYROID NODULE: ICD-10-CM

## 2022-11-18 DIAGNOSIS — R91.1 PULMONARY NODULE: ICD-10-CM

## 2022-11-18 DIAGNOSIS — Z00.00 ENCOUNTER FOR MEDICARE ANNUAL WELLNESS EXAM: Primary | ICD-10-CM

## 2022-11-18 DIAGNOSIS — J32.4 CHRONIC PANSINUSITIS: ICD-10-CM

## 2022-11-18 DIAGNOSIS — M85.89 OSTEOPENIA OF MULTIPLE SITES: ICD-10-CM

## 2022-11-18 LAB
ALBUMIN SERPL BCG-MCNC: 4.3 G/DL (ref 3.5–5.2)
ALP SERPL-CCNC: 50 U/L (ref 35–104)
ALT SERPL W P-5'-P-CCNC: 17 U/L (ref 10–35)
ANION GAP SERPL CALCULATED.3IONS-SCNC: 12 MMOL/L (ref 7–15)
AST SERPL W P-5'-P-CCNC: 24 U/L (ref 10–35)
BILIRUB SERPL-MCNC: 0.4 MG/DL
BUN SERPL-MCNC: 25.2 MG/DL (ref 8–23)
CALCIUM SERPL-MCNC: 9.1 MG/DL (ref 8.8–10.2)
CHLORIDE SERPL-SCNC: 105 MMOL/L (ref 98–107)
CHOLEST SERPL-MCNC: 194 MG/DL
CREAT SERPL-MCNC: 0.75 MG/DL (ref 0.51–0.95)
DEPRECATED HCO3 PLAS-SCNC: 26 MMOL/L (ref 22–29)
GFR SERPL CREATININE-BSD FRML MDRD: 86 ML/MIN/1.73M2
GLUCOSE SERPL-MCNC: 98 MG/DL (ref 70–99)
HDLC SERPL-MCNC: 71 MG/DL
LDLC SERPL CALC-MCNC: 112 MG/DL
NONHDLC SERPL-MCNC: 123 MG/DL
POTASSIUM SERPL-SCNC: 5.2 MMOL/L (ref 3.4–5.3)
PROT SERPL-MCNC: 7.1 G/DL (ref 6.4–8.3)
SODIUM SERPL-SCNC: 143 MMOL/L (ref 136–145)
TRIGL SERPL-MCNC: 53 MG/DL
TSH SERPL DL<=0.005 MIU/L-ACNC: 3.68 UIU/ML (ref 0.3–4.2)

## 2022-11-18 PROCEDURE — 80061 LIPID PANEL: CPT | Performed by: FAMILY MEDICINE

## 2022-11-18 PROCEDURE — 80053 COMPREHEN METABOLIC PANEL: CPT | Performed by: FAMILY MEDICINE

## 2022-11-18 PROCEDURE — 99397 PER PM REEVAL EST PAT 65+ YR: CPT | Performed by: FAMILY MEDICINE

## 2022-11-18 PROCEDURE — 84443 ASSAY THYROID STIM HORMONE: CPT | Performed by: FAMILY MEDICINE

## 2022-11-18 PROCEDURE — 36415 COLL VENOUS BLD VENIPUNCTURE: CPT | Performed by: FAMILY MEDICINE

## 2022-11-18 ASSESSMENT — ACTIVITIES OF DAILY LIVING (ADL): CURRENT_FUNCTION: NO ASSISTANCE NEEDED

## 2022-11-18 ASSESSMENT — ENCOUNTER SYMPTOMS
JOINT SWELLING: 0
NERVOUS/ANXIOUS: 1
FEVER: 0
ARTHRALGIAS: 1
SHORTNESS OF BREATH: 0
DIZZINESS: 0
BREAST MASS: 0
NAUSEA: 0
DIARRHEA: 0
COUGH: 0
CONSTIPATION: 0
HEMATOCHEZIA: 0
WEAKNESS: 0
SORE THROAT: 0
EYE PAIN: 0
CHILLS: 0
PALPITATIONS: 0
HEADACHES: 0
HEMATURIA: 0
HEARTBURN: 0
DYSURIA: 0
MYALGIAS: 0
FREQUENCY: 1
PARESTHESIAS: 0
ABDOMINAL PAIN: 0

## 2022-11-18 ASSESSMENT — PAIN SCALES - GENERAL: PAINLEVEL: NO PAIN (0)

## 2022-11-18 NOTE — PATIENT INSTRUCTIONS
For scheduling at [facility] ([facility], [facility], [place]), call [phone] or [phone]     For scheduling in the [place] ([place], [facility], and [place]) call  [phone] or  [phone]        For scheduling in the South ([facility], [facility]) call [phone]  or   [phone]        Patient Education   Personalized Prevention Plan  You are due for the preventive services outlined below.  Your care team is available to assist you in scheduling these services.  If you have already completed any of these items, please share that information with your care team to update in your medical record.  Health Maintenance Due   Topic Date Due    ANNUAL REVIEW OF  ORDERS  Never done    Pneumococcal Vaccine (2 - PCV) 10/28/2021    COVID-19 Vaccine (4 - Booster for Pfizer series) 01/11/2022    Flu Vaccine (1) 09/01/2022    Annual Wellness Visit  11/16/2022       Signs of Hearing Loss      Hearing much better with one ear can be a sign of hearing loss.   Hearing loss is a problem shared by many people. In fact, it is one of the most common health problems, particularly as people age. Most people age 65 and older have some hearing loss. By age 80, almost everyone does. Hearing loss often occurs slowly over the years. So you may not realize your hearing has gotten worse.  Have your hearing checked  Call your healthcare provider if you:  Have to strain to hear normal conversation  Have to watch other people s faces very carefully to follow what they re saying  Need to ask people to repeat what they ve said  Often misunderstand what people are saying  Turn the volume of the television or radio up so high that others complain  Feel that people are mumbling when they re talking to you  Find that the effort to hear leaves you feeling tired and irritated  Notice, when using the phone, that you hear better with one ear than the other  [employer] last  reviewed this educational content on 1/1/2020 2000-2021 [employer]. All rights reserved. This information is not intended as a substitute for professional medical care. Always follow your healthcare professional's instructions.          Urinary Incontinence, Female (Adult)   Urinary incontinence means loss of bladder control. This problem affects many women, especially as they get older. If you have incontinence, you may be embarrassed to ask for help. But know that this problem can be treated.   Types of Incontinence  There are different types of incontinence. Two of the main types are described here. You can have more than one type.   Stress incontinence. With this type, urine leaks when pressure (stress) is put on the bladder. This may happen when you cough, sneeze, or laugh. Stress incontinence most often occurs because the pelvic floor muscles that support the bladder and urethra are weak. This can happen after pregnancy and vaginal childbirth or a hysterectomy. It can also be due to excess body weight or hormone changes.  Urge incontinence (also called overactive bladder). With this type, a sudden urge to urinate is felt often. This may happen even though there may not be much urine in the bladder. The need to urinate often during the night is common. Urge incontinence most often occurs because of bladder spasms. This may be due to bladder irritation or infection. Damage to bladder nerves or pelvic muscles, constipation, and certain medicines can also lead to urge incontinence.  Treatment depends on the cause. Further evaluation is needed to find the type you have. This will likely include an exam and certain tests. Based on the results, you and your healthcare provider can then plan treatment. Until a diagnosis is made, the home care tips below can help ease symptoms.   Home care  Do pelvic floor muscle exercises, if they are prescribed. The pelvic floor muscles help support the bladder and  urethra. Many women find that their symptoms improve when doing special exercises that strengthen these muscles. To do the exercises, contract the muscles you would use to stop your stream of urine. But do this when you re not urinating. Hold for 10 seconds, then relax. Repeat 10 to 20 times in a row, at least 3 times a day. Your healthcare provider may give you other instructions for how to do the exercises and how often.  Keep a bladder diary. This helps track how often and how much you urinate over a set period of time. Bring this diary with you to your next visit with the provider. The information can help your provider learn more about your bladder problem.  Lose weight, if advised to by your provider. Extra weight puts pressure on the bladder. Your provider can help you create a weight-loss plan that s right for you. This may include exercising more and making certain diet changes.  Don't have foods and drinks that may irritate the bladder. These can include alcohol and caffeinated drinks.  Quit smoking. Smoking and other tobacco use can lead to a long-term (chronic) cough that strains the pelvic floor muscles. Smoking may also damage the bladder and urethra. Talk with your provider about treatments or methods you can use to quit smoking.  If drinking large amounts of fluid makes you have symptoms, you may be advised to limit your fluid intake. You may also be advised to drink most of your fluids during the day and to limit fluids at night.  If you re worried about urine leakage or accidents, you may wear absorbent pads to catch urine. Change the pads often. This helps reduce discomfort. It may also reduce the risk of skin or bladder infections.    Follow-up care  Follow up with your healthcare provider, or as directed. It may take some to find the right treatment for your problem. But healthy lifestyle changes can be made right away. These include such things as exercising on a regular basis, eating a healthy  diet, losing weight (if needed), and quitting smoking. Your treatment plan may include special therapies or medicines. Certain procedures or surgery may also be options. Talk about any questions you have with your provider.   When to seek medical advice  Call the healthcare provider right away if any of these occur:  Fever of 100.4 F (38 C) or higher, or as directed by your provider  Bladder pain or fullness  Belly swelling  Nausea or vomiting  Back pain  Weakness, dizziness, or fainting  [employer] last reviewed this educational content on 1/1/2020 2000-2021 [employer]. All rights reserved. This information is not intended as a substitute for professional medical care. Always follow your healthcare professional's instructions.

## 2022-12-02 NOTE — RESULT ENCOUNTER NOTE
Dear Leydi,   Your test results are all back -   -Cholesterol levels (LDL,HDL, Triglycerides) are normal.  ADVISE: rechecking in 1 year.   -Liver and gallbladder tests are normal (ALT,AST, Alk phos, bilirubin), kidney function is normal (Cr, GFR), sodium is normal, potassium is normal, calcium is normal, glucose is normal.  -TSH (thyroid stimulating hormone) level is normal which indicates normal thyroid function.  Let us know if you have any questions.  -Mary Leonardo, DO

## 2022-12-14 ENCOUNTER — HOSPITAL ENCOUNTER (OUTPATIENT)
Dept: BONE DENSITY | Facility: CLINIC | Age: 67
Discharge: HOME | End: 2022-12-14
Attending: FAMILY MEDICINE | Admitting: FAMILY MEDICINE
Payer: COMMERCIAL

## 2022-12-14 DIAGNOSIS — M85.89 OSTEOPENIA OF MULTIPLE SITES: ICD-10-CM

## 2022-12-14 PROCEDURE — 77080 DXA BONE DENSITY AXIAL: CPT

## 2022-12-16 NOTE — RESULT ENCOUNTER NOTE
Dear Leydi,   Your test results are all back -   DEXA shows osteopenia or low bone density that does not meet criteria for osteoporosis.  Plan to recheck in 2-3 years.  Let us know if you have any questions.  -Mary Leonardo, DO

## 2023-01-01 NOTE — TELEPHONE ENCOUNTER
FUTURE VISIT INFORMATION      FUTURE VISIT INFORMATION:    Date: 4/22/22    Time: 2PM    [place] [facility] [place]  REFERRAL INFORMATION:    Referring provider:  Mary Leonardo DO    Referring providers clinic:  [facility]    Reason for visit/diagnosis  Chronic cough per Pt, Ref by Mary Leonardo DO in [facility], Recs in [facility]. [facility] [place] location verified.    RECORDS REQUESTED FROM:       Clinic name Comments Records Status Imaging Status    [facility] 2/11/22 note and referral from Mary Leonardo DO Epic    Imaging 11/5/21 XR Chest   11/4/2020 CT Chest  [facility] PACS   [facility] [place] 2/21/19 note from Dr Gio MARES                          
Statement Selected

## 2023-04-18 NOTE — PROGRESS NOTES
[facility] Dermatology Note  Encounter Date: Apr 19, 2023  Office Visit     Dermatology Problem List:  # BCC, L chest excised 3/11/21  - History of DN, >10 years ago, right arm without pigment recurrence  # Family history of melanoma, daughter (40s), did not require SLNB  # Benign bx  - Benign lichenoid keratosis, L upper back, s/p bx 4/13/22  ____________________________________________    Assessment & Plan:    # Lesion to monitor:  - Central upper back, ddx inflammatory v BLK less likely NMSC  - Photo today  - Recheck 6 months, can cancel if resolves    # Intermittent recurrent pink scaly macules on forearms.  - Based on photos and exam today, suspect BLKs  - Notify me for persistent or symptomatic lesions    # Multiple benign nevi.   # Fhx melanoma.  - No concerning lesions today --> monitor lentigo on anterior neck  - Monitor for ABCDEs of melanoma   - Continue sun protection - recommend SPF 30 or higher with frequent application   - Return sooner if noticing changing or symptomatic lesions    # Benign lesions - SKs, cherry angiomas, lentigenes.  - No treatment required    # History of NMSC. No evidence of recurrent disease.  - Continue photoprotection - recommend SPF 30 or higher with frequent reapplication  - Continue yearly skin exams  - Advised to monitor for changing, non-healing, bleeding, painful, changing, or otherwise symptomatic lesions    Procedures Performed:   None      Follow-up: 6 months, sooner if concerns.     Staff and Scribe:     I, Jani Ray, prepped today's note.      Provider Disclosure:   The documentation recorded by the scribe accurately reflects the services I personally performed and the decisions made by me.    Blossom Crowley MD    Department of Dermatology  [facility]: Phone: [phone], Fax: [phone]  4/19/2023      ____________________________________________    CC: Skin Check (Leydi is here today for a skin check. No concerns/)    HPI:  Ms. Leydi Zaragoza is a(n) 67 year old female who presents today as a return patient for FBSE. She was last seen on 4/13/22 at which time she underwent biopsy for left upper back returning for benign lichenoid keratosis.     Spots on arms - get bright red, then scabby, then heal up in last than 1 week.  Occur infrequently    Patient is otherwise feeling well, without additional skin concerns.    Labs Reviewed:  N/A    Physical Exam:  Vitals: LMP 09/15/2007   SKIN: Total skin excluding the undergarment areas was performed. The exam included the head/face, neck, both arms, chest, back, abdomen, both legs, digits and/or nails.   - central upper back nonspecific pink papule, no concerning features on dermoscopy  - reviewed photos of pink scaly macules on forearms, exam now with dull slightly erythematous macules with occ granular pigment  - central neck with medium brown even macule, dermoscopy with fingerprinting.  - There are dome shaped bright red papules on the trunk and extremities .   - Multiple regular brown pigmented macules and papules are identified on the trunk and extremities.   - Scattered brown macules on sun exposed areas.  - Waxy stuck on papules and plaques on trunk and extremities.   - No other lesions of concern on areas examined.     Medications:  Current Outpatient Medications   Medication     Calcium Carbonate-Vitamin D (CALCIUM 500 + D PO)     UNABLE TO FIND     No current facility-administered medications for this visit.      Past Medical History:   Patient Active Problem List   Diagnosis     Mucous polyp of cervix     CARDIOVASCULAR SCREENING; LDL GOAL LESS THAN 160     Advanced directives, counseling/discussion     Adenomatous colon polyp     Dysplastic nevus     Endometrial/uterine adenocarcinoma (H)     Thyroid nodule     Pulmonary nodule     Chronic pansinusitis      Past Medical History:   Diagnosis Date     Acute bronchospasm     one year but resolved - exercise induced.     Cancer (H) 2013    endometrioid andenocarcinoma     Uterine polyp         CC No referring provider defined for this encounter. on close of this encounter.

## 2023-04-19 ENCOUNTER — OFFICE VISIT (OUTPATIENT)
Dept: DERMATOLOGY | Facility: CLINIC | Age: 68
End: 2023-04-19
Payer: COMMERCIAL

## 2023-04-19 DIAGNOSIS — D22.9 MULTIPLE BENIGN NEVI: ICD-10-CM

## 2023-04-19 DIAGNOSIS — D18.01 CHERRY ANGIOMA: ICD-10-CM

## 2023-04-19 DIAGNOSIS — L82.1 SEBORRHEIC KERATOSIS: Primary | ICD-10-CM

## 2023-04-19 DIAGNOSIS — Z85.828 HISTORY OF NONMELANOMA SKIN CANCER: ICD-10-CM

## 2023-04-19 DIAGNOSIS — D49.2 NEOPLASM OF SKIN: ICD-10-CM

## 2023-04-19 DIAGNOSIS — Z80.8 FAMILY HISTORY OF MELANOMA: ICD-10-CM

## 2023-04-19 DIAGNOSIS — L81.4 LENTIGINES: ICD-10-CM

## 2023-04-19 PROCEDURE — 99213 OFFICE O/P EST LOW 20 MIN: CPT | Performed by: DERMATOLOGY

## 2023-04-19 ASSESSMENT — PAIN SCALES - GENERAL: PAINLEVEL: NO PAIN (0)

## 2023-04-19 NOTE — NURSING NOTE
Dermatology Rooming Note    Leydi Zaragoza's goals for this visit include:   Chief Complaint   Patient presents with     Skin Check     Leydi is here today for a skin check. No concerns       Aydee Calderon RN

## 2023-04-19 NOTE — LETTER
4/19/2023       RE: Leydi Zaragoza  [address]  [facility] [address]     Dear Colleague,    Thank you for referring your patient, Leydi Zaragoza, to the [facility] DERMATOLOGY CLINIC [place] at [facility]. Please see a copy of my visit note below.    [facility] Dermatology Note  Encounter Date: Apr 19, 2023  Office Visit     Dermatology Problem List:  # BCC, L chest excised 3/11/21  - History of DN, >10 years ago, right arm without pigment recurrence  # Family history of melanoma, daughter (40s), did not require SLNB  # Benign bx  - Benign lichenoid keratosis, L upper back, s/p bx 4/13/22  ____________________________________________    Assessment & Plan:    # Lesion to monitor:  - Central upper back, ddx inflammatory v BLK less likely NMSC  - Photo today  - Recheck 6 months, can cancel if resolves    # Intermittent recurrent pink scaly macules on forearms.  - Based on photos and exam today, suspect BLKs  - Notify me for persistent or symptomatic lesions    # Multiple benign nevi.   # Fhx melanoma.  - No concerning lesions today --> monitor lentigo on anterior neck  - Monitor for ABCDEs of melanoma   - Continue sun protection - recommend SPF 30 or higher with frequent application   - Return sooner if noticing changing or symptomatic lesions    # Benign lesions - SKs, cherry angiomas, lentigenes.  - No treatment required    # History of NMSC. No evidence of recurrent disease.  - Continue photoprotection - recommend SPF 30 or higher with frequent reapplication  - Continue yearly skin exams  - Advised to monitor for changing, non-healing, bleeding, painful, changing, or otherwise symptomatic lesions    Procedures Performed:   None      Follow-up: 6 months, sooner if concerns.     Staff and Scribe:     Jani NGUYEN, prepped today's note.      Provider Disclosure:   The documentation recorded by the scribe accurately  reflects the services I personally performed and the decisions made by me.    Blossom Crowley MD    Department of Dermatology  [facility]: Phone: [phone], Fax: [phone]  4/19/2023     ____________________________________________    CC: Skin Check (Leydi is here today for a skin check. No concerns/)    HPI:  Ms. Leydi Zaragoza is a(n) 67 year old female who presents today as a return patient for FBSE. She was last seen on 4/13/22 at which time she underwent biopsy for left upper back returning for benign lichenoid keratosis.     Spots on arms - get bright red, then scabby, then heal up in last than 1 week.  Occur infrequently    Patient is otherwise feeling well, without additional skin concerns.    Labs Reviewed:  N/A    Physical Exam:  Vitals: LMP 09/15/2007   SKIN: Total skin excluding the undergarment areas was performed. The exam included the head/face, neck, both arms, chest, back, abdomen, both legs, digits and/or nails.   - central upper back nonspecific pink papule, no concerning features on dermoscopy  - reviewed photos of pink scaly macules on forearms, exam now with dull slightly erythematous macules with occ granular pigment  - central neck with medium brown even macule, dermoscopy with fingerprinting.  - There are dome shaped bright red papules on the trunk and extremities .   - Multiple regular brown pigmented macules and papules are identified on the trunk and extremities.   - Scattered brown macules on sun exposed areas.  - Waxy stuck on papules and plaques on trunk and extremities.   - No other lesions of concern on areas examined.     Medications:  Current Outpatient Medications   Medication    Calcium Carbonate-Vitamin D (CALCIUM 500 + D PO)    UNABLE TO FIND     No current facility-administered medications for this visit.      Past Medical History:   Patient Active Problem List   Diagnosis     Mucous polyp of cervix    CARDIOVASCULAR SCREENING; LDL GOAL LESS THAN 160    Advanced directives, counseling/discussion    Adenomatous colon polyp    Dysplastic nevus    Endometrial/uterine adenocarcinoma (H)    Thyroid nodule    Pulmonary nodule    Chronic pansinusitis     Past Medical History:   Diagnosis Date    Acute bronchospasm     one year but resolved - exercise induced.    Cancer (H) 2013    endometrioid andenocarcinoma    Uterine polyp         CC No referring provider defined for this encounter. on close of this encounter.

## 2023-04-19 NOTE — PATIENT INSTRUCTIONS
Recheck spot on back in 6 months  Otherwise things look good    Benign lichenoid keratoses on arms???  Let me know if any persistent, growing, changing, painful, etc      Patient Education     Checking for Skin Cancer  You can find cancer early by checking your skin each month. There are 3 kinds of skin cancer. They are melanoma, basal cell carcinoma, and squamous cell carcinoma. Doing monthly skin checks is the best way to find new marks or skin changes. Follow the instructions below for checking your skin.   The ABCDEs of checking moles for melanoma   Check your moles or growths for signs of melanoma using ABCDE:   Asymmetry: the sides of the mole or growth don t match  Border: the edges are ragged, notched, or blurred  Color: the color within the mole or growth varies  Diameter: the mole or growth is larger than 6 mm (size of a pencil eraser)  Evolving: the size, shape, or color of the mole or growth is changing (evolving is not shown in the images below)    Checking for other types of skin cancer  Basal cell carcinoma or squamous cell carcinoma have symptoms such as:     A spot or mole that looks different from all other marks on your skin  Changes in how an area feels, such as itching, tenderness, or pain  Changes in the skin's surface, such as oozing, bleeding, or scaliness  A sore that does not heal  New swelling or redness beyond the border of a mole    Who s at risk?  Anyone can get skin cancer. But you are at greater risk if you have:   Fair skin, light-colored hair, or light-colored eyes  Many moles or abnormal moles on your skin  A history of sunburns from sunlight or tanning beds  A family history of skin cancer  A history of exposure to radiation or chemicals  A weakened immune system  If you have had skin cancer in the past, you are at risk for recurring skin cancer.   How to check your skin  Do your monthly skin checkups in front of a full-length mirror. Check all parts of your body, including your:    Head (ears, face, neck, and scalp)  Torso (front, back, and sides)  Arms (tops, undersides, upper, and lower armpits)  Hands (palms, backs, and fingers, including under the nails)  Buttocks and genitals  Legs (front, back, and sides)  Feet (tops, soles, toes, including under the nails, and between toes)  If you have a lot of moles, take digital photos of them each month. Make sure to take photos both up close and from a distance. These can help you see if any moles change over time.   Most skin changes are not cancer. But if you see any changes in your skin, call your doctor right away. Only he or she can diagnose a problem. If you have skin cancer, seeing your doctor can be the first step toward getting the treatment that could save your life.   [employer] last reviewed this educational content on 4/1/2019 2000-2020 [employer]. [address] [place] [address]. All rights reserved. This information is not intended as a substitute for professional medical care. Always follow your healthcare professional's instructions.       When should I call my doctor?  If you are worsening or not improving, please, contact us or seek urgent care as noted below.     Who should I call with questions (adults)?  [facility] (adult and pediatric): [phone]  [facility] (adult): [phone]  For urgent needs outside of business hours call the [facility] at [phone] and ask for the dermatology resident on call to be paged  If this is a medical emergency and you are unable to reach an ER, Call [phone]    Who should I call with questions (pediatric)?  [facility]- Pediatric Dermatology  Dr. Marie Everett, Dr. Corbin Wagner, Dr. Izzy Clark, RIGO Hou, Dr. Chiqui Meeks, Dr. Autumn Peacock & Dr. Romel Barnes  Non-urgent nurse triage line; [phone]- Savanna and Maggie CEJA Care Coordinators   Gisela  (/Complex ) [phone]    If you need a prescription refill, please contact your pharmacy. Refills are approved or denied by our Physicians during normal business hours, Monday through Fridays  Per office policy, refills will not be granted if you have not been seen within the past year (or sooner depending on your child's condition)    Scheduling Information:  Pediatric Appointment Scheduling and Call Center [phone]  Radiology Scheduling- [phone]  Sedation Unit Scheduling- [phone]  [place] Scheduling- General [phone]; Pediatric Dermatology [phone]  Main  Services: [phone]  Slovak: [phone]  Tongan: [phone]  Hmong/Maltese/Welsh: [phone]  Preadmission Nursing Department Fax Number: [phone] (Fax all pre-operative paperwork to this number)    For urgent matters arising during evenings, weekends, or holidays that cannot wait for normal business hours please call [phone] and ask for the dermatology resident on call to be paged.

## 2023-04-20 ENCOUNTER — TELEPHONE (OUTPATIENT)
Dept: DERMATOLOGY | Facility: CLINIC | Age: 68
End: 2023-04-20
Payer: COMMERCIAL

## 2023-04-20 NOTE — TELEPHONE ENCOUNTER
Pat. 1st attempt informing patient to call [phone] to schedule 6m follow up with   in Dermatology.

## 2023-06-09 ENCOUNTER — OFFICE VISIT (OUTPATIENT)
Dept: DERMATOLOGY | Facility: CLINIC | Age: 68
End: 2023-06-09
Payer: COMMERCIAL

## 2023-06-09 DIAGNOSIS — R21 RASH: Primary | ICD-10-CM

## 2023-06-09 PROCEDURE — 99213 OFFICE O/P EST LOW 20 MIN: CPT | Performed by: DERMATOLOGY

## 2023-06-09 ASSESSMENT — PAIN SCALES - GENERAL: PAINLEVEL: NO PAIN (0)

## 2023-06-09 NOTE — LETTER
6/9/2023       RE: Leydi Zaragoza  [address]  [facility] [address]     Dear Colleague,    Thank you for referring your patient, Leydi Zaragoza, to the [facility] DERMATOLOGY CLINIC [place] at [facility]. Please see a copy of my visit note below.    [facility] Dermatology Note  Encounter Date: Jun 9, 2023  Office Visit     Dermatology Problem List:  # BCC, L chest excised 3/11/21  - History of DN, >10 years ago, right arm without pigment recurrence  # Family history of melanoma, daughter (40s), did not require SLNB  # Benign bx  - Benign lichenoid keratosis, L upper back, s/p bx 4/13/22  ____________________________________________    Assessment & Plan:    # Pink papules, forearms.  At last visit wondered about BLKs but today favor more inflammatory process; given onset with sun exposure x2 this year and last year, possibly PMLE? Although no symptoms and self-resolving. Ddx arthropod, dermatitis, other. Offered bx for diagnostic clarification and topicals but she is not interested in these at this time, she will let me know if she changes her mind or if rash progresses.  - Sunscreens discussed and recs provided    # Lesion to monitor - RESOLVED  - Central upper back, ddx inflammatory v BLK less likely NMSC  - Resolved, suspect inflammatory, can plan for yearly skin check ~4/2024      Procedures Performed:   None      Follow-up: ~4/2024 for FBSE, sooner if concerns.     Staff:    Blossom Crowley MD    Department of Dermatology  [facility]: Phone: [phone], Fax: [phone]  6/14/2023    ____________________________________________    CC: Derm Problem (Leydi is here for rash on arms x1 week)    HPI:  Ms. Leydi Zaragoza is a(n) 67 year old female who presents today as a return patient for above.    Over the weekend, rash on  both arms.  No where else.  Was wearing sun shirt    Maybe happened last year too, was wearing sun shirt  No itching, no symptoms  Already improving    Patient is otherwise feeling well, without additional skin concerns.    Labs Reviewed:  N/A    Physical Exam:  Vitals: LMP 09/15/2007   SKIN: exam of forearms with multiple pink papules, photos of similar more exuberant rxn   - No other lesions of concern on areas examined.     Medications:  Current Outpatient Medications   Medication    UNABLE TO FIND     No current facility-administered medications for this visit.      Past Medical History:   Patient Active Problem List   Diagnosis    Mucous polyp of cervix    CARDIOVASCULAR SCREENING; LDL GOAL LESS THAN 160    Advanced directives, counseling/discussion    Adenomatous colon polyp    Dysplastic nevus    Endometrial/uterine adenocarcinoma (H)    Thyroid nodule    Pulmonary nodule    Chronic pansinusitis     Past Medical History:   Diagnosis Date    Acute bronchospasm     one year but resolved - exercise induced.    Cancer (H) 2013    endometrioid andenocarcinoma    Uterine polyp         CC No referring provider defined for this encounter. on close of this encounter.

## 2023-06-09 NOTE — NURSING NOTE
Dermatology Rooming Note    Leydi Zaragoza's goals for this visit include:   Chief Complaint   Patient presents with     Derm Problem     Leydi is here for rash on arms x1 week     Aydee Calderon RN

## 2023-06-09 NOTE — PATIENT INSTRUCTIONS
Mineral sunscreens  Zinc oxide  Titanium dioxde    Cerave AM - sheer tint  Elta MD      Neutrogena    Elta MD  La Roche posay   ISDIN    Tinted sunscreen

## 2023-06-09 NOTE — PROGRESS NOTES
[facility] Dermatology Note  Encounter Date: Jun 9, 2023  Office Visit     Dermatology Problem List:  # BCC, L chest excised 3/11/21  - History of DN, >10 years ago, right arm without pigment recurrence  # Family history of melanoma, daughter (40s), did not require SLNB  # Benign bx  - Benign lichenoid keratosis, L upper back, s/p bx 4/13/22  ____________________________________________    Assessment & Plan:    # Pink papules, forearms.  At last visit wondered about BLKs but today favor more inflammatory process; given onset with sun exposure x2 this year and last year, possibly PMLE? Although no symptoms and self-resolving. Ddx arthropod, dermatitis, other. Offered bx for diagnostic clarification and topicals but she is not interested in these at this time, she will let me know if she changes her mind or if rash progresses.  - Sunscreens discussed and recs provided    # Lesion to monitor - RESOLVED  - Central upper back, ddx inflammatory v BLK less likely NMSC  - Resolved, suspect inflammatory, can plan for yearly skin check ~4/2024      Procedures Performed:   None      Follow-up: ~4/2024 for FBSE, sooner if concerns.     Staff:    Blossom Crowley MD    Department of Dermatology  [facility]: Phone: [phone], Fax: [phone]  6/14/2023    ____________________________________________    CC: Derm Problem (Leydi is here for rash on arms x1 week)    HPI:  Ms. Leydi Zaragoza is a(n) 67 year old female who presents today as a return patient for above.    Over the weekend, rash on both arms.  No where else.  Was wearing sun shirt    Maybe happened last year too, was wearing sun shirt  No itching, no symptoms  Already improving    Patient is otherwise feeling well, without additional skin concerns.    Labs Reviewed:  N/A    Physical Exam:  Vitals: LMP 09/15/2007   SKIN: exam of forearms with multiple  pink papules, photos of similar more exuberant rxn   - No other lesions of concern on areas examined.     Medications:  Current Outpatient Medications   Medication     UNABLE TO FIND     No current facility-administered medications for this visit.      Past Medical History:   Patient Active Problem List   Diagnosis     Mucous polyp of cervix     CARDIOVASCULAR SCREENING; LDL GOAL LESS THAN 160     Advanced directives, counseling/discussion     Adenomatous colon polyp     Dysplastic nevus     Endometrial/uterine adenocarcinoma (H)     Thyroid nodule     Pulmonary nodule     Chronic pansinusitis     Past Medical History:   Diagnosis Date     Acute bronchospasm     one year but resolved - exercise induced.     Cancer (H) 2013    endometrioid andenocarcinoma     Uterine polyp         CC No referring provider defined for this encounter. on close of this encounter.

## 2023-07-18 ENCOUNTER — TRANSFERRED RECORDS (OUTPATIENT)
Dept: HEALTH INFORMATION MANAGEMENT | Facility: CLINIC | Age: 68
End: 2023-07-18
Payer: COMMERCIAL

## 2023-08-24 NOTE — NURSING NOTE
"Chief Complaint   Patient presents with     RECHECK     initial BP (!) 155/82 (BP Location: Left arm, Cuff Size: Adult Regular)   Pulse 66   Temp 98.4  F (36.9  C) (Oral)   Resp 16   Ht 1.676 m (5' 6\")   Wt 62.1 kg (137 lb)   LMP 09/15/2007   SpO2 97%   BMI 22.11 kg/m   Estimated body mass index is 22.11 kg/m  as calculated from the following:    Height as of this encounter: 1.676 m (5' 6\").    Weight as of this encounter: 62.1 kg (137 lb).  BP completed using cuff size: regular.  L  arm      Health Maintenance that is potentially due pending provider review:  NONE    n/a    Jerardo Leong ma  " [NI] : Normal [de-identified] : Please see scanned in breast sheet SN-N (measured from sternal notch to incision scar); L-22, R-20 N-IMF (measured from incision scar to IMF); L-11.5, R-13.5 BW L/R: 14

## 2023-09-26 ENCOUNTER — PATIENT OUTREACH (OUTPATIENT)
Dept: CARE COORDINATION | Facility: CLINIC | Age: 68
End: 2023-09-26
Payer: COMMERCIAL

## 2023-10-24 ENCOUNTER — PATIENT OUTREACH (OUTPATIENT)
Dept: CARE COORDINATION | Facility: CLINIC | Age: 68
End: 2023-10-24
Payer: COMMERCIAL

## 2023-10-27 ENCOUNTER — ANCILLARY PROCEDURE (OUTPATIENT)
Dept: MAMMOGRAPHY | Facility: CLINIC | Age: 68
End: 2023-10-27
Attending: FAMILY MEDICINE
Payer: COMMERCIAL

## 2023-10-27 DIAGNOSIS — Z12.31 VISIT FOR SCREENING MAMMOGRAM: ICD-10-CM

## 2023-10-27 PROCEDURE — 77063 BREAST TOMOSYNTHESIS BI: CPT | Mod: TC | Performed by: RADIOLOGY

## 2023-10-27 PROCEDURE — 77067 SCR MAMMO BI INCL CAD: CPT | Mod: TC | Performed by: RADIOLOGY

## 2023-11-20 ASSESSMENT — ENCOUNTER SYMPTOMS
CONSTIPATION: 0
NERVOUS/ANXIOUS: 0
HEARTBURN: 0
ARTHRALGIAS: 1
JOINT SWELLING: 0
HEMATURIA: 0
DIARRHEA: 0
FREQUENCY: 0
MYALGIAS: 0
PARESTHESIAS: 0
HEADACHES: 0
DYSURIA: 0
EYE PAIN: 0
ABDOMINAL PAIN: 0
BREAST MASS: 0
PALPITATIONS: 0
CHILLS: 0
SHORTNESS OF BREATH: 0
NAUSEA: 0
WEAKNESS: 0
DIZZINESS: 0
SORE THROAT: 0
COUGH: 0
HEMATOCHEZIA: 0
FEVER: 0

## 2023-11-20 ASSESSMENT — ACTIVITIES OF DAILY LIVING (ADL): CURRENT_FUNCTION: NO ASSISTANCE NEEDED

## 2023-11-21 ENCOUNTER — OFFICE VISIT (OUTPATIENT)
Dept: FAMILY MEDICINE | Facility: CLINIC | Age: 68
End: 2023-11-21
Payer: COMMERCIAL

## 2023-11-21 VITALS
TEMPERATURE: 97.9 F | OXYGEN SATURATION: 97 % | BODY MASS INDEX: 22.1 KG/M2 | RESPIRATION RATE: 16 BRPM | HEART RATE: 65 BPM | WEIGHT: 137.5 LBS | HEIGHT: 66 IN | SYSTOLIC BLOOD PRESSURE: 151 MMHG | DIASTOLIC BLOOD PRESSURE: 82 MMHG

## 2023-11-21 DIAGNOSIS — C54.1 ENDOMETRIAL/UTERINE ADENOCARCINOMA (H): ICD-10-CM

## 2023-11-21 DIAGNOSIS — R03.0 ELEVATED BLOOD PRESSURE READING WITHOUT DIAGNOSIS OF HYPERTENSION: ICD-10-CM

## 2023-11-21 DIAGNOSIS — E04.1 THYROID NODULE: ICD-10-CM

## 2023-11-21 DIAGNOSIS — Z00.00 ENCOUNTER FOR MEDICARE ANNUAL WELLNESS EXAM: Primary | ICD-10-CM

## 2023-11-21 LAB
ALBUMIN SERPL BCG-MCNC: 4.3 G/DL (ref 3.5–5.2)
ALP SERPL-CCNC: 55 U/L (ref 40–150)
ALT SERPL W P-5'-P-CCNC: 15 U/L (ref 0–50)
ANION GAP SERPL CALCULATED.3IONS-SCNC: 8 MMOL/L (ref 7–15)
AST SERPL W P-5'-P-CCNC: 21 U/L (ref 0–45)
BILIRUB SERPL-MCNC: 0.4 MG/DL
BUN SERPL-MCNC: 16.8 MG/DL (ref 8–23)
CALCIUM SERPL-MCNC: 9.4 MG/DL (ref 8.8–10.2)
CHLORIDE SERPL-SCNC: 106 MMOL/L (ref 98–107)
CHOLEST SERPL-MCNC: 202 MG/DL
CREAT SERPL-MCNC: 0.73 MG/DL (ref 0.51–0.95)
CREAT UR-MCNC: 147 MG/DL
DEPRECATED HCO3 PLAS-SCNC: 29 MMOL/L (ref 22–29)
EGFRCR SERPLBLD CKD-EPI 2021: 88 ML/MIN/1.73M2
GLUCOSE SERPL-MCNC: 91 MG/DL (ref 70–99)
HDLC SERPL-MCNC: 73 MG/DL
LDLC SERPL CALC-MCNC: 118 MG/DL
MICROALBUMIN UR-MCNC: <12 MG/L
MICROALBUMIN/CREAT UR: NORMAL MG/G{CREAT}
NONHDLC SERPL-MCNC: 129 MG/DL
POTASSIUM SERPL-SCNC: 4.7 MMOL/L (ref 3.4–5.3)
PROT SERPL-MCNC: 7.1 G/DL (ref 6.4–8.3)
SODIUM SERPL-SCNC: 143 MMOL/L (ref 135–145)
TRIGL SERPL-MCNC: 54 MG/DL
TSH SERPL DL<=0.005 MIU/L-ACNC: 2.86 UIU/ML (ref 0.3–4.2)

## 2023-11-21 PROCEDURE — 80053 COMPREHEN METABOLIC PANEL: CPT | Performed by: FAMILY MEDICINE

## 2023-11-21 PROCEDURE — 36415 COLL VENOUS BLD VENIPUNCTURE: CPT | Performed by: FAMILY MEDICINE

## 2023-11-21 PROCEDURE — 82570 ASSAY OF URINE CREATININE: CPT | Performed by: FAMILY MEDICINE

## 2023-11-21 PROCEDURE — 82043 UR ALBUMIN QUANTITATIVE: CPT | Performed by: FAMILY MEDICINE

## 2023-11-21 PROCEDURE — 80061 LIPID PANEL: CPT | Performed by: FAMILY MEDICINE

## 2023-11-21 PROCEDURE — 84443 ASSAY THYROID STIM HORMONE: CPT | Performed by: FAMILY MEDICINE

## 2023-11-21 PROCEDURE — G0439 PPPS, SUBSEQ VISIT: HCPCS | Performed by: FAMILY MEDICINE

## 2023-11-21 RX ORDER — RESPIRATORY SYNCYTIAL VIRUS VACCINE 120MCG/0.5
0.5 KIT INTRAMUSCULAR ONCE
Qty: 1 EACH | Refills: 0 | Status: CANCELLED | OUTPATIENT
Start: 2023-11-21 | End: 2023-11-21

## 2023-11-21 ASSESSMENT — ANXIETY QUESTIONNAIRES
3. WORRYING TOO MUCH ABOUT DIFFERENT THINGS: NOT AT ALL
GAD7 TOTAL SCORE: 0
IF YOU CHECKED OFF ANY PROBLEMS ON THIS QUESTIONNAIRE, HOW DIFFICULT HAVE THESE PROBLEMS MADE IT FOR YOU TO DO YOUR WORK, TAKE CARE OF THINGS AT HOME, OR GET ALONG WITH OTHER PEOPLE: NOT DIFFICULT AT ALL
6. BECOMING EASILY ANNOYED OR IRRITABLE: NOT AT ALL
7. FEELING AFRAID AS IF SOMETHING AWFUL MIGHT HAPPEN: NOT AT ALL
2. NOT BEING ABLE TO STOP OR CONTROL WORRYING: NOT AT ALL
5. BEING SO RESTLESS THAT IT IS HARD TO SIT STILL: NOT AT ALL
GAD7 TOTAL SCORE: 0
1. FEELING NERVOUS, ANXIOUS, OR ON EDGE: NOT AT ALL
4. TROUBLE RELAXING: NOT AT ALL

## 2023-11-21 ASSESSMENT — ENCOUNTER SYMPTOMS
PALPITATIONS: 0
ABDOMINAL PAIN: 0
HEARTBURN: 0
MYALGIAS: 0
SHORTNESS OF BREATH: 0
FEVER: 0
HEMATOCHEZIA: 0
DIARRHEA: 0
NERVOUS/ANXIOUS: 0
NAUSEA: 0
WEAKNESS: 0
COUGH: 0
SORE THROAT: 0
FREQUENCY: 0
PARESTHESIAS: 0
ARTHRALGIAS: 1
EYE PAIN: 0
DYSURIA: 0
BREAST MASS: 0
CONSTIPATION: 0
DIZZINESS: 0
HEADACHES: 0
HEMATURIA: 0
JOINT SWELLING: 0
CHILLS: 0

## 2023-11-21 ASSESSMENT — ACTIVITIES OF DAILY LIVING (ADL): CURRENT_FUNCTION: NO ASSISTANCE NEEDED

## 2023-11-21 ASSESSMENT — PATIENT HEALTH QUESTIONNAIRE - PHQ9
10. IF YOU CHECKED OFF ANY PROBLEMS, HOW DIFFICULT HAVE THESE PROBLEMS MADE IT FOR YOU TO DO YOUR WORK, TAKE CARE OF THINGS AT HOME, OR GET ALONG WITH OTHER PEOPLE: NOT DIFFICULT AT ALL
SUM OF ALL RESPONSES TO PHQ QUESTIONS 1-9: 1
SUM OF ALL RESPONSES TO PHQ QUESTIONS 1-9: 1

## 2023-11-21 ASSESSMENT — PAIN SCALES - GENERAL: PAINLEVEL: NO PAIN (0)

## 2023-11-21 NOTE — PATIENT INSTRUCTIONS
Patient Education   Personalized Prevention Plan  You are due for the preventive services outlined below.  Your care team is available to assist you in scheduling these services.  If you have already completed any of these items, please share that information with your care team to update in your medical record.  Health Maintenance Due   Topic Date Due     RSV VACCINE (Pregnancy & 60+) (1 - 1-dose 60+ series) Never done     Pneumococcal Vaccine (2 - PCV) 10/28/2021     Flu Vaccine (1) 09/01/2023     COVID-19 Vaccine (5 - 2023-24 season) 09/01/2023     ANNUAL REVIEW OF HM ORDERS  11/18/2023     Annual Wellness Visit  11/18/2023     Hearing Loss: Care Instructions  Overview     Hearing loss is a sudden or slow decrease in how well you hear. It can range from slight to profound. Permanent hearing loss can occur with aging. It also can happen when you are exposed long-term to loud noise. Examples include listening to loud music, riding motorcycles, or being around other loud machines.  Hearing loss can affect your work and home life. It can make you feel lonely or depressed. You may feel that you have lost your independence. But hearing aids and other devices can help you hear better and feel connected to others.  Follow-up care is a key part of your treatment and safety. Be sure to make and go to all appointments, and call your doctor if you are having problems. It's also a good idea to know your test results and keep a list of the medicines you take.  How can you care for yourself at home?  Avoid loud noises whenever possible. This helps keep your hearing from getting worse.  Always wear hearing protection around loud noises.  Wear a hearing aid as directed.  A professional can help you pick a hearing aid that will work best for you.  You can also get hearing aids over the counter for mild to moderate hearing loss.  Have hearing tests as your doctor suggests. They can show whether your hearing has changed. Your  "hearing aid may need to be adjusted.  Use other devices as needed. These may include:  Telephone amplifiers and hearing aids that can connect to a television, stereo, radio, or microphone.  Devices that use lights or vibrations. These alert you to the doorbell, a ringing telephone, or a baby monitor.  Television closed-captioning. This shows the words at the bottom of the screen. Most new TVs can do this.  TTY (text telephone). This lets you type messages back and forth on the telephone instead of talking or listening. These devices are also called TDD. When messages are typed on the keyboard, they are sent over the phone line to a receiving TTY. The message is shown on a monitor.  Use text messaging, social media, and email if it is hard for you to communicate by telephone.  Try to learn a listening technique called speechreading. It is not lipreading. You pay attention to people's gestures, expressions, posture, and tone of voice. These clues can help you understand what a person is saying. Face the person you are talking to, and have them face you. Make sure the lighting is good. You need to see the other person's face clearly.  Think about counseling if you need help to adjust to your hearing loss.  When should you call for help?  Watch closely for changes in your health, and be sure to contact your doctor if:    You think your hearing is getting worse.     You have new symptoms, such as dizziness or nausea.   Where can you learn more?  Go to https://www.[email or web address].net/patiented  Enter R798 in the search box to learn more about \"Hearing Loss: Care Instructions.\"  Current as of: February 28, 2023               Content Version: 13.8    7060-0518 [employer].   Care instructions adapted under license by your healthcare professional. If you have questions about a medical condition or this instruction, always ask your healthcare professional. [employer] disclaims any warranty or liability " for your use of this information.      Bladder Training: Care Instructions  Your Care Instructions     Bladder training is used to treat urge incontinence and stress incontinence. Urge incontinence means that the need to urinate comes on so fast that you can't get to a toilet in time. Stress incontinence means that you leak urine because of pressure on your bladder. For example, it may happen when you laugh, cough, or lift something heavy.  Bladder training can increase how long you can wait before you have to urinate. It can also help your bladder hold more urine. And it can give you better control over the urge to urinate.  It is important to remember that bladder training takes a few weeks to a few months to make a difference. You may not see results right away, but don't give up.  Follow-up care is a key part of your treatment and safety. Be sure to make and go to all appointments, and call your doctor if you are having problems. It's also a good idea to know your test results and keep a list of the medicines you take.  How can you care for yourself at home?  Work with your doctor to come up with a bladder training program that is right for you. You may use one or more of the following methods.  Delayed urination  In the beginning, try to keep from urinating for 5 minutes after you first feel the need to go.  While you wait, take deep, slow breaths to relax. Kegel exercises can also help you delay the need to go to the bathroom.  After some practice, when you can easily wait 5 minutes to urinate, try to wait 10 minutes before you urinate.  Slowly increase the waiting period until you are able to control when you have to urinate.  Scheduled urination  Empty your bladder when you first wake up in the morning.  Schedule times throughout the day when you will urinate.  Start by going to the bathroom every hour, even if you don't need to go.  Slowly increase the time between trips to the bathroom.  When you have  "found a schedule that works well for you, keep doing it.  If you wake up during the night and have to urinate, do it. Apply your schedule to waking hours only.  Kegel exercises  These tighten and strengthen pelvic muscles, which can help you control the flow of urine. (If doing these exercises causes pain, stop doing them and talk with your doctor.) To do Kegel exercises:  Squeeze your muscles as if you were trying not to pass gas. Or squeeze your muscles as if you were stopping the flow of urine. Your belly, legs, and buttocks shouldn't move.  Hold the squeeze for 3 seconds, then relax for 5 to 10 seconds.  Start with 3 seconds, then add 1 second each week until you are able to squeeze for 10 seconds.  Repeat the exercise 10 times a session. Do 3 to 8 sessions a day.  When should you call for help?  Watch closely for changes in your health, and be sure to contact your doctor if:    Your incontinence is getting worse.     You do not get better as expected.   Where can you learn more?  Go to https://www.[employer].net/patiented  Enter V684 in the search box to learn more about \"Bladder Training: Care Instructions.\"  Current as of: February 28, 2023               Content Version: 13.8    9308-3387 [employer].   Care instructions adapted under license by your healthcare professional. If you have questions about a medical condition or this instruction, always ask your healthcare professional. [employer] disclaims any warranty or liability for your use of this information.         "

## 2023-11-21 NOTE — PROGRESS NOTES
"   SUBJECTIVE:   Leydi is a 68 year old, presenting for the following:  Physical    {(!) Visit Details have not yet been documented.  Please enter Visit Details and then use this list to pull in documentation. (Optional):795134}    Healthy Habits:     In general, how would you rate your overall health?  Excellent    Frequency of exercise:  2-3 days/week    Duration of exercise:  45-60 minutes    Do you usually eat at least 4 servings of fruit and vegetables a day, include whole grains    & fiber and avoid regularly eating high fat or \"junk\" foods?  Yes    Taking medications regularly:  Yes    Medication side effects:  Not applicable    Ability to successfully perform activities of daily living:  No assistance needed    Home Safety:  No safety concerns identified    Hearing Impairment:  Difficulty following a conversation in a noisy restaurant or crowded room    In the past 6 months, have you been bothered by leaking of urine? Yes    In general, how would you rate your overall mental or emotional health?  Excellent    Additional concerns today:  No  Answers submitted by the patient for this visit:  Patient Health Questionnaire (Submitted on 11/21/2023)  If you checked off any problems, how difficult have these problems made it for you to do your work, take care of things at home, or get along with other people?: Not difficult at all  PHQ9 TOTAL SCORE: 1  BONY-7 (Submitted on 11/21/2023)  BONY 7 TOTAL SCORE: 0      Today's PHQ-9 Score:       11/21/2023     7:35 AM   PHQ-9 SCORE   PHQ-9 Total Score MyChart 1 (Minimal depression)   PHQ-9 Total Score 1           {Add if <65 person on Medicare  - Required Questions (Optional):259469}  {Outside tests to abstract? (Optional):968002}    {additional problems to add (Optional):727101}      Social History     Tobacco Use    Smoking status: Never    Smokeless tobacco: Never   Substance Use Topics    Alcohol use: Yes     Alcohol/week: 0.0 standard drinks of alcohol     Comment: " Rare, 1 per week     {Rooming staff  Click this link to complete the Prescreen if response below is not for today's visit  Alcohol Use Prescreen >3 drinks/day or > 7 drinks/week.  If the prescreen question answer is YES, complete the full AUDIT  :798964}        11/20/2023     5:28 AM   Alcohol Use   Prescreen: >3 drinks/day or >7 drinks/week? No       Reviewed orders with patient.  Reviewed health maintenance and updated orders accordingly - { :651424}  {Chronicprobdata (optional):835968}    Breast Cancer Screening:        History of abnormal Pap smear: { :721942}      10/24/2013    10:17 AM 10/20/2010    12:00 AM 10/23/2007    12:00 AM   PAP / HPV   PAP (Historical) NIL  NIL  NIL      Reviewed and updated as needed this visit by clinical staff                  Reviewed and updated as needed this visit by Provider                 {HISTORY OPTIONS (Optional):943191}    Review of Systems   Constitutional:  Negative for chills and fever.   HENT:  Negative for congestion, ear pain, hearing loss and sore throat.    Eyes:  Positive for visual disturbance. Negative for pain.   Respiratory:  Negative for cough and shortness of breath.    Cardiovascular:  Negative for chest pain, palpitations and peripheral edema.   Gastrointestinal:  Negative for abdominal pain, constipation, diarrhea, heartburn, hematochezia and nausea.   Breasts:  Negative for tenderness, breast mass and discharge.   Genitourinary:  Positive for urgency. Negative for dysuria, frequency, genital sores, hematuria, pelvic pain, vaginal bleeding and vaginal discharge.   Musculoskeletal:  Positive for arthralgias. Negative for joint swelling and myalgias.   Skin:  Negative for rash.   Neurological:  Negative for dizziness, weakness, headaches and paresthesias.   Psychiatric/Behavioral:  Negative for mood changes. The patient is not nervous/anxious.      {FEMALE ROS (Optional):208953}     OBJECTIVE:   LMP 09/15/2007   Physical Exam  {Exam Choices  "(Optional):014564}    {Diagnostic Test Results (Optional):087637}    ASSESSMENT/PLAN:   {Diag Picklist:880711}    {Patient advised of split billing (Optional):869471}      COUNSELING:  {FEMALE COUNSELING MESSAGES:081298::\"Reviewed preventive health counseling, as reflected in patient instructions\"}        She reports that she has never smoked. She has never used smokeless tobacco.      {Counseling Resources  [place] Preventive Services Task Force  Cholesterol Screening  Health diet/nutrition  Pooled Cohorts Equation Calculator  Audit Verify's MyPlate  ASA Prophylaxis  Lung CA Screening  Osteoporosis prevention/bone health :705688}  {Breast Cancer Risk Calculator  BRCA-Related Cancer Risk Assessment FHS-7 Tool :982820}  Mary Leonardo, DO  [facility]  "

## 2023-11-21 NOTE — PROGRESS NOTES
"SUBJECTIVE:   Leydi is a 68 year old, presenting for the following:  Physical        11/21/2023     7:53 AM   Additional Questions   Roomed by Alanna       Are you in the first 12 months of your Medicare coverage?  No    Healthy Habits:     In general, how would you rate your overall health?  Excellent    Frequency of exercise:  2-3 days/week    Duration of exercise:  45-60 minutes    Do you usually eat at least 4 servings of fruit and vegetables a day, include whole grains    & fiber and avoid regularly eating high fat or \"junk\" foods?  Yes    Taking medications regularly:  Yes    Medication side effects:  Not applicable    Ability to successfully perform activities of daily living:  No assistance needed    Home Safety:  No safety concerns identified    Hearing Impairment:  Difficulty following a conversation in a noisy restaurant or crowded room    In the past 6 months, have you been bothered by leaking of urine? Yes    In general, how would you rate your overall mental or emotional health?  Excellent    Additional concerns today:  No    Answers submitted by the patient for this visit:  Patient Health Questionnaire (Submitted on 11/21/2023)  If you checked off any problems, how difficult have these problems made it for you to do your work, take care of things at home, or get along with other people?: Not difficult at all  PHQ9 TOTAL SCORE: 1  BONY-7 (Submitted on 11/21/2023)  BONY 7 TOTAL SCORE: 0    Today's PHQ-9 Score:       11/21/2023     7:35 AM   PHQ-9 SCORE   PHQ-9 Total Score MyChart 1 (Minimal depression)   PHQ-9 Total Score 1       The 10-year ASCVD risk score (Tiera HERNANDEZ, et al., 2019) is: 12.2%    Values used to calculate the score:      Age: 68 years      Sex: Female      Is Non- : No      Diabetic: No      Tobacco smoker: No      Systolic Blood Pressure: 151 mmHg      Is BP treated: No      HDL Cholesterol: 71 mg/dL      Total Cholesterol: 194 mg/dL     Have you ever done Advance " Care Planning? (For example, a Health Directive, POLST, or a discussion with a medical provider or your loved ones about your wishes): No, advance care planning information given to patient to review.  Patient declined advance care planning discussion at this time.       Fall risk  Fallen 2 or more times in the past year?: No  Any fall with injury in the past year?: No    Cognitive Screening   1) Repeat 3 items (Leader, Season, Table)    2) Clock draw: NORMAL  3) 3 item recall: Recalls 3 objects  Results: 3 items recalled: COGNITIVE IMPAIRMENT LESS LIKELY    Mini-CogTM Copyright S [employer]. Licensed by the author for use in [facility]; reprinted with permission ([email or web address]@[email or web address]). All rights reserved.          Reviewed and updated as needed this visit by clinical staff   Tobacco  Allergies  Meds  Problems  Med Hx  Surg Hx  Fam Hx          Reviewed and updated as needed this visit by Provider   Tobacco  Allergies  Meds  Problems  Med Hx  Surg Hx  Fam Hx         Social History     Tobacco Use     Smoking status: Never     Smokeless tobacco: Never   Substance Use Topics     Alcohol use: Yes     Comment: Rare, 1 per week             11/20/2023     5:28 AM   Alcohol Use   Prescreen: >3 drinks/day or >7 drinks/week? No       Do you have a current opioid prescription? No  Do you use any other controlled substances or medications that are not prescribed by a provider? None          -------------------------------------    Current providers sharing in care for this patient include:    Patient Care Team:  Mary Leonardo DO as PCP - General  Mary Leonardo DO as Assigned PCP  Mary Rousseau PsyD as Psychologist (Psychology)  Ciera Estevez AuD as Audiologist (Audiology)  Antonio Sloan MD as MD (Otolaryngology)  Stephy Bond MD as MD (Otolaryngology)  Blossom Crowley MD as Assigned Surgical Provider  Andreina Riggins PA-C as Physician Assistant (Dermatology)    The following health  maintenance items are reviewed in Epic and correct as of today:  Health Maintenance   Topic Date Due     Pneumococcal Vaccine: 65+ Years (2 - PCV) 10/28/2021     INFLUENZA VACCINE (1) 09/01/2023     COVID-19 Vaccine (5 - 2023-24 season) 09/01/2023     ANNUAL REVIEW OF HM ORDERS  11/18/2023     MEDICARE ANNUAL WELLNESS VISIT  11/18/2023     RSV VACCINE (Pregnancy & 60+) (1 - 1-dose 60+ series) 11/21/2024 (Originally 7/1/2013)     MAMMO SCREENING  10/27/2024     FALL RISK ASSESSMENT  11/21/2024     DTAP/TDAP/TD IMMUNIZATION (3 - Td or Tdap) 01/05/2026     COLORECTAL CANCER SCREENING  11/01/2026     LIPID  11/18/2027     ADVANCE CARE PLANNING  11/21/2028     DEXA  12/14/2037     HEPATITIS C SCREENING  Completed     PHQ-2 (once per calendar year)  Completed     ZOSTER IMMUNIZATION  Completed     IPV IMMUNIZATION  Aged Out     HPV IMMUNIZATION  Aged Out     MENINGITIS IMMUNIZATION  Aged Out     RSV MONOCLONAL ANTIBODY  Aged Out     Patient Active Problem List   Diagnosis     Mucous polyp of cervix     CARDIOVASCULAR SCREENING; LDL GOAL LESS THAN 160     Advanced directives, counseling/discussion     Adenomatous colon polyp     Dysplastic nevus     Endometrial/uterine adenocarcinoma (H)     Thyroid nodule     Pulmonary nodule     Chronic pansinusitis     Past Surgical History:   Procedure Laterality Date     COLONOSCOPY  10/11/11     DILATION AND CURETTAGE, OPERATIVE HYSTEROSCOPY WITH MORCELLATOR, COMBINED  6/20/2013    Procedure: COMBINED DILATION AND CURETTAGE, OPERATIVE HYSTEROSCOPY WITH MORCELLATOR;  Dilation And Curettage, Operative Hysteroscopy With Myosure, Morcellation of endometrial polyps.;  Surgeon: Jaylene Jacobson MD;  Location: [facility] OR     HC TOOTH EXTRACTION W/FORCEP       LAPAROSCOPIC HYSTERECTOMY TOTAL, BILATERAL SALPINGO-OOPHORECTOMY, NODE DISSECTION, COMBINED  7/1/2013    Procedure: COMBINED LAPAROSCOPIC HYSTERECTOMY TOTAL, SALPINGO-OOPHORECTOMY, NODE DISSECTION;  Total Laparoscopic Hysterectomy,  Bilateral Salpingo Oophorectomy, Cystoscopy;  Surgeon: Jaelyn Khan MD;  Location:  OR       Social History     Tobacco Use     Smoking status: Never     Smokeless tobacco: Never   Substance Use Topics     Alcohol use: Yes     Comment: Rare, 1 per week     Family History   Problem Relation Age of Onset     C.A.D. Mother         stents at age 78, passed away 1/30/10     Diabetes Mother      Hypertension Mother      Heart Disease Mother         Uses heart medication for Tachycardia, has had stent placement 2 at once     Arthritis Mother      Gastrointestinal Disease Mother         bowel obstruction - passed away age 86     Hypertension Father      Asthma Father      Heart Disease Father      Neurologic Disorder Daughter         astrocytoma     Melanoma Daughter      Connective Tissue Disorder Daughter         jra, in remission, melonoma     Cancer Daughter         melanoma     Other - See Comments Daughter         atypical ductal hyperplasia     Cancer Daughter      Cancer Daughter      Diabetes Brother      Hypertension Brother      Skin Cancer No family hx of                  Review of Systems   Constitutional:  Negative for chills and fever.   HENT:  Negative for congestion, ear pain, hearing loss and sore throat.    Eyes:  Positive for visual disturbance. Negative for pain.   Respiratory:  Negative for cough and shortness of breath.    Cardiovascular:  Negative for chest pain, palpitations and peripheral edema.   Gastrointestinal:  Negative for abdominal pain, constipation, diarrhea, heartburn, hematochezia and nausea.   Breasts:  Negative for tenderness, breast mass and discharge.   Genitourinary:  Positive for urgency. Negative for dysuria, frequency, genital sores, hematuria, pelvic pain, vaginal bleeding and vaginal discharge.   Musculoskeletal:  Positive for arthralgias. Negative for joint swelling and myalgias.   Skin:  Negative for rash.   Neurological:  Negative for dizziness, weakness, headaches and  "paresthesias.   Psychiatric/Behavioral:  Negative for mood changes. The patient is not nervous/anxious.          OBJECTIVE:   BP (!) 151/82 (BP Location: Left arm, Patient Position: Sitting, Cuff Size: Adult Regular)   Pulse 65   Temp 97.9  F (36.6  C) (Temporal)   Resp 16   Ht 1.67 m (5' 5.75\")   Wt 62.4 kg (137 lb 8 oz)   LMP 09/15/2007   SpO2 97%   Breastfeeding No   BMI 22.36 kg/m   Estimated body mass index is 22.36 kg/m  as calculated from the following:    Height as of this encounter: 1.67 m (5' 5.75\").    Weight as of this encounter: 62.4 kg (137 lb 8 oz).  Physical Exam  GENERAL APPEARANCE: healthy, alert and no distress  EYES: Eyes grossly normal to inspection, PERRL and conjunctivae and sclerae normal  HENT: ear canals and TM's normal, nose and mouth without ulcers or lesions, oropharynx clear and oral mucous membranes moist  NECK: no adenopathy, no asymmetry, masses, or scars and thyroid normal to palpation  RESP: lungs clear to auscultation - no rales, rhonchi or wheezes  BREAST: normal without masses, tenderness or nipple discharge and no palpable axillary masses or adenopathy  CV: regular rate and rhythm, normal S1 S2, no S3 or S4, no murmur, click or rub, no peripheral edema and peripheral pulses strong  ABDOMEN: soft, nontender, no hepatosplenomegaly, no masses and bowel sounds normal   (female): normal female external genitalia, normal urethral meatus, vaginal mucosal atrophy noted, and normal post-hysterectomy exam without masses.   MS: no musculoskeletal defects are noted and gait is age appropriate without ataxia  SKIN: large 2cm oval raised keratosis   NEURO: Normal strength and tone, sensory exam grossly normal, mentation intact and speech normal  PSYCH: mentation appears normal and affect normal/bright    Diagnostic Test Results:  Labs reviewed in Epic    ASSESSMENT / PLAN:   (Z00.00) Encounter for Medicare annual wellness exam  (primary encounter diagnosis)  Comment:    Plan: " Lipid panel reflex to direct LDL Fasting,         Comprehensive metabolic panel (BMP + Alb, Alk         Phos, ALT, AST, Total. Bili, TP), TSH with free        T4 reflex               (E04.1) Thyroid nodule  Comment:    Plan: TSH with free T4 reflex             (R03.0) Elevated blood pressure reading without diagnosis of hypertension  Comment:    Plan: Comprehensive metabolic panel (BMP + Alb, Alk         Phos, ALT, AST, Total. Bili, TP), Albumin         Random Urine Quantitative with Creat Ratio        BP elevated - reviewed recommended range  Follow-up 6-8 weeks for BP recheck     Endometrial adenocarcinoma -   Vaginal cuff surveillance - normal           COUNSELING:  Reviewed preventive health counseling, as reflected in patient instructions        She reports that she has never smoked. She has never used smokeless tobacco.      Appropriate preventive services were discussed with this patient, including applicable screening as appropriate for fall prevention, nutrition, physical activity, Tobacco-use cessation, weight loss and cognition.  Checklist reviewing preventive services available has been given to the patient.    Reviewed patients plan of care and provided an AVS. The Basic Care Plan (routine screening as documented in Health Maintenance) for Leydi meets the Care Plan requirement. This Care Plan has been established and reviewed with the Patient.          Mary Leonardo, [facility]    Identified Health Risks:  I have reviewed Opioid Use Disorder and Substance Use Disorder risk factors and made any needed referrals.

## 2023-11-24 NOTE — RESULT ENCOUNTER NOTE
Dear Leydi,   Your test results are all back -   -Cholesterol levels (LDL,HDL, Triglycerides) are borderline.  ADVISE: rechecking in 1 year.   The 10-year ASCVD risk score (Tiera DK, et al., 2019) is: 12.2%  -Liver and gallbladder tests are normal (ALT,AST, Alk phos, bilirubin), kidney function is normal (Cr, GFR), sodium is normal, potassium is normal, calcium is normal, glucose is normal.  -TSH (thyroid stimulating hormone) level is normal which indicates normal thyroid function.  -Microalbumin (urine protein) test is normal.  ADVISE: rechecking this annually.  Let us know if you have any questions.  -Mary Leonardo, DO

## 2023-12-26 ENCOUNTER — PATIENT OUTREACH (OUTPATIENT)
Dept: GASTROENTEROLOGY | Facility: CLINIC | Age: 68
End: 2023-12-26
Payer: COMMERCIAL

## 2024-02-29 ENCOUNTER — OFFICE VISIT (OUTPATIENT)
Dept: FAMILY MEDICINE | Facility: CLINIC | Age: 69
End: 2024-02-29
Payer: COMMERCIAL

## 2024-02-29 ENCOUNTER — NURSE TRIAGE (OUTPATIENT)
Dept: FAMILY MEDICINE | Facility: CLINIC | Age: 69
End: 2024-02-29

## 2024-02-29 VITALS
WEIGHT: 134.2 LBS | BODY MASS INDEX: 21.57 KG/M2 | SYSTOLIC BLOOD PRESSURE: 134 MMHG | DIASTOLIC BLOOD PRESSURE: 81 MMHG | HEART RATE: 85 BPM | TEMPERATURE: 97.5 F | RESPIRATION RATE: 16 BRPM | HEIGHT: 66 IN | OXYGEN SATURATION: 97 %

## 2024-02-29 DIAGNOSIS — R25.3 TWITCHING: Primary | ICD-10-CM

## 2024-02-29 DIAGNOSIS — Z85.42 HISTORY OF UTERINE CANCER: ICD-10-CM

## 2024-02-29 DIAGNOSIS — R03.0 ELEVATED BP WITHOUT DIAGNOSIS OF HYPERTENSION: ICD-10-CM

## 2024-02-29 PROCEDURE — 99213 OFFICE O/P EST LOW 20 MIN: CPT | Performed by: FAMILY MEDICINE

## 2024-02-29 NOTE — PROGRESS NOTES
Assessment & Plan     Twitching  Plan:  sensation of pelvic twitching, more at night of unknown causes.  It possibly could be from tightly fitted clothes, mild dehydration. Reassurance given about exam- no visible fasciculation,no localized rash, no tenderness.  Ok to monitor symptoms  Keep follow up appointment with primary care physicain  Encouraged to call me earlier if more questions or concerns     History of uterine cancer  Plan: ok to proceed with pelvic exam and pelvic ultrasound if having pelvic pain- not necessary for now.  - US Pelvic Complete with Transvaginal; Future    Elevated BP without diagnosis of hypertension  Plan: I reviewed her Blood pressure readings and advised to continue to onitor it with home Blood pressure machine  She has done a great job with intentional weight loss  No need for medications       I spent a total of 23 minutes on the day of the visit.   Time spent by me doing chart review, history and exam, documentation and further activities per the note            Subjective   Leydi is a 68 year old, presenting for the following health issues:  No chief complaint on file.        2/29/2024     9:37 AM   Additional Questions   Roomed by Alanna     History of Present Illness       Reason for visit:  Abdominal issue    She eats 2-3 servings of fruits and vegetables daily.She consumes 0 sweetened beverage(s) daily.She exercises with enough effort to increase her heart rate 30 to 60 minutes per day.  She exercises with enough effort to increase her heart rate 4 days per week.   She is taking medications regularly.         Concern - Abdominal Twitches   Onset: 3 months - December 2023   Description: Muscle Twitches that have been on and off for 3 months   Daily for a few mins to sometimes lately at night for 30 mins- affecting sleep, causing concerns if due to uterine cancer. Resolves on own. Recurs on own.  States unable to correlate any cause to it . Intentional weight loss 4 lbs as  "watching diet for Blood pressure and cholesterol.    Denies that there is any pain   Denies any tingling or numbness   Intensity: mild  Progression of Symptoms:  intermittent  Accompanying Signs & Symptoms: None   Previous history of similar problem: None   Precipitating factors:        Worsened by: None   Alleviating factors:        Improved by: None   Therapies tried and outcome: None  No nausea,vomiting,diarrhea   Intentional weight loss.  Reports concern about twitching because history of uterine cancer > 10 yrs   Does not note any fasciculation on top,no rash.   Adequate bladder and bowel control.      Review of Systems  Constitutional, HEENT, cardiovascular, pulmonary, GI, , musculoskeletal, neuro, skin, endocrine and psych systems are negative, except as otherwise noted.      Objective    /81   Pulse 85   Temp 97.5  F (36.4  C) (Temporal)   Resp 16   Ht 1.67 m (5' 5.75\")   Wt 60.9 kg (134 lb 3.2 oz)   LMP 09/15/2007   SpO2 97%   Breastfeeding No   BMI 21.83 kg/m    Body mass index is 21.83 kg/m .  Physical Exam   GENERAL: alert and no distress  NECK: no adenopathy, no asymmetry, masses, or scars  RESP: lungs clear to auscultation - no rales, rhonchi or wheezes  CV: regular rate and rhythm, normal S1 S2, no S3 or S4, no murmur, click or rub, no peripheral edema  ABDOMEN: soft, nontender, no hepatosplenomegaly, no masses and bowel sounds normal  SKIN: no suspicious lesions or rashes            Signed Electronically by: Marium Eugene MD    "

## 2024-02-29 NOTE — TELEPHONE ENCOUNTER
Reason for Disposition    Patient wants to be seen    Additional Information    Negative: Muscle rigidity or tightness and present now    Negative: New-onset muscle jerks (twitches, spasms) and present now    Negative: New-onset muscle jerks and episode lasts > 1 minute and resolved    Negative: Patient sounds very sick or weak to the triager    Negative: Sounds like a seizure (generalized or focal)    Negative: Suspected alcohol withdrawal    Negative: Suspected substance use (drug use), addiction, or withdrawal    Negative: Shivering or chills and fever    Negative: Face, arm, or leg weakness    Negative: Difficult to awaken or acting confused (e.g., disoriented, slurred speech)    Negative: Sounds like a life-threatening emergency to the triager    Negative: Muscle rigidity or tightness and brief (now gone)    Negative: New-onset muscle jerks and unexplained and 3 or more times/day    Protocols used: Muscle Jerks - Tics - [id number]

## 2024-02-29 NOTE — TELEPHONE ENCOUNTER
Reason for Call:  Appointment Request    Patient requesting this type of appt:  abdominal issues - nurse care from insurance reccomends being seen in 2 weeks - leaving for vacation in one week     Requested provider: Mary Leonardo    Reason patient unable to be scheduled: Not within requested timeframe    When does patient want to be seen/preferred time: 3-7 days    Comments: Patient prefers to see Dr. Leonardo but would see another provider- would like to be seen today if possible and could be there within 20 minutes     Could we send this information to you in [employer] or would you prefer to receive a phone call?:   Patient would prefer a phone call   Okay to leave a detailed message?: Yes at Cell number on file:    Telephone Information:   Mobile [phone]       Call taken on 2/29/2024 at 8:24 AM by Sharron Baires

## 2024-02-29 NOTE — TELEPHONE ENCOUNTER
Spoke to patient  Intermittent abdominal feeling of muscle twitching  Typically at night  Occurring since December  Denies pain and all other symptoms  Scheduled same day appointment  Karen FLANAGAN RN

## 2024-04-09 ENCOUNTER — OFFICE VISIT (OUTPATIENT)
Dept: FAMILY MEDICINE | Facility: CLINIC | Age: 69
End: 2024-04-09
Payer: COMMERCIAL

## 2024-04-09 VITALS
HEART RATE: 58 BPM | RESPIRATION RATE: 16 BRPM | OXYGEN SATURATION: 99 % | BODY MASS INDEX: 21.49 KG/M2 | DIASTOLIC BLOOD PRESSURE: 80 MMHG | SYSTOLIC BLOOD PRESSURE: 142 MMHG | WEIGHT: 133.7 LBS | HEIGHT: 66 IN | TEMPERATURE: 97.3 F

## 2024-04-09 DIAGNOSIS — I10 BENIGN ESSENTIAL HYPERTENSION: Primary | ICD-10-CM

## 2024-04-09 LAB
ANION GAP SERPL CALCULATED.3IONS-SCNC: 8 MMOL/L (ref 7–15)
BUN SERPL-MCNC: 21 MG/DL (ref 8–23)
CALCIUM SERPL-MCNC: 9.6 MG/DL (ref 8.8–10.2)
CHLORIDE SERPL-SCNC: 106 MMOL/L (ref 98–107)
CREAT SERPL-MCNC: 0.78 MG/DL (ref 0.51–0.95)
DEPRECATED HCO3 PLAS-SCNC: 28 MMOL/L (ref 22–29)
EGFRCR SERPLBLD CKD-EPI 2021: 81 ML/MIN/1.73M2
ERYTHROCYTE [DISTWIDTH] IN BLOOD BY AUTOMATED COUNT: 12.5 % (ref 10–15)
GLUCOSE SERPL-MCNC: 98 MG/DL (ref 70–99)
HCT VFR BLD AUTO: 43.4 % (ref 35–47)
HGB BLD-MCNC: 13.5 G/DL (ref 11.7–15.7)
MCH RBC QN AUTO: 28.6 PG (ref 26.5–33)
MCHC RBC AUTO-ENTMCNC: 31.1 G/DL (ref 31.5–36.5)
MCV RBC AUTO: 92 FL (ref 78–100)
PLATELET # BLD AUTO: 263 10E3/UL (ref 150–450)
POTASSIUM SERPL-SCNC: 5.5 MMOL/L (ref 3.4–5.3)
RBC # BLD AUTO: 4.72 10E6/UL (ref 3.8–5.2)
SODIUM SERPL-SCNC: 142 MMOL/L (ref 135–145)
WBC # BLD AUTO: 4.7 10E3/UL (ref 4–11)

## 2024-04-09 PROCEDURE — 36415 COLL VENOUS BLD VENIPUNCTURE: CPT | Performed by: FAMILY MEDICINE

## 2024-04-09 PROCEDURE — 85027 COMPLETE CBC AUTOMATED: CPT | Performed by: FAMILY MEDICINE

## 2024-04-09 PROCEDURE — 80048 BASIC METABOLIC PNL TOTAL CA: CPT | Performed by: FAMILY MEDICINE

## 2024-04-09 PROCEDURE — 99214 OFFICE O/P EST MOD 30 MIN: CPT | Performed by: FAMILY MEDICINE

## 2024-04-09 RX ORDER — LISINOPRIL 5 MG/1
5 TABLET ORAL DAILY
Qty: 30 TABLET | Refills: 1 | Status: SHIPPED | OUTPATIENT
Start: 2024-04-09 | End: 2024-07-22

## 2024-04-09 ASSESSMENT — ANXIETY QUESTIONNAIRES
GAD7 TOTAL SCORE: 1
3. WORRYING TOO MUCH ABOUT DIFFERENT THINGS: SEVERAL DAYS
7. FEELING AFRAID AS IF SOMETHING AWFUL MIGHT HAPPEN: NOT AT ALL
2. NOT BEING ABLE TO STOP OR CONTROL WORRYING: NOT AT ALL
GAD7 TOTAL SCORE: 1
7. FEELING AFRAID AS IF SOMETHING AWFUL MIGHT HAPPEN: NOT AT ALL
6. BECOMING EASILY ANNOYED OR IRRITABLE: NOT AT ALL
4. TROUBLE RELAXING: NOT AT ALL
8. IF YOU CHECKED OFF ANY PROBLEMS, HOW DIFFICULT HAVE THESE MADE IT FOR YOU TO DO YOUR WORK, TAKE CARE OF THINGS AT HOME, OR GET ALONG WITH OTHER PEOPLE?: NOT DIFFICULT AT ALL
IF YOU CHECKED OFF ANY PROBLEMS ON THIS QUESTIONNAIRE, HOW DIFFICULT HAVE THESE PROBLEMS MADE IT FOR YOU TO DO YOUR WORK, TAKE CARE OF THINGS AT HOME, OR GET ALONG WITH OTHER PEOPLE: NOT DIFFICULT AT ALL
5. BEING SO RESTLESS THAT IT IS HARD TO SIT STILL: NOT AT ALL
1. FEELING NERVOUS, ANXIOUS, OR ON EDGE: NOT AT ALL
GAD7 TOTAL SCORE: 1

## 2024-04-09 ASSESSMENT — PAIN SCALES - GENERAL: PAINLEVEL: NO PAIN (0)

## 2024-04-09 ASSESSMENT — PATIENT HEALTH QUESTIONNAIRE - PHQ9
10. IF YOU CHECKED OFF ANY PROBLEMS, HOW DIFFICULT HAVE THESE PROBLEMS MADE IT FOR YOU TO DO YOUR WORK, TAKE CARE OF THINGS AT HOME, OR GET ALONG WITH OTHER PEOPLE: NOT DIFFICULT AT ALL
SUM OF ALL RESPONSES TO PHQ QUESTIONS 1-9: 0
SUM OF ALL RESPONSES TO PHQ QUESTIONS 1-9: 0

## 2024-04-09 NOTE — PROGRESS NOTES
Assessment & Plan   Benign essential hypertension  Patient has been having elevated blood pressures at home for the past four months. Patient's blood pressure was elevated today in clinic and upon recheck. Discussed with patient that she would likely benefit from blood pressure medication and patient was in agreement. Discussed different blood pressure lowering agents, such as ACE/ARBs, diuretics and Ca2+ channel blockers. Reviewed side effects of each medication. Patient would like to begin lisinopril. Will order labs today to check electrolytes before starting the medication.     - lisinopril (ZESTRIL) 5 MG tablet; Take 1 tablet (5 mg) by mouth daily  - CBC with platelets  - Basic metabolic panel  (Ca, Cl, CO2, Creat, Gluc, K, Na, BUN)        MEDICATIONS:        - Start taking Lisinopril 5mg daily      Sonia Holley is a 68 year old, presenting for the following health issues:  Hypertension        4/9/2024     7:15 AM   Additional Questions   Roomed by Alanna     History of Present Illness       Hypertension: She presents for follow up of hypertension.  She does check blood pressure  regularly outside of the clinic. Outside blood pressures have been over 140/90. She follows a low salt diet.     She eats 4 or more servings of fruits and vegetables daily.She consumes 0 sweetened beverage(s) daily.She exercises with enough effort to increase her heart rate 30 to 60 minutes per day.  She exercises with enough effort to increase her heart rate 4 days per week.     Leydi returns to clinic to discuss her blood pressure. She has been monitoring her pressures at home daily. Readings ranged from 125-160/75-90. She has been following a low salt diet and has eliminated the majority of sugar from her diet in attempt to lower her blood pressure. She walks or plays tennis 4 times per week. Denies headache, blurry vision or chest pain. Would be interested in discussing medication management of her blood pressure today.  "    Review of Systems  Constitutional, cardiovascular, pulmonary are negative, except as otherwise noted.      Objective    BP (!) 142/80   Pulse 58   Temp 97.3  F (36.3  C) (Temporal)   Resp 16   Ht 1.666 m (5' 5.59\")   Wt 60.6 kg (133 lb 11.2 oz)   LMP 09/15/2007   SpO2 99%   Breastfeeding No   BMI 21.85 kg/m    Body mass index is 21.85 kg/m .    Physical Exam   GENERAL: alert and no distress  RESP: lungs clear to auscultation - no rales, rhonchi or wheezes  CV: regular rate and rhythm, normal S1 S2, no S3 or S4, no murmur, click or rub, no peripheral edema  MS: no gross musculoskeletal defects noted, no edema    Labs obtained in 11/2023 reviewed today.       Jennifer Vera, MS3     Signed Electronically by: Mary Leonardo,     "

## 2024-04-10 ENCOUNTER — MYC MEDICAL ADVICE (OUTPATIENT)
Dept: FAMILY MEDICINE | Facility: CLINIC | Age: 69
End: 2024-04-10
Payer: COMMERCIAL

## 2024-04-10 DIAGNOSIS — I10 HYPERTENSION, UNSPECIFIED TYPE: Primary | ICD-10-CM

## 2024-04-10 DIAGNOSIS — E87.5 SERUM POTASSIUM ELEVATED: ICD-10-CM

## 2024-04-16 ENCOUNTER — LAB (OUTPATIENT)
Dept: LAB | Facility: CLINIC | Age: 69
End: 2024-04-16
Payer: COMMERCIAL

## 2024-04-16 ENCOUNTER — TELEPHONE (OUTPATIENT)
Dept: FAMILY MEDICINE | Facility: CLINIC | Age: 69
End: 2024-04-16

## 2024-04-16 DIAGNOSIS — I10 HYPERTENSION, UNSPECIFIED TYPE: ICD-10-CM

## 2024-04-16 DIAGNOSIS — E87.5 SERUM POTASSIUM ELEVATED: ICD-10-CM

## 2024-04-16 PROCEDURE — 36415 COLL VENOUS BLD VENIPUNCTURE: CPT

## 2024-04-16 PROCEDURE — 84132 ASSAY OF SERUM POTASSIUM: CPT

## 2024-04-16 PROCEDURE — 82088 ASSAY OF ALDOSTERONE: CPT

## 2024-04-16 PROCEDURE — 99000 SPECIMEN HANDLING OFFICE-LAB: CPT

## 2024-04-16 PROCEDURE — 84244 ASSAY OF RENIN: CPT | Mod: 90

## 2024-04-17 LAB — POTASSIUM SERPL-SCNC: 4.8 MMOL/L (ref 3.4–5.3)

## 2024-04-19 ENCOUNTER — TELEPHONE (OUTPATIENT)
Dept: FAMILY MEDICINE | Facility: CLINIC | Age: 69
End: 2024-04-19
Payer: COMMERCIAL

## 2024-04-19 DIAGNOSIS — I10 BENIGN ESSENTIAL HYPERTENSION: Primary | ICD-10-CM

## 2024-04-19 LAB
ALDOST SERPL-MCNC: 13.1 NG/DL (ref 0–31)
ALDOST/RENIN PLAS-RTO: 32.8 {RATIO} (ref 0–25)
RENIN PLAS-CCNC: 0.4 NG/ML/HR

## 2024-04-19 NOTE — TELEPHONE ENCOUNTER
PN,    Patient calling,    Patient would like to know what lab results mean.    Thanks,  Lawrence MAZARIEGOS RN

## 2024-04-19 NOTE — TELEPHONE ENCOUNTER
Please let her know I was look for underlying cause of her hypertension -     If the aldosterone renin ratio is HIGH and the aldosterone is high then you could have hyperaldosteronism causing the HTN however her aldosterone concentration was normal and your potassium was normal/borderline high (not low).  She does not meet criteria at this time but I would like to check one more time.    Please have her schedule a LAB ONLY visit in the next 1-2 weeks at 8AM - should be morning draw and I will confirm.    She should hold off from taking the blood pressure medication until she gets the blood drawn.    Thanks  PN

## 2024-04-24 ENCOUNTER — MYC MEDICAL ADVICE (OUTPATIENT)
Dept: FAMILY MEDICINE | Facility: CLINIC | Age: 69
End: 2024-04-24
Payer: COMMERCIAL

## 2024-05-07 ENCOUNTER — LAB (OUTPATIENT)
Dept: LAB | Facility: CLINIC | Age: 69
End: 2024-05-07
Payer: COMMERCIAL

## 2024-05-07 DIAGNOSIS — I10 BENIGN ESSENTIAL HYPERTENSION: ICD-10-CM

## 2024-05-07 PROCEDURE — 36415 COLL VENOUS BLD VENIPUNCTURE: CPT

## 2024-05-07 PROCEDURE — 82088 ASSAY OF ALDOSTERONE: CPT

## 2024-05-07 PROCEDURE — 84244 ASSAY OF RENIN: CPT | Mod: 90

## 2024-05-07 PROCEDURE — 99000 SPECIMEN HANDLING OFFICE-LAB: CPT

## 2024-05-09 LAB — RENIN PLAS-CCNC: 0.2 NG/ML/HR

## 2024-05-10 LAB
ALDOST SERPL-MCNC: 15.4 NG/DL (ref 0–31)
ALDOST/RENIN PLAS-RTO: 77 {RATIO} (ref 0–25)

## 2024-05-14 ENCOUNTER — MYC MEDICAL ADVICE (OUTPATIENT)
Dept: FAMILY MEDICINE | Facility: CLINIC | Age: 69
End: 2024-05-14
Payer: COMMERCIAL

## 2024-05-14 DIAGNOSIS — R79.89 ABNORMAL ALDOSTERONE TO RENIN RATIO: ICD-10-CM

## 2024-05-14 DIAGNOSIS — R09.89 LABILE BLOOD PRESSURE: Primary | ICD-10-CM

## 2024-05-15 NOTE — TELEPHONE ENCOUNTER
Called to discuss -   Ratio is off more at this time   Had 8am lab draw and has not been taking any medications  Unclear if primary aldosteronism but needs further evaluation   Will refer to endocrine  PN

## 2024-05-22 ENCOUNTER — TELEPHONE (OUTPATIENT)
Dept: ENDOCRINOLOGY | Facility: CLINIC | Age: 69
End: 2024-05-22
Payer: COMMERCIAL

## 2024-05-22 NOTE — TELEPHONE ENCOUNTER
Patient confirmed scheduled appointment:  Date: 8/21   Time: 3pm,   Visit type: new endocrine   Provider: Sarmad   Location: [facility]   Testing/imaging:   Additional notes:   Angy Young MD  [facility]; Angy Young MD  To schedulers : please offer to move forward on schedule with either  Eliza Ronquillo Salim, Herrera, Zekarias, using NON-CALL week open new/ROSALBA (60 minutes) or 2 back to back 30 minute ROSALBA spaces.         Helen Turcios on 5/22/2024 at 12:08 PM

## 2024-06-05 ENCOUNTER — MYC MEDICAL ADVICE (OUTPATIENT)
Dept: FAMILY MEDICINE | Facility: CLINIC | Age: 69
End: 2024-06-05
Payer: COMMERCIAL

## 2024-06-10 NOTE — TELEPHONE ENCOUNTER
PN,      Please see below MyChart message and advise.  Ultrasound order still in place, ok to do?    Thanks,  Lawrence MAZARIEGOS RN

## 2024-06-11 NOTE — TELEPHONE ENCOUNTER
I would recommend the pelvic ultrasound that was previously ordered   The order should be good for 6 months.  Thanks  PN

## 2024-06-12 ENCOUNTER — HOSPITAL ENCOUNTER (OUTPATIENT)
Dept: ULTRASOUND IMAGING | Facility: CLINIC | Age: 69
Discharge: HOME | End: 2024-06-12
Attending: FAMILY MEDICINE | Admitting: FAMILY MEDICINE
Payer: COMMERCIAL

## 2024-06-12 DIAGNOSIS — Z85.42 HISTORY OF UTERINE CANCER: ICD-10-CM

## 2024-06-12 PROCEDURE — 76856 US EXAM PELVIC COMPLETE: CPT

## 2024-06-12 PROCEDURE — 76830 TRANSVAGINAL US NON-OB: CPT

## 2024-07-02 ENCOUNTER — OFFICE VISIT (OUTPATIENT)
Dept: FAMILY MEDICINE | Facility: CLINIC | Age: 69
End: 2024-07-02
Payer: COMMERCIAL

## 2024-07-02 DIAGNOSIS — Z85.828 HISTORY OF BASAL CELL CARCINOMA: Primary | ICD-10-CM

## 2024-07-02 DIAGNOSIS — D23.9 DYSPLASTIC NEVUS: ICD-10-CM

## 2024-07-02 DIAGNOSIS — D22.9 MULTIPLE BENIGN NEVI: ICD-10-CM

## 2024-07-02 DIAGNOSIS — D18.01 CHERRY ANGIOMA: ICD-10-CM

## 2024-07-02 DIAGNOSIS — Z80.8 FAMILY HX OF MELANOMA: ICD-10-CM

## 2024-07-02 DIAGNOSIS — L82.1 SK (SEBORRHEIC KERATOSIS): ICD-10-CM

## 2024-07-02 DIAGNOSIS — L57.0 AK (ACTINIC KERATOSIS): ICD-10-CM

## 2024-07-02 DIAGNOSIS — L81.4 LENTIGINES: ICD-10-CM

## 2024-07-02 PROCEDURE — 99213 OFFICE O/P EST LOW 20 MIN: CPT | Mod: 25 | Performed by: DERMATOLOGY

## 2024-07-02 PROCEDURE — 17003 DESTRUCT PREMALG LES 2-14: CPT | Performed by: DERMATOLOGY

## 2024-07-02 PROCEDURE — 17000 DESTRUCT PREMALG LESION: CPT | Performed by: DERMATOLOGY

## 2024-07-02 RX ORDER — CALCIUM CARBONATE/VITAMIN D3 600 MG-10
TABLET ORAL
COMMUNITY

## 2024-07-02 NOTE — PATIENT INSTRUCTIONS
Cryotherapy    What is it?  Use of a very cold liquid, such as liquid nitrogen, to freeze and destroy abnormal skin cells that need to be removed    What should I expect?  Tenderness and redness  A small blister that might grow and fill with dark purple blood. There may be crusting.  More than one treatment may be needed if the lesions do not go away.    How do I care for the treated area?  Gently wash the area with your hands when bathing.  Use a thin layer of Vaseline to help with healing. You may use a Band-Aid.   The area should heal within 7-10 days and may leave behind a pink or lighter color.   Do not use an antibiotic or Neosporin ointment.   You may take acetaminophen (Tylenol) for pain.     Call your Doctor if you have:  Severe pain  Signs of infection (warmth, redness, cloudy yellow drainage, and or a bad smell)  Questions or concerns    Who should I call with questions?      [facility]: [phone]      [facility]: [phone]      For urgent needs outside of business hours call the [facility] at [phone]        and ask for the dermatology resident on call      Patient Education       Proper skin care from [facility]:    -Eliminate harsh soaps as they strip the natural oils from the skin, often resulting in dry itchy skin ( i.e. Dial, Zest, Liber[place] Spring)  -Use mild soaps such as Cetaphil or Dove Sensitive Skin in the shower. You do not need to use soap on arms, legs, and trunk every time you shower unless visibly soiled.   -Avoid hot or cold showers.  -After showering, lightly dry off and apply moisturizing within 2-3 minutes. This will help trap moisture in the skin.   -Aggressive use of a moisturizer at least 1-2 times a day to the entire body (including -Vanicream, Cetaphil, Aquaphor or Cerave) and moisturize hands after every washing.  -We recommend using moisturizers that come in a tub that needs to be scooped  out, not a pump. This has more of an oil base. It will hold moisture in your skin much better than a water base moisturizer. The above recommended are non-pore clogging.      Wear a sunscreen with at least SPF 30 on your face, ears, neck and V of the chest daily. Wear sunscreen on other areas of the body if those areas are exposed to the sun throughout the day. Sunscreens can contain physical and/or chemical blockers. Physical blockers are less likely to clog pores, these include zinc oxide and titanium dioxide. Reapply every two hour and after swimming.     Sunscreen examples: https://www.ewg.org/sunscreen/    UV radiation  UVA radiation remains constant throughout the day and throughout the year. It is a longer wavelength than UVB and therefore penetrates deeper into the skin leading to immediate and delayed tanning, photoaging, and skin cancer. 70-80% of UVA and UVB radiation occurs between the hours of 10am-2pm.  UVB radiation  UVB radiation causes the most harmful effects and is more significant during the summer months. However, snow and ice can reflect UVB radiation leading to skin damage during the winter months as well. UVB radiation is responsible for tanning, burning, inflammation, delayed erythema (pinkness), pigmentation (brown spots), and skin cancer.     I recommend self monthly full body exams and yearly full body exams with a dermatology provider. If you develop a new or changing lesion please follow up for examination. Most skin cancers are pink and scaly or pink and pearly. However, we do see blue/brown/black skin cancers.  Consider the ABCDEs of melanoma when giving yourself your monthly full body exam ( don't forget the groin, buttocks, feet, toes, etc). A-asymmetry, B-borders, C-color, D-diameter, E-elevation or evolving. If you see any of these changes please follow up in clinic. If you cannot see your back I recommend purchasing a hand held mirror to use with a larger wall mirror.        Checking for Skin Cancer  You can find cancer early by checking your skin each month. There are 3 kinds of skin cancer. They are melanoma, basal cell carcinoma, and squamous cell carcinoma. Doing monthly skin checks is the best way to find new marks or skin changes. Follow the instructions below for checking your skin.   The ABCDEs of checking moles for melanoma   Check your moles or growths for signs of melanoma using ABCDE:   Asymmetry: the sides of the mole or growth don t match  Border: the edges are ragged, notched, or blurred  Color: the color within the mole or growth varies  Diameter: the mole or growth is larger than 6 mm (size of a pencil eraser)  Evolving: the size, shape, or color of the mole or growth is changing (evolving is not shown in the images below)    Checking for other types of skin cancer  Basal cell carcinoma or squamous cell carcinoma have symptoms such as:     A spot or mole that looks different from all other marks on your skin  Changes in how an area feels, such as itching, tenderness, or pain  Changes in the skin's surface, such as oozing, bleeding, or scaliness  A sore that does not heal  New swelling or redness beyond the border of a mole    Who s at risk?  Anyone can get skin cancer. But you are at greater risk if you have:   Fair skin, light-colored hair, or light-colored eyes  Many moles or abnormal moles on your skin  A history of sunburns from sunlight or tanning beds  A family history of skin cancer  A history of exposure to radiation or chemicals  A weakened immune system  If you have had skin cancer in the past, you are at risk for recurring skin cancer.   How to check your skin  Do your monthly skin checkups in front of a full-length mirror. Check all parts of your body, including your:   Head (ears, face, neck, and scalp)  Torso (front, back, and sides)  Arms (tops, undersides, upper, and lower armpits)  Hands (palms, backs, and fingers, including under the nails)  Buttocks  and genitals  Legs (front, back, and sides)  Feet (tops, soles, toes, including under the nails, and between toes)  If you have a lot of moles, take digital photos of them each month. Make sure to take photos both up close and from a distance. These can help you see if any moles change over time.   Most skin changes are not cancer. But if you see any changes in your skin, call your doctor right away. Only he or she can diagnose a problem. If you have skin cancer, seeing your doctor can be the first step toward getting the treatment that could save your life.   [employer] last reviewed this educational content on 4/1/2019 2000-2020 [employer]. [address] [place] [address], [place] [address]. All rights reserved. This information is not intended as a substitute for professional medical care. Always follow your healthcare professional's instructions.       When should I call my doctor?  If you are worsening or not improving, please, contact us or seek urgent care as noted below.     Who should I call with questions (adults)?  [facility] (adult and pediatric): [phone]  [facility] (adult): [phone]  [facility] ([facility] [place]) [phone]  For urgent needs outside of business hours call the [facility] at [phone] and ask for the dermatology resident on call to be paged  If this is a medical emergency and you are unable to reach an ER, Call [phone]      If you need a prescription refill, please contact your pharmacy. Refills are approved or denied by our Physicians during normal business hours, Monday through Fridays  Per office policy, refills will not be granted if you have not been seen within the past year (or sooner depending on your child's condition)

## 2024-07-02 NOTE — LETTER
7/2/2024      Leydi Zaragoza  [address]  [facility] [address]      Dear Colleague,    Thank you for referring your patient, Leydi Zaragoza, to the [facility]. Please see a copy of my visit note below.    [facility] Dermatology Note  Encounter Date: Jul 2, 2024  Office Visit     Dermatology Problem List:  FBSE: 7/2/24    # BCC, L chest excised 3/11/21  - History of DN, >10 years ago, right arm without pigment recurrence  # Family history of melanoma, daughter (40s), did not require SLNB  # Benign bx  - Benign lichenoid keratosis, L upper back, s/p bx 4/13/22    #LTM  -  L dorsal forearm, Favor BLK  - L plantar mid foot   - Photo 7/2/24  ____________________________________________     Assessment & Plan:     # AK, L dorsal forearm x 2   - cryotherapy performed today, see procedure note below  - Monitor for resolution.     - LTM, R? dorsal forearm & L plantar mid foot  - R dorsal forearm there is pink patch with scalloped edges favor BLK.   - L plantar mid food there is a 1.1 x 9 mm pink patch; dermoscopy: No pigment network, regular vessels; favor vascular  - Photographed today     # Multiple benign nevi.   # fhx melanoma.  - Monitor for ABCDEs of melanoma   - Continue sun protection - recommend SPF 30 or higher with frequent application   - Return sooner if noticing changing or symptomatic lesions    # Benign lesions - SKs, cherry angiomas, lentigenes.  - No treatment required     # History of NMSC. No evidence of recurrent disease.  - Continue photoprotection - recommend SPF 30 or higher with frequent reapplication  - Continue yearly skin exams  - Advised to monitor for changing, non-healing, bleeding, painful, changing, or otherwise symptomatic lesions      Procedures Performed:   CRYOTHERAPY PROCEDURE NOTE: 2 lesions in the above locations were treated with liquid nitrogen utilizing a 5-10sec thaw time. Patient was advised that the treated areas will  become red, swollen, may develop a blister and then should crust and peal off in the next 1-2 weeks. Post-procedure instructions were provided.      Follow-up: 3-4 months (s) in-person, or earlier for new or changing lesions    Staff and Scribe:     Scribe Disclosure:   I, JANKI KELLEY, am serving as a scribe; to document services personally performed by Blossom Crowley MD -based on data collection and the provider's statements to me.     Provider Disclosure:   The documentation recorded by the scribe accurately reflects the services I personally performed and the decisions made by me.    Blossom Crowley MD    Department of Dermatology  [facility]: Phone: [phone], Fax: [phone]  7/7/2024      ____________________________________________    CC: Skin Check (Annual FBSC/Concerns on left arm and right arm /Groin keratosis )    HPI:  Ms. Leydi Zaragoza is a(n) 69 year old female who presents today as a return patient for annual FBSC.     Today patient reported a spot of concern on her L and R arms.     Patient has a hx of NMSC, BCC on her chest.     She also has a hx of DN 10+ years ago.     Patient is otherwise feeling well, without additional skin concerns.     Labs Reviewed:  N/A    Physical Exam:  Vitals: LMP 09/15/2007   SKIN: Total skin excluding the undergarment areas was performed. The exam included the head/face, neck, both arms, chest, back, abdomen, both legs, digits and/or nails.   - - There are dome shaped bright red papules on the trunk and extremities .   - Multiple regular brown pigmented macules and papules are identified on the trunk and extremities. .   - Scattered brown macules on sun exposed areas.  - Waxy stuck on papules and plaques on trunk and extremities.    - L dorsal forearm there is a pink scaly papule. Medial to this there in a pink papule with hemorrhage    - R dorsal forearm  there is pink patch with scalloped edges favor BLK.   - L plantar mid food there is a 1.1 x 9 mm pink patch; dermoscopy: No pigment network, regular vessels  - No other lesions of concern on areas examined.     Medications:  Current Outpatient Medications   Medication Sig Dispense Refill     calcium carbonate-vitamin D (CALTRATE) 600-10 MG-MCG per tablet        lisinopril (ZESTRIL) 5 MG tablet Take 1 tablet (5 mg) by mouth daily 30 tablet 1     vitamin B complex with vitamin C (VITAMIN  B COMPLEX) tablet        UNABLE TO FIND Take 3 tablets by mouth daily MEDICATION NAME: [employer] Bone Builder       No current facility-administered medications for this visit.      Past Medical History:   Patient Active Problem List   Diagnosis     Mucous polyp of cervix     CARDIOVASCULAR SCREENING; LDL GOAL LESS THAN 160     Adenomatous colon polyp     Dysplastic nevus     Thyroid nodule     Pulmonary nodule     Chronic pansinusitis     Elevated BP without diagnosis of hypertension     History of uterine cancer     Twitching     Past Medical History:   Diagnosis Date     Acute bronchospasm     one year but resolved - exercise induced.     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       Again, thank you for allowing me to participate in the care of your patient.        Sincerely,        Blossom Crowley MD

## 2024-07-02 NOTE — PROGRESS NOTES
[facility] Dermatology Note  Encounter Date: Jul 2, 2024  Office Visit     Dermatology Problem List:  FBSE: 7/2/24    # BCC, L chest excised 3/11/21  - History of DN, >10 years ago, right arm without pigment recurrence  # Family history of melanoma, daughter (40s), did not require SLNB  # Benign bx  - Benign lichenoid keratosis, L upper back, s/p bx 4/13/22    #LTM  -  L dorsal forearm, Favor BLK  - L plantar mid foot   - Photo 7/2/24  ____________________________________________     Assessment & Plan:     # AK, L dorsal forearm x 2   - cryotherapy performed today, see procedure note below  - Monitor for resolution.     - LTM, R? dorsal forearm & L plantar mid foot  - R dorsal forearm there is pink patch with scalloped edges favor BLK.   - L plantar mid food there is a 1.1 x 9 mm pink patch; dermoscopy: No pigment network, regular vessels; favor vascular  - Photographed today     # Multiple benign nevi.   # fhx melanoma.  - Monitor for ABCDEs of melanoma   - Continue sun protection - recommend SPF 30 or higher with frequent application   - Return sooner if noticing changing or symptomatic lesions    # Benign lesions - SKs, cherry angiomas, lentigenes.  - No treatment required     # History of NMSC. No evidence of recurrent disease.  - Continue photoprotection - recommend SPF 30 or higher with frequent reapplication  - Continue yearly skin exams  - Advised to monitor for changing, non-healing, bleeding, painful, changing, or otherwise symptomatic lesions      Procedures Performed:   CRYOTHERAPY PROCEDURE NOTE: 2 lesions in the above locations were treated with liquid nitrogen utilizing a 5-10sec thaw time. Patient was advised that the treated areas will become red, swollen, may develop a blister and then should crust and peal off in the next 1-2 weeks. Post-procedure instructions were provided.      Follow-up: 3-4 months (s) in-person, or earlier for new or changing lesions    Staff and Scribe:      Scribe Disclosure:   I, JANKI KELLEY, am serving as a scribe; to document services personally performed by Blossom Crowley MD -based on data collection and the provider's statements to me.     Provider Disclosure:   The documentation recorded by the scribe accurately reflects the services I personally performed and the decisions made by me.    Blossom Crowley MD    Department of Dermatology  [facility]: Phone: [phone], Fax: [phone]  7/7/2024      ____________________________________________    CC: Skin Check (Annual FBSC/Concerns on left arm and right arm /Groin keratosis )    HPI:  Ms. Leydi Zaragoza is a(n) 69 year old female who presents today as a return patient for annual FBSC.     Today patient reported a spot of concern on her L and R arms.     Patient has a hx of NMSC, BCC on her chest.     She also has a hx of DN 10+ years ago.     Patient is otherwise feeling well, without additional skin concerns.     Labs Reviewed:  N/A    Physical Exam:  Vitals: LMP 09/15/2007   SKIN: Total skin excluding the undergarment areas was performed. The exam included the head/face, neck, both arms, chest, back, abdomen, both legs, digits and/or nails.   - - There are dome shaped bright red papules on the trunk and extremities .   - Multiple regular brown pigmented macules and papules are identified on the trunk and extremities. .   - Scattered brown macules on sun exposed areas.  - Waxy stuck on papules and plaques on trunk and extremities.    - L dorsal forearm there is a pink scaly papule. Medial to this there in a pink papule with hemorrhage    - R dorsal forearm there is pink patch with scalloped edges favor BLK.   - L plantar mid food there is a 1.1 x 9 mm pink patch; dermoscopy: No pigment network, regular vessels  - No other lesions of concern on areas examined.     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      Past Medical History:   Patient Active Problem List   Diagnosis    Mucous polyp of cervix    CARDIOVASCULAR SCREENING; LDL GOAL LESS THAN 160    Adenomatous colon polyp    Dysplastic nevus    Thyroid nodule    Pulmonary nodule    Chronic pansinusitis    Elevated BP without diagnosis of hypertension    History of uterine cancer    Twitching     Past Medical History:   Diagnosis Date    Acute bronchospasm     one year but resolved - exercise induced.    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

## 2024-07-21 DIAGNOSIS — I10 BENIGN ESSENTIAL HYPERTENSION: ICD-10-CM

## 2024-07-22 RX ORDER — LISINOPRIL 5 MG/1
5 TABLET ORAL DAILY
Qty: 30 TABLET | Refills: 1 | Status: SHIPPED | OUTPATIENT
Start: 2024-07-22 | End: 2024-09-20

## 2024-07-23 NOTE — CONFIDENTIAL NOTE
RECORDS RECEIVED FROM: internal    DATE RECEIVED: 8.21.24    NOTES (FOR ALL VISITS) STATUS DETAILS   OFFICE NOTES from referring provider internal    Mary Leonardo DO      MEDICATION LIST internal     IMAGING      DEXASCAN internal  12.14.22    LABS     DIABETES: HBGA1C, CREATININE, FASTING LIPIDS, MICROALBUMIN URINE, POTASSIUM, TSH, T4    THYROID: TSH, T4, CBC, THYRODLONULIN, TOTAL T3, FREE T4, CALCITONIN, CEA internal  Pottassium- 4.16.24  Bmp- 4.9.24  Cbc- 4.9.24  TSH/T4- 11.21.22  Cmp- 11.18.22   Lipid- 11.18.22

## 2024-07-24 ENCOUNTER — NURSE TRIAGE (OUTPATIENT)
Dept: FAMILY MEDICINE | Facility: CLINIC | Age: 69
End: 2024-07-24
Payer: COMMERCIAL

## 2024-07-24 ENCOUNTER — MYC MEDICAL ADVICE (OUTPATIENT)
Dept: FAMILY MEDICINE | Facility: CLINIC | Age: 69
End: 2024-07-24
Payer: COMMERCIAL

## 2024-07-24 NOTE — TELEPHONE ENCOUNTER
Called patient back,    Advised patient that they should be evaluated per covering provider.  Patient stated they now identified a pimple like spot on the external skin in perineal area, not an internal location.  Patient believe they may have a pimple and will monitor spot for now.  Patient will call back with additional concerns or the location does not heal.    Lawrence MAZARIEGOS RN

## 2024-07-24 NOTE — TELEPHONE ENCOUNTER
PN,    Patient requesting review    Nurse Triage SBAR    Is this a 2nd Level Triage? NO    Situation: Patient calling for minimal vaginal bleeding noticed this morning.    Background: Patient had uterine cancer back in 2013, history of hysterectomy.  Patient noticed 2-3 drops of blood this morning in their underwear after waking up.  Patient went to play tennis without issue.  Patient noticed a small spot of blood following in the shower.  No other instances of blood.  No other symptoms.  Patient had a vaginal US recently due to vaginal twitching, unremarkable.  Patient states twitching has resolved.    Assessment:   Minimal spotting 2-3 drops, nothing since  No abdominal pain  No other symptoms present    Protocol Recommended Disposition:   See in Office Within 2 Weeks    Recommendation: See in office in 2 weeks.  Patient would rather provider review to see if appointment required given recent vaginal US.  Informed patient provider is not in office for two weeks but information will be routed to them.  Patient verbalized understanding.    Routed to provider    Lawrence MAZARIEGOS RN      Reason for Disposition   Bleeding or spotting occurs after hysterectomy    Additional Information   Negative: SEVERE vaginal bleeding (e.g., continuous red blood from vagina, or large blood clots) and very weak (can't stand)   Negative: Passed out (i.e., fainted, collapsed and was not responding)   Negative: Difficult to awaken or acting confused (e.g., disoriented, slurred speech)   Negative: Shock suspected (e.g., cold/pale/clammy skin, too weak to stand, low BP, rapid pulse)   Negative: Sounds like a life-threatening emergency to the triager   Negative: Pregnant 20 or more weeks (5 months or more)   Negative: Pregnant < 20 weeks (less than 5 months)   Negative: Postpartum (from 0 to 6 weeks after delivery)   Negative: Vaginal discharge is main symptom and bleeding is slight   Negative: SEVERE abdominal pain (e.g., excruciating)   Negative:  SEVERE dizziness (e.g., unable to stand, requires support to walk, feels like passing out now)   Negative: SEVERE vaginal bleeding (e.g., soaking 2 pads or tampons per hour and present 2 or more hours; 1 menstrual cup every 2 hours)   Negative: Patient sounds very sick or weak to the triager   Negative: MODERATE vaginal bleeding (i.e., soaking pad or tampon per hour and present > 6 hours; 1 menstrual cup every 6 hours)   Negative: Constant abdominal pain lasting > 2 hours   Negative: Pale skin (pallor) of new-onset or worsening   Negative: Taking Coumadin (warfarin) or other strong blood thinner, or known bleeding disorder (e.g., thrombocytopenia)   Negative: Skin bruises or nosebleed and not caused by an injury   Negative: Patient wants to be seen   Negative: Passed tissue (e.g., gray-white)   Negative: Bleeding or spotting after procedure (e.g., biopsy) or pelvic examination (e.g., pap smear) that lasts > 7 days   Negative: Periods with > 6 soaked pads or tampons per day   Negative: Periods last > 7 days   Negative: Uses menstrual cups and more than 80 ml blood per menstrual period   Negative: Missed period has occurred 2 or more times in the last year and the cause is not known   Negative: Menstrual cycle < 21 days OR > 35 days, and occurs more than two cycles (2 months) this past year   Negative: Bleeding or spotting between regular periods occurs more than three cycles (3 months) this past year   Negative: Bleeding or spotting between regular periods occurs more than three cycles (3 months), and using birth control medicine (e.g., pills, patch, Depo-Provera, Implanon, vaginal ring, Mirena IUD)    Protocols used: Vaginal Bleeding - Ddjnhdup-P-SH

## 2024-08-21 ENCOUNTER — PRE VISIT (OUTPATIENT)
Dept: ENDOCRINOLOGY | Facility: CLINIC | Age: 69
End: 2024-08-21

## 2024-08-21 ENCOUNTER — LAB (OUTPATIENT)
Dept: LAB | Facility: CLINIC | Age: 69
End: 2024-08-21
Payer: COMMERCIAL

## 2024-08-21 ENCOUNTER — OFFICE VISIT (OUTPATIENT)
Dept: ENDOCRINOLOGY | Facility: CLINIC | Age: 69
End: 2024-08-21
Attending: FAMILY MEDICINE
Payer: COMMERCIAL

## 2024-08-21 VITALS
SYSTOLIC BLOOD PRESSURE: 143 MMHG | BODY MASS INDEX: 22.49 KG/M2 | HEIGHT: 65 IN | OXYGEN SATURATION: 98 % | DIASTOLIC BLOOD PRESSURE: 76 MMHG | HEART RATE: 75 BPM | WEIGHT: 135 LBS

## 2024-08-21 DIAGNOSIS — E26.9 HIGH ALDOSTERONE TO RENIN RATIO (H): Primary | ICD-10-CM

## 2024-08-21 DIAGNOSIS — E26.9 HIGH ALDOSTERONE TO RENIN RATIO (H): ICD-10-CM

## 2024-08-21 DIAGNOSIS — R09.89 LABILE BLOOD PRESSURE: ICD-10-CM

## 2024-08-21 DIAGNOSIS — E04.1 THYROID NODULE: ICD-10-CM

## 2024-08-21 DIAGNOSIS — R79.89 ABNORMAL ALDOSTERONE TO RENIN RATIO: ICD-10-CM

## 2024-08-21 PROCEDURE — 99204 OFFICE O/P NEW MOD 45 MIN: CPT | Performed by: INTERNAL MEDICINE

## 2024-08-21 ASSESSMENT — PAIN SCALES - GENERAL: PAINLEVEL: NO PAIN (0)

## 2024-08-21 NOTE — NURSING NOTE
"Chief Complaint   Patient presents with    Adrenal Problem     Vital signs:      BP: (!) 152/75 Pulse: 75     SpO2: 98 %     Height: 166 cm (5' 5.35\") Weight: 61.2 kg (135 lb)  Estimated body mass index is 22.22 kg/m  as calculated from the following:    Height as of this encounter: 1.66 m (5' 5.35\").    Weight as of this encounter: 61.2 kg (135 lb).        "

## 2024-08-21 NOTE — LETTER
"8/21/2024       RE: Leydi Zaragoza  [address]  [facility] [address]     Dear Colleague,    Thank you for referring your patient, Leydi Zaragoza, to the [facility] ENDOCRINOLOGY CLINIC [place] [facility]. Please see a copy of my visit note below.    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  Home blood pressure mostly controlled on the current dose of lisinopril 5 mg daily.  Aldosterone to renin ratio was checked at PCP office which showed high aldosterone to renin ratio as documented below.     Latest Ref Rng 4/16/2024  11:29 AM 5/7/2024  7:49 AM   ENDO ADRENAL LABS      Aldosterone 0.0 - 31.0 ng/dL 13.1  15.4    Renin Activity ng/mL/hr 0.4  0.2      We will proceed with aldosterone suppression test. #1 Increase salt intake for 3 days - add one flat teaspoon of salt to your daily food intake and consume salty foods for 72 hours (3 days).  #2 Collect urine collection jug from the laboratory. #3 On the third day of increased salt intake; start 24 hour urine collection until the morning of day. Brief description of 24 hour urine collection:  Do the test on a day off, so you can stay home.  Wake up and flush the first urine.  Then collect all the urine for that day, evening and overnight if you wake up to urinate. Plus the first urine of the next morning.  For women,  the lab should also give you a \"hat\" which is a device to help collect the urine and to pour it into the collection jug. The urine must be kept cool, not frozen.  So, do not put it out on the porch.  Keep it in the fridge. #4 Drop the jug back to the laboratory after collection is complete on the 4th day.     #5 Please monitor blood " pressure closely while on increased salt intake and make sure that you take potassium supplement (if prescribed).      Once primary aldosteronism is confirmed and then we will proceed with CT scan of the adrenal gland and adrenal venous sampling if patient is interested in surgical management.  I have personally reviewed CT scan from 11/5/2020 pertinent to the adrenal gland and by my reading no adrenal nodule or thickening appreciated.   Test and/or medications prescribed today:  Orders Placed This Encounter   Procedures     US Thyroid     Creatinine timed urine     Sodium timed urine     Aldosterone urine         Mandy Bañuelos MD  Staff Endocrinologist    Division of Endocrinology and Diabetes      Subjective:         HPI: Leydi Zaragoza is a 69 year old female with history of hypertension and high aldosterone to renin ratio who is seen in consultation at Mary Leonardo's request for the same.  Hypertension diagnosed over the last few years and currently managed with lisinopril 5 mg daily.  Home blood pressure mostly controlled on the current dose of lisinopril 5 mg daily.  Aldosterone to renin ratio was checked at PCP office which showed high aldosterone to renin ratio as documented below.     Latest Ref Rng 4/16/2024  11:29 AM 5/7/2024  7:49 AM   ENDO ADRENAL LABS      Aldosterone 0.0 - 31.0 ng/dL 13.1  15.4    Renin Activity ng/mL/hr 0.4  0.2    Home blood pressure readings reviewed and blood pressure mostly 120s on lisinopril 5 mg daily.  No history of cardiovascular disease.     Allergies   Allergen Reactions     Amoxicillin      rash       Current Outpatient Medications   Medication Sig Dispense Refill     calcium carbonate-vitamin D (CALTRATE) 600-10 MG-MCG per tablet        lisinopril (ZESTRIL) 5 MG tablet TAKE 1 TABLET(5 MG) BY MOUTH DAILY 30 tablet 1     psyllium (METAMUCIL/KONSYL) capsule Take 1 capsule by mouth daily.       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  Pertinent to the visit past medical history, past surgical history, family history, social history and social determinants of health reviewed.  Family history of hypertension in her father and mother.      Objective:   BP (!) 143/76   Pulse 75   Ht 1.66 m (5' 5.35\")   Wt 61.2 kg (135 lb)   LMP 09/15/2007   SpO2 98%   BMI 22.22 kg/m    Constitutional: Pleasant no acute cardiopulmonary distress.   EYES: anicteric, normal extra-ocular movements, no lid lag or retraction, is equal and reactive to light bilaterally.  HEENT: Mouth/Throat: Mucous membrane is moist. Oropharynx is clear.  Thyroid examination: Unremarkable.  Cardiovascular: RRR, S1, S2 normal.   Pulmonary/Chest: CTAB. No wheezing or rales.   Abdominal: +BS. Non tender to palpation.  Stretch marks: None.  Neurological: Alert and oriented.  No tremor and reflexes are symmetrical bilaterally and within the normal limits. Muscle strength 5/5.   Extremities: No edema.  Psychological: appropriate mood and affect    In House Labs:     TSH   Date Value Ref Range Status   11/21/2023 2.86 0.30 - 4.20 uIU/mL Final   11/18/2022 3.68 0.30 - 4.20 uIU/mL Final   11/16/2021 2.77 0.40 - 4.00 mU/L Final   10/28/2020 2.86 0.40 - 4.00 mU/L Final   10/22/2019 2.48 0.40 - 4.00 mU/L Final   05/16/2018 1.20 0.40 - 4.00 mU/L Final   09/03/2014 1.64 0.40 - 4.00 mU/L Final     Comment:     Effective 7/30/2014, the reference range for this assay has changed to reflect   new instrumentation/methodology.     07/24/2013 2.07 0.4 - 5.0 mU/L Final     T4 Free   Date Value Ref Range Status   05/16/2018 0.94 0.76 - 1.46 ng/dL Final       Creatinine   Date Value " Ref Range Status   04/09/2024 0.78 0.51 - 0.95 mg/dL Final   11/05/2020 0.64 0.52 - 1.04 mg/dL Final     This note has been dictated using voice recognition software.  As a result, there may be errors in the documentation that have gone undetected.  Please consider this when interpreting information in this documentation.          Again, thank you for allowing me to participate in the care of your patient.      Sincerely,    Mandy Bañuelos MD

## 2024-08-21 NOTE — PATIENT INSTRUCTIONS
"#1 Increase salt intake for 3 days - add one flat teaspoon of salt to your daily food intake and consume salty foods for 72 hours (3 days).  #2 Collect urine collection jug from the laboratory.   #3 On the third day of increased salt intake; start 24 hour urine collection until the morning of day. Brief description of 24 hour urine collection:  Do the test on a day off, so you can stay home.  Wake up and flush the first urine.  Then collect all the urine for that day, evening and overnight if you wake up to urinate. Plus the first urine of the next morning.    For women,  the lab should also give you a \"hat\" which is a device to help collect the urine and to pour it into the collection jug. The urine must be kept cool, not frozen.  So, do not put it out on the porch.  Keep it in the fridge.  #4 Drop the jug back to the laboratory after collection is complete on the 4th day.     #5 Please monitor blood pressure closely while on increased salt intake.   "

## 2024-08-21 NOTE — PROGRESS NOTES
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  Home blood pressure mostly controlled on the current dose of lisinopril 5 mg daily.  Aldosterone to renin ratio was checked at PCP office which showed high aldosterone to renin ratio as documented below.     Latest Ref Rng 4/16/2024  11:29 AM 5/7/2024  7:49 AM   ENDO ADRENAL LABS      Aldosterone 0.0 - 31.0 ng/dL 13.1  15.4    Renin Activity ng/mL/hr 0.4  0.2      We will proceed with aldosterone suppression test. #1 Increase salt intake for 3 days - add one flat teaspoon of salt to your daily food intake and consume salty foods for 72 hours (3 days).  #2 Collect urine collection jug from the laboratory. #3 On the third day of increased salt intake; start 24 hour urine collection until the morning of day. Brief description of 24 hour urine collection:  Do the test on a day off, so you can stay home.  Wake up and flush the first urine.  Then collect all the urine for that day, evening and overnight if you wake up to urinate. Plus the first urine of the next morning.  For women,  the lab should also give you a \"hat\" which is a device to help collect the urine and to pour it into the collection jug. The urine must be kept cool, not frozen.  So, do not put it out on the porch.  Keep it in the fridge. #4 Drop the jug back to the laboratory after collection is complete on the 4th day.     #5 Please monitor blood pressure closely while on increased salt intake and make sure that you take potassium supplement (if prescribed).      Once primary aldosteronism is confirmed and then we will proceed with CT scan of the adrenal gland and adrenal venous sampling if patient is interested in surgical management.  I have personally reviewed CT " scan from 11/5/2020 pertinent to the adrenal gland and by my reading no adrenal nodule or thickening appreciated.   Test and/or medications prescribed today:  Orders Placed This Encounter   Procedures    US Thyroid    Creatinine timed urine    Sodium timed urine    Aldosterone urine         Mandy Bañuelos MD  Staff Endocrinologist    Division of Endocrinology and Diabetes      Subjective:         HPI: Leydi Zaragoza is a 69 year old female with history of hypertension and high aldosterone to renin ratio who is seen in consultation at Mary Leonardo's request for the same.  Hypertension diagnosed over the last few years and currently managed with lisinopril 5 mg daily.  Home blood pressure mostly controlled on the current dose of lisinopril 5 mg daily.  Aldosterone to renin ratio was checked at PCP office which showed high aldosterone to renin ratio as documented below.     Latest Ref Rng 4/16/2024  11:29 AM 5/7/2024  7:49 AM   ENDO ADRENAL LABS      Aldosterone 0.0 - 31.0 ng/dL 13.1  15.4    Renin Activity ng/mL/hr 0.4  0.2    Home blood pressure readings reviewed and blood pressure mostly 120s on lisinopril 5 mg daily.  No history of cardiovascular disease.     Allergies   Allergen Reactions    Amoxicillin      rash       Current Outpatient Medications   Medication Sig Dispense Refill    calcium carbonate-vitamin D (CALTRATE) 600-10 MG-MCG per tablet       lisinopril (ZESTRIL) 5 MG tablet TAKE 1 TABLET(5 MG) BY MOUTH DAILY 30 tablet 1    psyllium (METAMUCIL/KONSYL) capsule Take 1 capsule by mouth daily.      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   "Pertinent to the visit past medical history, past surgical history, family history, social history and social determinants of health reviewed.  Family history of hypertension in her father and mother.      Objective:   BP (!) 143/76   Pulse 75   Ht 1.66 m (5' 5.35\")   Wt 61.2 kg (135 lb)   LMP 09/15/2007   SpO2 98%   BMI 22.22 kg/m    Constitutional: Pleasant no acute cardiopulmonary distress.   EYES: anicteric, normal extra-ocular movements, no lid lag or retraction, is equal and reactive to light bilaterally.  HEENT: Mouth/Throat: Mucous membrane is moist. Oropharynx is clear.  Thyroid examination: Unremarkable.  Cardiovascular: RRR, S1, S2 normal.   Pulmonary/Chest: CTAB. No wheezing or rales.   Abdominal: +BS. Non tender to palpation.  Stretch marks: None.  Neurological: Alert and oriented.  No tremor and reflexes are symmetrical bilaterally and within the normal limits. Muscle strength 5/5.   Extremities: No edema.  Psychological: appropriate mood and affect    In House Labs:     TSH   Date Value Ref Range Status   11/21/2023 2.86 0.30 - 4.20 uIU/mL Final   11/18/2022 3.68 0.30 - 4.20 uIU/mL Final   11/16/2021 2.77 0.40 - 4.00 mU/L Final   10/28/2020 2.86 0.40 - 4.00 mU/L Final   10/22/2019 2.48 0.40 - 4.00 mU/L Final   05/16/2018 1.20 0.40 - 4.00 mU/L Final   09/03/2014 1.64 0.40 - 4.00 mU/L Final     Comment:     Effective 7/30/2014, the reference range for this assay has changed to reflect   new instrumentation/methodology.     07/24/2013 2.07 0.4 - 5.0 mU/L Final     T4 Free   Date Value Ref Range Status   05/16/2018 0.94 0.76 - 1.46 ng/dL Final       Creatinine   Date Value Ref Range Status   04/09/2024 0.78 0.51 - 0.95 mg/dL Final   11/05/2020 0.64 0.52 - 1.04 mg/dL Final     This note has been dictated using voice recognition software.  As a result, there may be errors in the documentation that have gone undetected.  Please consider this when interpreting information in this documentation.        "

## 2024-09-11 ENCOUNTER — LAB (OUTPATIENT)
Dept: LAB | Facility: CLINIC | Age: 69
End: 2024-09-11
Payer: COMMERCIAL

## 2024-09-11 DIAGNOSIS — E26.9 HIGH ALDOSTERONE TO RENIN RATIO (H): Primary | ICD-10-CM

## 2024-09-11 LAB
COLLECT DURATION TIME UR: 24 H
COLLECT DURATION TIME UR: 24 H
CREAT 24H UR-MRATE: 0.87 G/SPEC (ref 0.72–1.51)
CREAT UR-MCNC: 51.1 MG/DL
SODIUM 24H UR-SRATE: 245 MMOL/SPEC (ref 40–220)
SODIUM UR-SCNC: 144 MMOL/L
SPECIMEN VOL UR: 1700 ML
SPECIMEN VOL UR: 1700 ML

## 2024-09-11 PROCEDURE — 84300 ASSAY OF URINE SODIUM: CPT | Performed by: INTERNAL MEDICINE

## 2024-09-11 PROCEDURE — 81050 URINALYSIS VOLUME MEASURE: CPT

## 2024-09-11 PROCEDURE — 82570 ASSAY OF URINE CREATININE: CPT

## 2024-09-11 PROCEDURE — 99000 SPECIMEN HANDLING OFFICE-LAB: CPT | Performed by: PATHOLOGY

## 2024-09-19 LAB
ALDOST 24H UR-MRATE: 1.5 UG/D
CREAT 24H UR-MRATE: 918 MG/D
CREAT UR-MCNC: 54 MG/DL

## 2024-09-20 DIAGNOSIS — I10 BENIGN ESSENTIAL HYPERTENSION: ICD-10-CM

## 2024-09-20 RX ORDER — LISINOPRIL 5 MG/1
5 TABLET ORAL DAILY
Qty: 90 TABLET | Refills: 1 | Status: SHIPPED | OUTPATIENT
Start: 2024-09-20

## 2024-09-20 NOTE — TELEPHONE ENCOUNTER
Triage,  Patient's insurance called.   Wondering if we can change the lisinopril 5 mg Rx to a 100 day supply because her insurance will only cover that amount.   Please advise.   Thanks!  Jailene PAGE

## 2024-09-27 ENCOUNTER — PATIENT OUTREACH (OUTPATIENT)
Dept: CARE COORDINATION | Facility: CLINIC | Age: 69
End: 2024-09-27
Payer: COMMERCIAL

## 2024-10-01 ENCOUNTER — ANCILLARY PROCEDURE (OUTPATIENT)
Dept: ULTRASOUND IMAGING | Facility: CLINIC | Age: 69
End: 2024-10-01
Attending: INTERNAL MEDICINE
Payer: COMMERCIAL

## 2024-10-01 DIAGNOSIS — E04.1 THYROID NODULE: ICD-10-CM

## 2024-10-01 PROCEDURE — 76536 US EXAM OF HEAD AND NECK: CPT

## 2024-10-07 NOTE — PROGRESS NOTES
[facility] Dermatology Note  Encounter Date: Oct 8, 2024  Office Visit     Dermatology Problem List:    # NUB, L lateral forearm, nonspecific slightly pink papule  - shave bx done 10/08/2024    # BCC, L chest excised 3/11/21  - History of DN, >10 years ago, right arm without pigment recurrence  # Family history of melanoma, daughter (40s), did not require SLNB  # Benign bx  - Benign lichenoid keratosis, L upper back, s/p bx 4/13/22    #LTM  -  L dorsal forearm, Favor BLK  - L plantar mid foot   - Photo 7/2/24  ____________________________________________     Assessment & Plan:     # NUB, L lateral forearm, nonspecific slightly pink papule  - shave bx done 10/08/2024    # Resolving SK on L back  - discussed cryo but will monitor for now    # LTM - L dorsal forearm & L plantar mid foot  - new today --> L dorsal forearm there is pink patch with scalloped edges favor BLK.   - from last visit --> L plantar mid food there is a 1.1 x 9 mm pink patch; dermoscopy: No pigment network, regular vessels; favor vascular; unchanged from prior  - from last visit --> R forearm , prior erythematous patch resolved      # History of NMSC. No evidence of recurrent disease.  - Plan for FBSC July 2025, pending pathology results  - discussed risk of NMSC on ACE inhibitor, possibly few reports in literature, most evidence is for hydrochlorothiazide     Procedures Performed:   Shave biopsy: Location(s) L lateral forearm.  After discussion of benefits and risks including but not limited to bleeding/bruising, pain/swelling, infection, scar, incomplete removal, nerve damage/numbness, recurrence, and non-diagnostic biopsy,  verbal consent and photographs were obtained. Time-out was performed. The area was cleaned with isopropyl alcohol. 0.5mL of 1% lidocaine with epinephrine was injected to obtain adequate anesthesia of each lesion. Shave biopsy was performed. Hemostasis was achieved with aluminium chloride. Vaseline and a  sterile dressing were applied. The patient tolerated the procedure and no complications were noted. The patient was provided with verbal and written post care instructions.        Follow-up: July 2025 for FBSC, or sooner pending pathology     Staff and Scribe:     Scribe Disclosure:   I, Jailene Louise, am serving as a scribe; to document services personally performed by Blossom Crowley MD -based on data collection and the provider's statements to me.     Provider Disclosure:   The documentation recorded by the scribe accurately reflects the services I personally performed and the decisions made by me.    Blossom Crowley MD    Department of Dermatology  [facility]: Phone: [phone], Fax: [phone]  10/14/2024     ____________________________________________    CC: Skin Check (Spot on back on left side/3 spots that provider wanted to watch)    HPI:  Ms. Leydi Zaragoza is a(n) 69 year old female who presents today as a return patient for annual FBSC.     The patient reports that she doesn't know if the spot on her back is new, but she would like it to be checked. She states that the spot is painful. She notes that a spot that was froze off has grown back on her arm. She denies any sensitivity to this area.      Patient is otherwise feeling well, without additional skin concerns.     Labs Reviewed:  N/A    Physical exam:  Vitals: LMP 09/15/2007   GEN: This is a well developed, well-nourished female in no acute distress, in a pleasant mood.    SKIN: Focused examination of the see below  was performed.  - L paraspinal mid back, brown patch with diffuse peppering of pigment   - R forearm , prior erythematous patch resolved   -  L lateral forearm, nonspecific slightly pink papule  - L dorsal forearm, hypopigmented macle w adjacent bright pink macule, dermoscopy some peppering of pigment   - L planter foot, red patch, ~  1cm x 1cm  - No other lesions of concern on areas examined.       Medications:  Current Outpatient Medications   Medication Sig Dispense Refill    calcium carbonate-vitamin D (CALTRATE) 600-10 MG-MCG per tablet       lisinopril (ZESTRIL) 5 MG tablet Take 1 tablet (5 mg) by mouth daily. 90 tablet 1    psyllium (METAMUCIL/KONSYL) capsule Take 1 capsule by mouth daily.      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      Past Medical History:   Patient Active Problem List   Diagnosis    Mucous polyp of cervix    CARDIOVASCULAR SCREENING; LDL GOAL LESS THAN 160    Adenomatous colon polyp    Dysplastic nevus    Thyroid nodule    Pulmonary nodule    Chronic pansinusitis    Elevated BP without diagnosis of hypertension    History of uterine cancer    Twitching     Past Medical History:   Diagnosis Date    Acute bronchospasm     one year but resolved - exercise induced.    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

## 2024-10-08 ENCOUNTER — OFFICE VISIT (OUTPATIENT)
Dept: DERMATOLOGY | Facility: CLINIC | Age: 69
End: 2024-10-08
Payer: COMMERCIAL

## 2024-10-08 DIAGNOSIS — Z85.828 HISTORY OF NONMELANOMA SKIN CANCER: Primary | ICD-10-CM

## 2024-10-08 DIAGNOSIS — L82.1 SEBORRHEIC KERATOSIS: ICD-10-CM

## 2024-10-08 DIAGNOSIS — D49.2 NEOPLASM OF SKIN: ICD-10-CM

## 2024-10-08 PROCEDURE — 99213 OFFICE O/P EST LOW 20 MIN: CPT | Mod: 25 | Performed by: DERMATOLOGY

## 2024-10-08 PROCEDURE — 11102 TANGNTL BX SKIN SINGLE LES: CPT | Performed by: DERMATOLOGY

## 2024-10-08 PROCEDURE — 88305 TISSUE EXAM BY PATHOLOGIST: CPT | Performed by: DERMATOLOGY

## 2024-10-08 ASSESSMENT — PAIN SCALES - GENERAL: PAINLEVEL: NO PAIN (0)

## 2024-10-08 NOTE — PATIENT INSTRUCTIONS
Wound Care After a Biopsy    What is a skin biopsy?  A skin biopsy allows the doctor to examine a very small piece of tissue under the microscope to determine the diagnosis and the best treatment for the skin condition. A local anesthetic (numbing medicine) is injected with a very small needle into the skin area to be tested. A small piece of skin is taken from the area. Sometimes a suture (stitch) is used.     What are the risks of a skin biopsy?  I will experience scar, bleeding, swelling, pain, crusting and redness. I may experience incomplete removal or recurrence. Risks of this procedure are excessive bleeding, bruising, infection, nerve damage, numbness, thick (hypertrophic or keloidal) scar and non-diagnostic biopsy.    How should I care for my wound for the first 24 hours?  Keep the wound dry and covered for 24 hours  If it bleeds, hold direct pressure on the area for 15 minutes. If bleeding does not stop, call us or go to the emergency room  Avoid strenuous exercise the first 1-2 days or as your doctor instructs you    How should I care for the wound after 24 hours?  After 24 hours, remove the bandage  You may bathe or shower as normal  If you had a scalp biopsy, you can shampoo as usual and can use shower water to clean the biopsy site daily  Clean the wound once a day with gentle soap and water  Do not scrub, be gentle  Apply white petroleum/Vaseline after cleaning the wound with a cotton swab or a clean finger, and keep the site covered with a Bandaid /bandage. Bandages are not necessary with a scalp biopsy  If you are unable to cover the site with a Bandaid /bandage, re-apply ointment 2-3 times a day to keep the site moist. Moisture will help with healing  Avoid strenuous activity for first 1-2 days  Avoid lakes, rivers, pools, and oceans until the stitches are removed or the site is healed    How do I clean my wound?  Wash hands thoroughly with soap or use hand  before all wound care  Clean  the wound with gentle soap and water  Apply white petroleum/Vaseline  to wound after it is clean  Replace the Bandaid /bandage to keep the wound covered for the first few days or as instructed by your doctor  If you had a scalp biopsy, warm shower water to the area on a daily basis should suffice    What should I use to clean my wound?   Cotton-tipped applicators (Qtips )  White petroleum jelly (Vaseline ). Use a clean new container and use Q-tips to apply.  Bandaids  as needed  Gentle soap     How should I care for my wound long term?  Do not get your wound dirty  Keep up with wound care for one week or until the area is healed.  A small scab will form and fall off by itself when the area is completely healed. The area will be red and will become pink in color as it heals. Sun protection is very important for how your scar will turn out. Sunscreen with an SPF 30 or greater is recommended once the area is healed.  You should have some soreness but it should be mild and slowly go away over several days. Talk to your doctor about using tylenol for pain,    When should I call my doctor?  If you have increased:   Pain or swelling  Pus or drainage (clear or slightly yellow drainage is ok)  Temperature over 100F  Spreading redness or warmth around wound    When will I hear about my results?  The biopsy results can take 2 weeks to come back.  Your results will automatically release to [employer] before your provider has even reviewed them.  The clinic will call you with the results, send you a [employer] message, or have you schedule a follow-up clinic or phone time to discuss the results.  Contact our clinics if you do not hear from us in 2 weeks.    Who should I call with questions?  [facility]: [phone]  [facility]: [phone]  For urgent needs outside of business hours call the [facility] at [phone] and ask for the dermatology resident on call

## 2024-10-08 NOTE — LETTER
10/8/2024      Leydi Zaragoza  [address]  [place] [address]      Dear Colleague,    Thank you for referring your patient, Leydi Zaragoza, to the [facility]. Please see a copy of my visit note below.    [facility] Dermatology Note  Encounter Date: Oct 8, 2024  Office Visit     Dermatology Problem List:    # NUB, L lateral forearm, nonspecific slightly pink papule  - shave bx done 10/08/2024    # BCC, L chest excised 3/11/21  - History of DN, >10 years ago, right arm without pigment recurrence  # Family history of melanoma, daughter (40s), did not require SLNB  # Benign bx  - Benign lichenoid keratosis, L upper back, s/p bx 4/13/22    #LTM  -  L dorsal forearm, Favor BLK  - L plantar mid foot   - Photo 7/2/24  ____________________________________________     Assessment & Plan:     # NUB, L lateral forearm, nonspecific slightly pink papule  - shave bx done 10/08/2024    # Resolving SK on L back  - discussed cryo but will monitor for now    # LTM - L dorsal forearm & L plantar mid foot  - new today --> L dorsal forearm there is pink patch with scalloped edges favor BLK.   - from last visit --> L plantar mid food there is a 1.1 x 9 mm pink patch; dermoscopy: No pigment network, regular vessels; favor vascular; unchanged from prior  - from last visit --> R forearm , prior erythematous patch resolved      # History of NMSC. No evidence of recurrent disease.  - Plan for FBSC July 2025, pending pathology results  - discussed risk of NMSC on ACE inhibitor, possibly few reports in literature, most evidence is for hydrochlorothiazide     Procedures Performed:   Shave biopsy: Location(s) L lateral forearm.  After discussion of benefits and risks including but not limited to bleeding/bruising, pain/swelling, infection, scar, incomplete removal, nerve damage/numbness, recurrence, and non-diagnostic biopsy,  verbal consent and photographs were obtained. Time-out was  performed. The area was cleaned with isopropyl alcohol. 0.5mL of 1% lidocaine with epinephrine was injected to obtain adequate anesthesia of each lesion. Shave biopsy was performed. Hemostasis was achieved with aluminium chloride. Vaseline and a sterile dressing were applied. The patient tolerated the procedure and no complications were noted. The patient was provided with verbal and written post care instructions.        Follow-up: July 2025 for FBSC, or sooner pending pathology     Staff and Scribe:     Scribe Disclosure:   I, Jailene Gil, am serving as a scribe; to document services personally performed by Blossom Crowley MD -based on data collection and the provider's statements to me.     Provider Disclosure:   The documentation recorded by the scribe accurately reflects the services I personally performed and the decisions made by me.    Blossom Crowley MD    Department of Dermatology  [facility]: Phone: [phone], Fax: [phone]  10/14/2024     ____________________________________________    CC: Skin Check (Spot on back on left side/3 spots that provider wanted to watch)    HPI:  Ms. Leydi Zaragoza is a(n) 69 year old female who presents today as a return patient for annual FBSC.     The patient reports that she doesn't know if the spot on her back is new, but she would like it to be checked. She states that the spot is painful. She notes that a spot that was froze off has grown back on her arm. She denies any sensitivity to this area.      Patient is otherwise feeling well, without additional skin concerns.     Labs Reviewed:  N/A    Physical exam:  Vitals: LMP 09/15/2007   GEN: This is a well developed, well-nourished female in no acute distress, in a pleasant mood.    SKIN: Focused examination of the see below  was performed.  - L paraspinal mid back, brown patch with diffuse peppering of pigment    - R forearm , prior erythematous patch resolved   -  L lateral forearm, nonspecific slightly pink papule  - L dorsal forearm, hypopigmented macle w adjacent bright pink macule, dermoscopy some peppering of pigment   - L planter foot, red patch, ~ 1cm x 1cm  - No other lesions of concern on areas examined.       Medications:  Current Outpatient Medications   Medication Sig Dispense Refill     calcium carbonate-vitamin D (CALTRATE) 600-10 MG-MCG per tablet        lisinopril (ZESTRIL) 5 MG tablet Take 1 tablet (5 mg) by mouth daily. 90 tablet 1     psyllium (METAMUCIL/KONSYL) capsule Take 1 capsule by mouth daily.       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      Past Medical History:   Patient Active Problem List   Diagnosis     Mucous polyp of cervix     CARDIOVASCULAR SCREENING; LDL GOAL LESS THAN 160     Adenomatous colon polyp     Dysplastic nevus     Thyroid nodule     Pulmonary nodule     Chronic pansinusitis     Elevated BP without diagnosis of hypertension     History of uterine cancer     Twitching     Past Medical History:   Diagnosis Date     Acute bronchospasm     one year but resolved - exercise induced.     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       Again, thank you for allowing me to participate in the care of your patient.        Sincerely,        Blossom Crowley MD

## 2024-10-25 ENCOUNTER — PATIENT OUTREACH (OUTPATIENT)
Dept: CARE COORDINATION | Facility: CLINIC | Age: 69
End: 2024-10-25
Payer: COMMERCIAL

## 2024-11-20 ENCOUNTER — OFFICE VISIT (OUTPATIENT)
Dept: ENDOCRINOLOGY | Facility: CLINIC | Age: 69
End: 2024-11-20
Payer: COMMERCIAL

## 2024-11-20 VITALS
HEART RATE: 61 BPM | OXYGEN SATURATION: 100 % | WEIGHT: 136 LBS | DIASTOLIC BLOOD PRESSURE: 74 MMHG | SYSTOLIC BLOOD PRESSURE: 147 MMHG | BODY MASS INDEX: 22.39 KG/M2

## 2024-11-20 DIAGNOSIS — E04.1 THYROID NODULE: Primary | ICD-10-CM

## 2024-11-20 DIAGNOSIS — E26.9 HIGH ALDOSTERONE TO RENIN RATIO (H): ICD-10-CM

## 2024-11-20 DIAGNOSIS — I10 BENIGN ESSENTIAL HYPERTENSION: ICD-10-CM

## 2024-11-20 DIAGNOSIS — I10 ESSENTIAL HYPERTENSION: ICD-10-CM

## 2024-11-20 RX ORDER — LISINOPRIL 5 MG/1
5 TABLET ORAL DAILY
Status: SHIPPED
Start: 2024-11-20

## 2024-11-20 RX ORDER — LISINOPRIL 5 MG/1
5 TABLET ORAL DAILY
Status: SHIPPED
Start: 2024-11-20 | End: 2024-11-20

## 2024-11-20 ASSESSMENT — PAIN SCALES - GENERAL: PAINLEVEL_OUTOF10: NO PAIN (0)

## 2024-11-20 NOTE — NURSING NOTE
"Chief Complaint   Patient presents with    Endocrine Problem     Vital signs:      BP: (!) 154/80 Pulse: 61     SpO2: 100 %       Weight: 61.7 kg (136 lb)  Estimated body mass index is 22.39 kg/m  as calculated from the following:    Height as of 8/21/24: 1.66 m (5' 5.35\").    Weight as of this encounter: 61.7 kg (136 lb).        "

## 2024-11-20 NOTE — PATIENT INSTRUCTIONS
Orders Placed This Encounter   Procedures    US Biopsy Thyroid Fine Needle Aspiration    TSH with free T4 reflex    Hemoglobin A1c    Basic metabolic panel    Fine Needle Aspirate     Potassium check this Friday when you see primary.     DEXA scan 2024

## 2024-11-20 NOTE — LETTER
11/20/2024       RE: Leydi Zaragoza  [address]  [facility] [address]     Dear Colleague,    Thank you for referring your patient, Leydi Zaragoza, to the [facility] ENDOCRINOLOGY [facility]IC [place] at [facility]. Please see a copy of my visit note below.    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  Home blood pressure mostly controlled on the current dose of lisinopril 5 mg daily.  Aldosterone of 17.1 and 15.4 with a renin activity of 0.4 and 0.2, respectively.  Further confirmation test with aldosterone suppression test was performed however result did not confirm primary aldosteronism.  In fact 24-hour urine aldosterone level was very low at 1.5 for urine sodium of 245.  Overall, confirmatory test did NOT confirm the diagnosis of primary aldosteronism.  I recommend management of essential hypertension.  Based on blood pressure consider increasing lisinopril to 10 mg daily if potassium is not high or if potassium is high consider amlodipine 5 mg daily instead of lisinopril. Leydi would like to follow-up with her primary care physician regarding blood pressure medication adjustment.  Aldosterone suppression test with oral salt loading performed and results didn't confirm primary aldosteronism.   Thyroid nodule: I have personally reviewed thyroid ultrasound from 10/1/2024 and per my initial review right-sided thyroid in the inferior nodule measuring 1.4 x 1.4 x 1.1 cm this seems unchanged compared to previous study in 2018 however upon further review noted that this nodule has calcifications, irregular borders and has to change it in high risk features compared to the 2018 study.  I have recommended  biopsy of this nodule labeled as #2 measuring 1.4 x 1.4 x 1.1 cm all other nodules subcentimeter in size. I have called Leydi Zaragoza on November 20, 2024 and recommended thyroid biopsy and order placed for thyroid biopsy.  Further plan based on results.  I recommend follow-up in 1 year for thyroid nodule standpoint.      test and/or medications prescribed today:  Orders Placed This Encounter   Procedures     US Biopsy Thyroid Fine Needle Aspiration     TSH with free T4 reflex     Hemoglobin A1c     Basic metabolic panel     Fine Needle Aspirate         Mandy Bañuelos MD  Staff Endocrinologist    Division of Endocrinology and Diabetes      Subjective:         HPI: Hypertension diagnosed over the last few years and currently managed with lisinopril 5 mg daily.  Home blood pressure mostly controlled on the current dose of lisinopril 5 mg daily.  Here to follow up on results below.      Latest Ref Rng 4/16/2024  11:29 AM 5/7/2024  7:49 AM   ENDO ADRENAL LABS      Aldosterone 0.0 - 31.0 ng/dL 13.1  15.4    Renin Activity ng/mL/hr 0.4  0.2    Home blood pressure readings reviewed and blood pressure mostly <130s on lisinopril 5 mg daily.  No history of cardiovascular disease.     Component      Latest Ref Rng 9/11/2024  7:30 AM   Sodium Urine mmol/L      mmol/L 144    Sodium Urine mmol/24 h      40 - 220 mmol/spec 245 (H)    Duration in hours      h 24.0    Duration in hours      h 24.0    Volume in mL      mL 1,700    Volume in mL      mL 1,700    Creatinine Urine      mg/dL 51.1    Creatinine Urine Timed      0.72 - 1.51 g/spec 0.87    Aldosterone Urine      1.2 - 28.1 ug/d 1.5    Creatinine, Urine per volume      mg/dL 54    Creatinine, Urine per 24hr      500 - 1400 mg/d 918    Leydi is not here for thyroid nodule assessment however noted that she has had thyroid ultrasound and biopsy in 2018 and no follow-up since.      Allergies   Allergen Reactions     Amoxicillin      rash       Current Outpatient  Medications   Medication Sig Dispense Refill     calcium carbonate-vitamin D (CALTRATE) 600-10 MG-MCG per tablet        lisinopril (ZESTRIL) 5 MG tablet Take 1 tablet (5 mg) by mouth daily.       psyllium (METAMUCIL/KONSYL) capsule Take 1 capsule by mouth daily.       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  Pertinent to the visit past medical history, past surgical history, family history, social history and social determinants of health reviewed.  Family history of hypertension in her father and mother.      Objective:   BP (!) 147/74   Pulse 61   Wt 61.7 kg (136 lb)   LMP 09/15/2007   SpO2 100%   BMI 22.39 kg/m    Constitutional: Pleasant no acute cardiopulmonary distress.   EYES: anicteric, normal extra-ocular movements, no lid lag or retraction, is equal and reactive to light bilaterally.  HEENT: Mouth/Throat: Mucous membrane is moist. Oropharynx is clear.    Cardiovascular: RRR, S1, S2 normal.   Pulmonary/Chest: CTAB. No wheezing or rales.   Abdominal: +BS. Non tender to palpation.  Stretch marks: None.  Neurological: Alert and oriented.  No tremor and reflexes are symmetrical bilaterally and within the normal limits. Muscle strength 5/5.   Extremities: No edema.  Psychological: appropriate mood and affect    In House Labs:     TSH   Date Value Ref Range Status   11/21/2023 2.86 0.30 - 4.20 uIU/mL Final   11/18/2022 3.68 0.30 - 4.20 uIU/mL Final   11/16/2021 2.77 0.40 - 4.00 mU/L Final   10/28/2020 2.86 0.40 - 4.00 mU/L Final   10/22/2019 2.48 0.40 - 4.00 mU/L Final   05/16/2018 1.20 0.40 - 4.00 mU/L Final   09/03/2014 1.64 0.40 - 4.00 mU/L Final      Comment:     Effective 7/30/2014, the reference range for this assay has changed to reflect   new instrumentation/methodology.     07/24/2013 2.07 0.4 - 5.0 mU/L Final     T4 Free   Date Value Ref Range Status   05/16/2018 0.94 0.76 - 1.46 ng/dL Final       Creatinine   Date Value Ref Range Status   04/09/2024 0.78 0.51 - 0.95 mg/dL Final   11/05/2020 0.64 0.52 - 1.04 mg/dL Final     This note has been dictated using voice recognition software.  As a result, there may be errors in the documentation that have gone undetected.  Please consider this when interpreting information in this documentation.      51 minutes spent by me on the date of the encounter doing chart review, history and exam, documentation and further activities per the note. The longitudinal plan of care for the diagnosis(es)/condition(s) as documented were addressed during this visit. Due to the added complexity in care, I will continue to support Leydi in the subsequent management and with ongoing continuity of care.       Again, thank you for allowing me to participate in the care of your patient.      Sincerely,    Mandy Bañuelos MD

## 2024-11-21 NOTE — PROGRESS NOTES
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  Home blood pressure mostly controlled on the current dose of lisinopril 5 mg daily.  Aldosterone of 17.1 and 15.4 with a renin activity of 0.4 and 0.2, respectively.  Further confirmation test with aldosterone suppression test was performed however result did not confirm primary aldosteronism.  In fact 24-hour urine aldosterone level was very low at 1.5 for urine sodium of 245.  Overall, confirmatory test did NOT confirm the diagnosis of primary aldosteronism.  I recommend management of essential hypertension.  Based on blood pressure consider increasing lisinopril to 10 mg daily if potassium is not high or if potassium is high consider amlodipine 5 mg daily instead of lisinopril. Leydi would like to follow-up with her primary care physician regarding blood pressure medication adjustment.  Aldosterone suppression test with oral salt loading performed and results didn't confirm primary aldosteronism.   Thyroid nodule: I have personally reviewed thyroid ultrasound from 10/1/2024 and per my initial review right-sided thyroid in the inferior nodule measuring 1.4 x 1.4 x 1.1 cm this seems unchanged compared to previous study in 2018 however upon further review noted that this nodule has calcifications, irregular borders and has to change it in high risk features compared to the 2018 study.  I have recommended biopsy of this nodule labeled as #2 measuring 1.4 x 1.4 x 1.1 cm all other nodules subcentimeter in size. I have called Leydi Zaragoza on November 20, 2024 and recommended thyroid biopsy and order placed for thyroid biopsy.  Further plan based on results.  I recommend follow-up in 1 year for thyroid nodule  standpoint.      test and/or medications prescribed today:  Orders Placed This Encounter   Procedures    US Biopsy Thyroid Fine Needle Aspiration    TSH with free T4 reflex    Hemoglobin A1c    Basic metabolic panel    Fine Needle Aspirate         Mandy Bañuelos MD  Staff Endocrinologist    Division of Endocrinology and Diabetes      Subjective:         HPI: Hypertension diagnosed over the last few years and currently managed with lisinopril 5 mg daily.  Home blood pressure mostly controlled on the current dose of lisinopril 5 mg daily.  Here to follow up on results below.      Latest Ref Rng 4/16/2024  11:29 AM 5/7/2024  7:49 AM   ENDO ADRENAL LABS      Aldosterone 0.0 - 31.0 ng/dL 13.1  15.4    Renin Activity ng/mL/hr 0.4  0.2    Home blood pressure readings reviewed and blood pressure mostly <130s on lisinopril 5 mg daily.  No history of cardiovascular disease.     Component      Latest Ref Rng 9/11/2024  7:30 AM   Sodium Urine mmol/L      mmol/L 144    Sodium Urine mmol/24 h      40 - 220 mmol/spec 245 (H)    Duration in hours      h 24.0    Duration in hours      h 24.0    Volume in mL      mL 1,700    Volume in mL      mL 1,700    Creatinine Urine      mg/dL 51.1    Creatinine Urine Timed      0.72 - 1.51 g/spec 0.87    Aldosterone Urine      1.2 - 28.1 ug/d 1.5    Creatinine, Urine per volume      mg/dL 54    Creatinine, Urine per 24hr      500 - 1400 mg/d 918    Leydi is not here for thyroid nodule assessment however noted that she has had thyroid ultrasound and biopsy in 2018 and no follow-up since.      Allergies   Allergen Reactions    Amoxicillin      rash       Current Outpatient Medications   Medication Sig Dispense Refill    calcium carbonate-vitamin D (CALTRATE) 600-10 MG-MCG per tablet       lisinopril (ZESTRIL) 5 MG tablet Take 1 tablet (5 mg) by mouth daily.      psyllium (METAMUCIL/KONSYL) capsule Take 1 capsule by mouth daily.      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  Pertinent to the visit past medical history, past surgical history, family history, social history and social determinants of health reviewed.  Family history of hypertension in her father and mother.      Objective:   BP (!) 147/74   Pulse 61   Wt 61.7 kg (136 lb)   LMP 09/15/2007   SpO2 100%   BMI 22.39 kg/m    Constitutional: Pleasant no acute cardiopulmonary distress.   EYES: anicteric, normal extra-ocular movements, no lid lag or retraction, is equal and reactive to light bilaterally.  HEENT: Mouth/Throat: Mucous membrane is moist. Oropharynx is clear.    Cardiovascular: RRR, S1, S2 normal.   Pulmonary/Chest: CTAB. No wheezing or rales.   Abdominal: +BS. Non tender to palpation.  Stretch marks: None.  Neurological: Alert and oriented.  No tremor and reflexes are symmetrical bilaterally and within the normal limits. Muscle strength 5/5.   Extremities: No edema.  Psychological: appropriate mood and affect    In House Labs:     TSH   Date Value Ref Range Status   11/21/2023 2.86 0.30 - 4.20 uIU/mL Final   11/18/2022 3.68 0.30 - 4.20 uIU/mL Final   11/16/2021 2.77 0.40 - 4.00 mU/L Final   10/28/2020 2.86 0.40 - 4.00 mU/L Final   10/22/2019 2.48 0.40 - 4.00 mU/L Final   05/16/2018 1.20 0.40 - 4.00 mU/L Final   09/03/2014 1.64 0.40 - 4.00 mU/L Final     Comment:     Effective 7/30/2014, the reference range for this assay has changed to reflect   new instrumentation/methodology.     07/24/2013 2.07 0.4 - 5.0 mU/L Final     T4 Free   Date Value Ref Range Status   05/16/2018 0.94 0.76 - 1.46 ng/dL Final       Creatinine   Date Value Ref Range Status   04/09/2024 0.78 0.51 -  0.95 mg/dL Final   11/05/2020 0.64 0.52 - 1.04 mg/dL Final     This note has been dictated using voice recognition software.  As a result, there may be errors in the documentation that have gone undetected.  Please consider this when interpreting information in this documentation.      51 minutes spent by me on the date of the encounter doing chart review, history and exam, documentation and further activities per the note. The longitudinal plan of care for the diagnosis(es)/condition(s) as documented were addressed during this visit. Due to the added complexity in care, I will continue to support Leydi in the subsequent management and with ongoing continuity of care.

## 2024-11-22 PROBLEM — R03.0 ELEVATED BP WITHOUT DIAGNOSIS OF HYPERTENSION: Status: RESOLVED | Noted: 2024-02-29 | Resolved: 2024-11-22

## 2024-11-22 PROBLEM — I10 BENIGN ESSENTIAL HYPERTENSION: Status: ACTIVE | Noted: 2024-11-22

## 2024-11-25 ENCOUNTER — TELEPHONE (OUTPATIENT)
Dept: ENDOCRINOLOGY | Facility: CLINIC | Age: 69
End: 2024-11-25
Payer: COMMERCIAL

## 2024-11-25 NOTE — TELEPHONE ENCOUNTER
Patient Contacted for the patient to call back and schedule the following:    Appointment type: U/S guided Thyroid biopsy  Provider: Mandy Bañuelos MD  Return date: next available  Specialty phone number: [phone]  Additional appointment(s) needed: N/A  Additonal Notes: Transferred [place] to [facility], who attempted to schedule pt for biopsy. Imaging scheduler was unable to load any appointments at [facility] and informed pt that she would be escalating issue to management. Imaging scheduler informed pt that someone would call her at a later date to reschedule. Will keep pt in inbasket and defer.    Linda Lau on 11/25/2024 at 11:02 AM

## 2024-11-27 NOTE — TELEPHONE ENCOUNTER
Called [facility] imaging to verify that scheduling was available before contacting patient. Staff member Ginger was unaware of any issues experienced on Monday; offered to look at pt's chart. Ginger was confused by the recommendation of general anesthesia for the biopsy and stated she had never seen that, and that it's possible that GA procedures are only offered at certain clinics. Ginger offered to put author on hold while she asked another technician for assistance. Author waited 30 minutes without a response before ending the call. Will defer calling imaging by another day.    Linda Lau on 11/27/2024 at 10:17 AM

## 2024-12-17 ENCOUNTER — ANCILLARY PROCEDURE (OUTPATIENT)
Dept: MAMMOGRAPHY | Facility: CLINIC | Age: 69
End: 2024-12-17
Payer: COMMERCIAL

## 2024-12-17 DIAGNOSIS — Z12.31 VISIT FOR SCREENING MAMMOGRAM: ICD-10-CM

## 2024-12-17 PROCEDURE — 77063 BREAST TOMOSYNTHESIS BI: CPT | Mod: TC | Performed by: RADIOLOGY

## 2024-12-17 PROCEDURE — 77067 SCR MAMMO BI INCL CAD: CPT | Mod: TC | Performed by: RADIOLOGY

## 2024-12-23 DIAGNOSIS — I10 BENIGN ESSENTIAL HYPERTENSION: ICD-10-CM

## 2024-12-23 NOTE — TELEPHONE ENCOUNTER
Medication Question or Refill    Contacts       Contact Date/Time Type Contact Phone/Fax    12/23/2024 07:12 AM CST Phone (Incoming) Leydi Zaragoza (Self) [phone] (M)            What medication are you calling about (include dose and sig)?: LISINOPRIL    Preferred Pharmacy:   [id number] - [place], [place] - [address]  [address] [place] [address]  Phone: [phone] Fax: [phone]      Controlled Substance Agreement on file:   CSA -- Patient Level:    CSA: None found at the patient level.       Who prescribed the medication?: HANK     Do you need a refill? Yes    When did you use the medication last? 12/22/2024    Patient offered an appointment? No    Do you have any questions or concerns?  No      Could we send this information to you in [facility] or would you prefer to receive a phone call?:   Patient would prefer a phone call   Okay to leave a detailed message?: Yes at Home number on file [phone] (home)

## 2024-12-24 RX ORDER — LISINOPRIL 5 MG/1
5 TABLET ORAL DAILY
Qty: 90 TABLET | Refills: 1 | Status: SHIPPED | OUTPATIENT
Start: 2024-12-24

## 2025-01-07 ENCOUNTER — ANCILLARY PROCEDURE (OUTPATIENT)
Dept: ULTRASOUND IMAGING | Facility: CLINIC | Age: 70
End: 2025-01-07
Attending: INTERNAL MEDICINE
Payer: COMMERCIAL

## 2025-01-07 DIAGNOSIS — E04.1 THYROID NODULE: ICD-10-CM

## 2025-01-07 PROCEDURE — 88172 CYTP DX EVAL FNA 1ST EA SITE: CPT | Mod: 26 | Performed by: PATHOLOGY

## 2025-01-07 PROCEDURE — 88173 CYTOPATH EVAL FNA REPORT: CPT | Mod: 26 | Performed by: PATHOLOGY

## 2025-01-07 PROCEDURE — 88172 CYTP DX EVAL FNA 1ST EA SITE: CPT | Mod: TC | Performed by: INTERNAL MEDICINE

## 2025-01-07 PROCEDURE — 10005 FNA BX W/US GDN 1ST LES: CPT | Mod: RT | Performed by: STUDENT IN AN ORGANIZED HEALTH CARE EDUCATION/TRAINING PROGRAM

## 2025-01-07 RX ORDER — LIDOCAINE HYDROCHLORIDE 10 MG/ML
5 INJECTION, SOLUTION INFILTRATION; PERINEURAL ONCE
Status: COMPLETED | OUTPATIENT
Start: 2025-01-07 | End: 2025-01-07

## 2025-01-07 RX ADMIN — LIDOCAINE HYDROCHLORIDE 1 ML: 10 INJECTION, SOLUTION INFILTRATION; PERINEURAL at 10:20

## 2025-03-20 ENCOUNTER — OFFICE VISIT (OUTPATIENT)
Dept: DERMATOLOGY | Facility: CLINIC | Age: 70
End: 2025-03-20
Payer: COMMERCIAL

## 2025-03-20 DIAGNOSIS — L82.0 LICHENOID KERATOSIS: Primary | ICD-10-CM

## 2025-03-20 DIAGNOSIS — L82.1 SEBORRHEIC KERATOSIS: ICD-10-CM

## 2025-03-20 ASSESSMENT — PAIN SCALES - GENERAL: PAINLEVEL_OUTOF10: NO PAIN (0)

## 2025-03-20 NOTE — PROGRESS NOTES
[facility] Dermatology Note  Encounter Date: Mar 20, 2025  Office Visit     Reviewed patients past medical history and pertinent chart review prior to patients visit today.     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  ____________________________________________    Assessment & Plan:     # Seborrheic keratoses, bilateral lower eyelids and right vulvar area  # Lichenoid keratoses, bilateral posterior forearms  - Benign, no further treatment needed. These lesions appear reassuring exam. Continue with observation at this time. If lesions become bothersome, patient return to clinic for reevaluation.    Follow-up: July 2025 for FBSC, prn for new or changing lesions or new concerns    All risks, benefits and alternatives were discussed with patient.  Patient is in agreement and understands the assessment and plan.  All questions were answered.  Kimberly Fishman PA-C  [facility]  _______________________________________    CC: Skin Check (- lump under both eyes, left side is larger than the right, present for months, flesh colored, asymptomatic/- red spots on both forearms)     HPI:  Ms. Leydi Zaragoza is a(n) 69 year old female who presents today as a return patient for evaluation of a skin lesion involving the bilateral lower eyelids.  These have been present for a few months.  The lesion at the left lower eyelid has grown larger in size.  They are asymptomatic.  Additionally, she has noticed red spots on the left posterior forearm.  She treated with  hydrocortisone for 4 or 5 times which she states offered significant improvement.  The areas were pruritic but this has not resolved.  Lastly, she has a lesion at the right vulvar area that occasionally becomes irritated.  It is otherwise asymptomatic.    Patient is otherwise feeling well, without additional skin concerns. Patient is diligent with photoprotection.    Physical Exam:  SKIN: Focused examination of face and posterior forearms only was performed per patient request.  - bilateral lower eyelids, waxy, stuck on skin colored to tan papules  - right vulvar area, waxy, stuck on tan/brown plaque  - bilateral posterior forearms, few scaly pink papules with reassuring features    - No other lesions of concern on areas examined.     Medications:  Current Outpatient Medications   Medication Sig Dispense Refill    calcium carbonate-vitamin D (CALTRATE) 600-10 MG-MCG per tablet       lisinopril (ZESTRIL) 10 MG tablet Take 0.5 tablets (5 mg) by mouth daily. 90 tablet 0    psyllium (METAMUCIL/KONSYL) capsule Take 1 capsule by mouth daily.      vitamin B complex with vitamin C (VITAMIN  B COMPLEX) tablet        No current facility-administered medications for this visit.      Past Medical History:   Patient Active Problem List   Diagnosis    Mucous polyp of cervix    CARDIOVASCULAR SCREENING; LDL GOAL LESS THAN 160    Adenomatous colon polyp    Dysplastic nevus    Thyroid nodule    Pulmonary nodule    Chronic pansinusitis    History of uterine cancer    Twitching    Benign essential hypertension     Past Medical History:   Diagnosis Date    Acute bronchospasm     one year but resolved - exercise induced.    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

## 2025-03-20 NOTE — LETTER
3/20/2025      Leydi Zaragoza  [address]  [facility] [address]      Dear Colleague,    Thank you for referring your patient, Leydi Zaragoza, to the [facility]. Please see a copy of my visit note below.    [facility] Dermatology Note  Encounter Date: Mar 20, 2025  Office Visit     Reviewed patients past medical history and pertinent chart review prior to patients visit today.     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  ____________________________________________    Assessment & Plan:     # Seborrheic keratoses, bilateral lower eyelids and right vulvar area  # Lichenoid keratoses, bilateral posterior forearms  - Benign, no further treatment needed. These lesions appear reassuring exam. Continue with observation at this time. If lesions become bothersome, patient return to clinic for reevaluation.    Follow-up: July 2025 for FBSC, prn for new or changing lesions or new concerns    All risks, benefits and alternatives were discussed with patient.  Patient is in agreement and understands the assessment and plan.  All questions were answered.  Kimberly Fishman PA-C  [facility]  _______________________________________    CC: Skin Check (- lump under both eyes, left side is larger than the right, present for months, flesh colored, asymptomatic/- red spots on both forearms)     HPI:  Ms. Leydi Zaragoza is a(n) 69 year old female who presents today as a return patient for evaluation of a skin lesion  involving the bilateral lower eyelids.  These have been present for a few months.  The lesion at the left lower eyelid has grown larger in size.  They are asymptomatic.  Additionally, she has noticed red spots on the left posterior forearm.  She treated with hydrocortisone for 4 or 5 times which she states offered significant improvement.  The areas were pruritic but this has not resolved.  Lastly, she has a lesion at the right vulvar area that occasionally becomes irritated.  It is otherwise asymptomatic.    Patient is otherwise feeling well, without additional skin concerns. Patient is diligent with photoprotection.    Physical Exam:  SKIN: Focused examination of face and posterior forearms only was performed per patient request.  - bilateral lower eyelids, waxy, stuck on skin colored to tan papules  - right vulvar area, waxy, stuck on tan/brown plaque  - bilateral posterior forearms, few scaly pink papules with reassuring features    - No other lesions of concern on areas examined.     Medications:  Current Outpatient Medications   Medication Sig Dispense Refill     calcium carbonate-vitamin D (CALTRATE) 600-10 MG-MCG per tablet        lisinopril (ZESTRIL) 10 MG tablet Take 0.5 tablets (5 mg) by mouth daily. 90 tablet 0     psyllium (METAMUCIL/KONSYL) capsule Take 1 capsule by mouth daily.       vitamin B complex with vitamin C (VITAMIN  B COMPLEX) tablet        No current facility-administered medications for this visit.      Past Medical History:   Patient Active Problem List   Diagnosis     Mucous polyp of cervix     CARDIOVASCULAR SCREENING; LDL GOAL LESS THAN 160     Adenomatous colon polyp     Dysplastic nevus     Thyroid nodule     Pulmonary nodule     Chronic pansinusitis     History of uterine cancer     Twitching     Benign essential hypertension     Past Medical History:   Diagnosis Date     Acute bronchospasm     one year but resolved - exercise induced.     Arthritis 2023    Arthritis in right knee  cap     Basal cell carcinoma      Benign essential hypertension 11/22/2024     Cancer (H) 2013    endometrioid andenocarcinoma     Thyroid nodule 05/16/2018     Uterine polyp        CC Referred Self, MD  No address on file on close of this encounter.       Again, thank you for allowing me to participate in the care of your patient.        Sincerely,        Marysol Fishman PA-C    Electronically signed

## 2025-04-09 ENCOUNTER — OFFICE VISIT (OUTPATIENT)
Dept: FAMILY MEDICINE | Facility: CLINIC | Age: 70
End: 2025-04-09
Payer: COMMERCIAL

## 2025-04-09 VITALS
SYSTOLIC BLOOD PRESSURE: 128 MMHG | RESPIRATION RATE: 16 BRPM | HEART RATE: 63 BPM | BODY MASS INDEX: 21.4 KG/M2 | DIASTOLIC BLOOD PRESSURE: 75 MMHG | WEIGHT: 133 LBS | TEMPERATURE: 97.8 F | OXYGEN SATURATION: 96 %

## 2025-04-09 DIAGNOSIS — I10 BENIGN ESSENTIAL HYPERTENSION: ICD-10-CM

## 2025-04-09 DIAGNOSIS — E04.1 THYROID NODULE: Primary | ICD-10-CM

## 2025-04-09 PROCEDURE — 80048 BASIC METABOLIC PNL TOTAL CA: CPT | Performed by: FAMILY MEDICINE

## 2025-04-09 PROCEDURE — 3074F SYST BP LT 130 MM HG: CPT | Performed by: FAMILY MEDICINE

## 2025-04-09 PROCEDURE — 36415 COLL VENOUS BLD VENIPUNCTURE: CPT | Performed by: FAMILY MEDICINE

## 2025-04-09 PROCEDURE — 3078F DIAST BP <80 MM HG: CPT | Performed by: FAMILY MEDICINE

## 2025-04-09 PROCEDURE — 99214 OFFICE O/P EST MOD 30 MIN: CPT | Performed by: FAMILY MEDICINE

## 2025-04-09 RX ORDER — LISINOPRIL 10 MG/1
10 TABLET ORAL 2 TIMES DAILY
Qty: 180 TABLET | Refills: 1 | Status: SHIPPED | OUTPATIENT
Start: 2025-04-09

## 2025-04-09 NOTE — PROGRESS NOTES
Assessment & Plan     Thyroid nodule  Reviewed her nodule hx  First noted incidentally on CT in 2018 and had biopsy that was negative  Had ultrasound 1/2025 that showed 4 nodules   Additional nodule biopsied and was benign  Recommendation is to check annual ultrasound for 5 years but has had nodule first noted in 2018 but unclear at time if she had one or more ?  Will reassess interval after next year ultrasound    Benign essential hypertension  BP is slightly high at times -   Currently taking 5mg BID   Will increase to 10mg BID and she will take 10mg in AM and 5mg in PM see if that controls her BP  If not improving consider amlodipine but concerns about side effect of constipation.  Pt will call or RTC if symptoms worsen or do not improve.   - lisinopril (ZESTRIL) 10 MG tablet; Take 1 tablet (10 mg) by mouth 2 times daily.  - Basic metabolic panel  (Ca, Cl, CO2, Creat, Gluc, K, Na, BUN); Future  - Basic metabolic panel  (Ca, Cl, CO2, Creat, Gluc, K, Na, BUN)                Sonia Holley is a 69 year old, presenting for the following health issues:  Hypertension        4/9/2025    10:21 AM   Additional Questions   Roomed by Chichi LANDON MA   Accompanied by self         4/9/2025    10:21 AM   Patient Reported Additional Medications   Patient reports taking the following new medications none     HPI        Hypertension Follow-up    Do you check your blood pressure regularly outside of the clinic? Yes   Are you following a low salt diet? Yes  Are your blood pressures ever more than 140 on the top number (systolic) OR more   than 90 on the bottom number (diastolic), for example 140/90? No    BP Readings from Last 2 Encounters:   04/09/25 128/75   11/22/24 117/74     How many servings of fruits and vegetables do you eat daily?  4 or more  On average, how many sweetened beverages do you drink each day (Examples: soda, juice, sweet tea, etc.  Do NOT count diet or artificially sweetened beverages)?   None   How many days  per week do you exercise enough to make your heart beat faster? 4  How many minutes a day do you exercise enough to make your heart beat faster? 60 or more  How many days per week do you miss taking your medication? 0      Review of Systems  Constitutional, HEENT, cardiovascular, pulmonary, GI, , musculoskeletal, neuro, skin, endocrine and psych systems are negative, except as otherwise noted.      Objective    /75 (BP Location: Left arm, Patient Position: Sitting, Cuff Size: Adult Regular)   Pulse 63   Temp 97.8  F (36.6  C) (Temporal)   Resp 16   Wt 60.3 kg (133 lb)   LMP 09/15/2007   SpO2 96%   BMI 21.40 kg/m    Body mass index is 21.4 kg/m .  Physical Exam   GENERAL: alert and no distress  ABDOMEN: soft, nontender, no hepatosplenomegaly, no masses and bowel sounds normal    No results found for this or any previous visit (from the past 24 hours).        Signed Electronically by: Mary Leonardo,

## 2025-04-10 LAB
ANION GAP SERPL CALCULATED.3IONS-SCNC: 10 MMOL/L (ref 7–15)
BUN SERPL-MCNC: 22 MG/DL (ref 8–23)
CALCIUM SERPL-MCNC: 9.8 MG/DL (ref 8.8–10.4)
CHLORIDE SERPL-SCNC: 104 MMOL/L (ref 98–107)
CREAT SERPL-MCNC: 0.7 MG/DL (ref 0.51–0.95)
EGFRCR SERPLBLD CKD-EPI 2021: >90 ML/MIN/1.73M2
GLUCOSE SERPL-MCNC: 90 MG/DL (ref 70–99)
HCO3 SERPL-SCNC: 27 MMOL/L (ref 22–29)
POTASSIUM SERPL-SCNC: 5.1 MMOL/L (ref 3.4–5.3)
SODIUM SERPL-SCNC: 141 MMOL/L (ref 135–145)

## 2025-04-10 NOTE — RESULT ENCOUNTER NOTE
Dear Leydi,   Your test results are all back -   -Kidney function is normal (Cr, GFR), Sodium is normal, Potassium is normal, Calcium is normal, Glucose is normal.   Let us know if you have any questions.  -Mary Leonardo, DO

## 2025-06-27 ENCOUNTER — TRANSFERRED RECORDS (OUTPATIENT)
Dept: HEALTH INFORMATION MANAGEMENT | Facility: CLINIC | Age: 70
End: 2025-06-27
Payer: COMMERCIAL

## 2025-07-09 ENCOUNTER — OFFICE VISIT (OUTPATIENT)
Dept: FAMILY MEDICINE | Facility: CLINIC | Age: 70
End: 2025-07-09
Payer: COMMERCIAL

## 2025-07-09 VITALS
OXYGEN SATURATION: 96 % | DIASTOLIC BLOOD PRESSURE: 81 MMHG | HEART RATE: 63 BPM | SYSTOLIC BLOOD PRESSURE: 126 MMHG | TEMPERATURE: 97.9 F | WEIGHT: 137 LBS | BODY MASS INDEX: 22.05 KG/M2 | RESPIRATION RATE: 16 BRPM

## 2025-07-09 DIAGNOSIS — I10 BENIGN ESSENTIAL HYPERTENSION: Primary | ICD-10-CM

## 2025-07-09 PROCEDURE — 99213 OFFICE O/P EST LOW 20 MIN: CPT | Performed by: FAMILY MEDICINE

## 2025-07-09 PROCEDURE — 3074F SYST BP LT 130 MM HG: CPT | Performed by: FAMILY MEDICINE

## 2025-07-09 PROCEDURE — 3079F DIAST BP 80-89 MM HG: CPT | Performed by: FAMILY MEDICINE

## 2025-07-09 ASSESSMENT — PATIENT HEALTH QUESTIONNAIRE - PHQ9
SUM OF ALL RESPONSES TO PHQ QUESTIONS 1-9: 1
10. IF YOU CHECKED OFF ANY PROBLEMS, HOW DIFFICULT HAVE THESE PROBLEMS MADE IT FOR YOU TO DO YOUR WORK, TAKE CARE OF THINGS AT HOME, OR GET ALONG WITH OTHER PEOPLE: NOT DIFFICULT AT ALL
SUM OF ALL RESPONSES TO PHQ QUESTIONS 1-9: 1

## 2025-07-09 NOTE — PROGRESS NOTES
Assessment & Plan     Benign essential hypertension  BP controlled with 10mg in AM and 5mg in PM   Checking BP three times a day but will back down to a few times per week  Follow-up in 6 months for annual wellness   No labs needed today         Sonia Holley is a 70 year old, presenting for the following health issues:  No chief complaint on file.        7/9/2025     1:44 PM   Additional Questions   Roomed by Chichi BARBI LEWIS   Accompanied by          7/9/2025     1:44 PM   Patient Reported Additional Medications   Patient reports taking the following new medications none     HPI        Hypertension Follow-up    Do you check your blood pressure regularly outside of the clinic? Yes   Are you following a low salt diet? Yes  Are your blood pressures ever more than 140 on the top number (systolic) OR more   than 90 on the bottom number (diastolic), for example 140/90? No    BP Readings from Last 2 Encounters:   04/09/25 128/75   11/22/24 117/74           Review of Systems  Constitutional, HEENT, cardiovascular, pulmonary, gi and gu systems are negative, except as otherwise noted.      Objective    LMP 09/15/2007   There is no height or weight on file to calculate BMI.  Physical Exam   GENERAL: alert and no distress  CV: regular rate and rhythm, normal S1 S2, no S3 or S4, no murmur, click or rub, no peripheral edema             Signed Electronically by: Mary Leonardo, DO    Answers submitted by the patient for this visit:  Patient Health Questionnaire (Submitted on 7/9/2025)  If you checked off any problems, how difficult have these problems made it for you to do your work, take care of things at home, or get along with other people?: Not difficult at all  PHQ9 TOTAL SCORE: 1

## 2025-07-09 NOTE — PROGRESS NOTES
Urgent Care Clinic Visit  {Rapid Rooming (Optional):633504}  No chief complaint on file.          {(!) Visit Details have not yet been documented.  Please enter Visit Details and then use this list to pull in documentation. (Optional):680004}  {MA/LPN/RN Pre-Provider Visit Orders- hCG/UA/Strep/Influenza/Vaginal Infection (Optional):517934}

## (undated) RX ORDER — LIDOCAINE HYDROCHLORIDE 10 MG/ML
INJECTION, SOLUTION EPIDURAL; INFILTRATION; INTRACAUDAL; PERINEURAL
Status: DISPENSED
Start: 2025-01-07

## (undated) RX ORDER — LIDOCAINE HYDROCHLORIDE 10 MG/ML
INJECTION, SOLUTION EPIDURAL; INFILTRATION; INTRACAUDAL; PERINEURAL
Status: DISPENSED
Start: 2018-04-24